# Patient Record
Sex: FEMALE | Race: WHITE | Employment: OTHER | URBAN - METROPOLITAN AREA
[De-identification: names, ages, dates, MRNs, and addresses within clinical notes are randomized per-mention and may not be internally consistent; named-entity substitution may affect disease eponyms.]

---

## 2017-05-16 ENCOUNTER — LAB ENCOUNTER (OUTPATIENT)
Dept: LAB | Age: 72
End: 2017-05-16
Attending: OBSTETRICS & GYNECOLOGY
Payer: MEDICARE

## 2017-05-16 DIAGNOSIS — C56.9 MALIGNANT NEOPLASM OF OVARY (HCC): Primary | ICD-10-CM

## 2017-05-16 PROCEDURE — 86304 IMMUNOASSAY TUMOR CA 125: CPT

## 2017-05-16 PROCEDURE — 36415 COLL VENOUS BLD VENIPUNCTURE: CPT

## 2017-10-30 ENCOUNTER — LAB ENCOUNTER (OUTPATIENT)
Dept: LAB | Age: 72
End: 2017-10-30
Attending: OBSTETRICS & GYNECOLOGY
Payer: MEDICARE

## 2017-10-30 DIAGNOSIS — C56.9 MALIGNANT NEOPLASM OF OVARY (HCC): Primary | ICD-10-CM

## 2017-10-30 PROCEDURE — 86304 IMMUNOASSAY TUMOR CA 125: CPT

## 2017-10-30 PROCEDURE — 36415 COLL VENOUS BLD VENIPUNCTURE: CPT

## 2018-05-14 ENCOUNTER — LAB ENCOUNTER (OUTPATIENT)
Dept: LAB | Age: 73
End: 2018-05-14
Attending: OBSTETRICS & GYNECOLOGY
Payer: MEDICARE

## 2018-05-14 DIAGNOSIS — C56.9 OVARIAN CANCER (HCC): Primary | ICD-10-CM

## 2018-05-14 PROCEDURE — 86304 IMMUNOASSAY TUMOR CA 125: CPT

## 2018-05-14 PROCEDURE — 36415 COLL VENOUS BLD VENIPUNCTURE: CPT

## 2018-11-05 ENCOUNTER — LAB ENCOUNTER (OUTPATIENT)
Dept: LAB | Age: 73
End: 2018-11-05
Attending: OBSTETRICS & GYNECOLOGY
Payer: MEDICARE

## 2018-11-05 DIAGNOSIS — C56.9 MALIGNANT NEOPLASM OF OVARY (HCC): Primary | ICD-10-CM

## 2018-11-05 PROCEDURE — 36415 COLL VENOUS BLD VENIPUNCTURE: CPT

## 2018-11-05 PROCEDURE — 86304 IMMUNOASSAY TUMOR CA 125: CPT

## 2019-11-06 ENCOUNTER — LAB ENCOUNTER (OUTPATIENT)
Dept: LAB | Age: 74
End: 2019-11-06
Attending: OBSTETRICS & GYNECOLOGY
Payer: MEDICARE

## 2019-11-06 DIAGNOSIS — C56.9 MALIGNANT NEOPLASM OF OVARY (HCC): Primary | ICD-10-CM

## 2019-11-06 PROCEDURE — 36415 COLL VENOUS BLD VENIPUNCTURE: CPT

## 2019-11-06 PROCEDURE — 86304 IMMUNOASSAY TUMOR CA 125: CPT

## 2020-01-01 ENCOUNTER — OFFICE VISIT (OUTPATIENT)
Dept: HEMATOLOGY/ONCOLOGY | Facility: HOSPITAL | Age: 75
End: 2020-01-01
Attending: INTERNAL MEDICINE
Payer: MEDICARE

## 2020-01-01 ENCOUNTER — HOSPITAL ENCOUNTER (OUTPATIENT)
Dept: CT IMAGING | Facility: HOSPITAL | Age: 75
Discharge: HOME | End: 2020-01-01
Attending: INTERNAL MEDICINE
Payer: MEDICARE

## 2020-01-01 VITALS
SYSTOLIC BLOOD PRESSURE: 157 MMHG | HEIGHT: 59.45 IN | TEMPERATURE: 99 F | RESPIRATION RATE: 18 BRPM | HEART RATE: 75 BPM | DIASTOLIC BLOOD PRESSURE: 81 MMHG | OXYGEN SATURATION: 98 % | WEIGHT: 166 LBS | BODY MASS INDEX: 33.02 KG/M2

## 2020-01-01 VITALS
WEIGHT: 167.5 LBS | BODY MASS INDEX: 33.32 KG/M2 | SYSTOLIC BLOOD PRESSURE: 140 MMHG | HEIGHT: 59.45 IN | DIASTOLIC BLOOD PRESSURE: 81 MMHG | RESPIRATION RATE: 16 BRPM | HEART RATE: 72 BPM | TEMPERATURE: 99 F | OXYGEN SATURATION: 98 %

## 2020-01-01 VITALS
OXYGEN SATURATION: 96 % | HEART RATE: 76 BPM | WEIGHT: 166.38 LBS | TEMPERATURE: 97 F | RESPIRATION RATE: 16 BRPM | BODY MASS INDEX: 33.1 KG/M2 | HEIGHT: 59.45 IN | SYSTOLIC BLOOD PRESSURE: 151 MMHG | DIASTOLIC BLOOD PRESSURE: 81 MMHG

## 2020-01-01 VITALS
BODY MASS INDEX: 33.02 KG/M2 | WEIGHT: 166 LBS | OXYGEN SATURATION: 97 % | TEMPERATURE: 99 F | SYSTOLIC BLOOD PRESSURE: 133 MMHG | DIASTOLIC BLOOD PRESSURE: 72 MMHG | RESPIRATION RATE: 16 BRPM | HEIGHT: 59.45 IN | HEART RATE: 76 BPM

## 2020-01-01 VITALS
HEIGHT: 59.45 IN | BODY MASS INDEX: 32.7 KG/M2 | OXYGEN SATURATION: 97 % | WEIGHT: 164.38 LBS | TEMPERATURE: 99 F | DIASTOLIC BLOOD PRESSURE: 81 MMHG | SYSTOLIC BLOOD PRESSURE: 147 MMHG | RESPIRATION RATE: 16 BRPM | HEART RATE: 73 BPM

## 2020-01-01 DIAGNOSIS — C80.1 PANCREATOBILIARY-TYPE CARCINOMA (HCC): ICD-10-CM

## 2020-01-01 DIAGNOSIS — C78.7 LIVER METASTASES (HCC): ICD-10-CM

## 2020-01-01 DIAGNOSIS — I89.0 LYMPHEDEMA OF BOTH LOWER EXTREMITIES: ICD-10-CM

## 2020-01-01 DIAGNOSIS — C54.1 ENDOMETRIAL CANCER (HCC): ICD-10-CM

## 2020-01-01 DIAGNOSIS — R10.2 PELVIC PAIN: ICD-10-CM

## 2020-01-01 DIAGNOSIS — C24.9 BILIARY TRACT CANCER (HCC): Primary | ICD-10-CM

## 2020-01-01 DIAGNOSIS — D64.81 ANEMIA ASSOCIATED WITH CHEMOTHERAPY: ICD-10-CM

## 2020-01-01 DIAGNOSIS — I82.431 ACUTE DEEP VEIN THROMBOSIS (DVT) OF RIGHT POPLITEAL VEIN (HCC): ICD-10-CM

## 2020-01-01 DIAGNOSIS — I26.99 PE (PULMONARY THROMBOEMBOLISM) (HCC): ICD-10-CM

## 2020-01-01 DIAGNOSIS — D75.89 MACROCYTOSIS: ICD-10-CM

## 2020-01-01 DIAGNOSIS — C25.8 MALIGNANT NEOPLASM OF OVERLAPPING SITES OF PANCREAS (HCC): ICD-10-CM

## 2020-01-01 DIAGNOSIS — C80.1 PANCREATOBILIARY-TYPE CARCINOMA (HCC): Primary | ICD-10-CM

## 2020-01-01 DIAGNOSIS — D64.89 ANEMIA DUE TO OTHER CAUSE, NOT CLASSIFIED: ICD-10-CM

## 2020-01-01 DIAGNOSIS — T45.1X5A ANEMIA ASSOCIATED WITH CHEMOTHERAPY: ICD-10-CM

## 2020-01-01 DIAGNOSIS — D75.89 MACROCYTOSIS: Primary | ICD-10-CM

## 2020-01-01 PROCEDURE — 86301 IMMUNOASSAY TUMOR CA 19-9: CPT

## 2020-01-01 PROCEDURE — 99215 OFFICE O/P EST HI 40 MIN: CPT | Performed by: INTERNAL MEDICINE

## 2020-01-01 PROCEDURE — 36591 DRAW BLOOD OFF VENOUS DEVICE: CPT

## 2020-01-01 PROCEDURE — 96413 CHEMO IV INFUSION 1 HR: CPT

## 2020-01-01 PROCEDURE — 82746 ASSAY OF FOLIC ACID SERUM: CPT

## 2020-01-01 PROCEDURE — 96375 TX/PRO/DX INJ NEW DRUG ADDON: CPT

## 2020-01-01 PROCEDURE — 74177 CT ABD & PELVIS W/CONTRAST: CPT | Performed by: INTERNAL MEDICINE

## 2020-01-01 PROCEDURE — 84443 ASSAY THYROID STIM HORMONE: CPT

## 2020-01-01 PROCEDURE — 85025 COMPLETE CBC W/AUTO DIFF WBC: CPT

## 2020-01-01 PROCEDURE — 85045 AUTOMATED RETICULOCYTE COUNT: CPT

## 2020-01-01 PROCEDURE — 84439 ASSAY OF FREE THYROXINE: CPT

## 2020-01-01 PROCEDURE — 71260 CT THORAX DX C+: CPT | Performed by: INTERNAL MEDICINE

## 2020-01-01 PROCEDURE — 80053 COMPREHEN METABOLIC PANEL: CPT

## 2020-01-01 PROCEDURE — 82607 VITAMIN B-12: CPT

## 2020-01-01 RX ORDER — SODIUM CHLORIDE 0.9 % (FLUSH) 0.9 %
10 SYRINGE (ML) INJECTION ONCE
Status: CANCELLED | OUTPATIENT
Start: 2020-01-01

## 2020-01-01 RX ORDER — TRAMADOL HYDROCHLORIDE 50 MG/1
50 TABLET ORAL EVERY 6 HOURS PRN
Status: ON HOLD | COMMUNITY
End: 2021-01-01

## 2020-01-01 RX ORDER — SODIUM CHLORIDE 0.9 % (FLUSH) 0.9 %
10 SYRINGE (ML) INJECTION ONCE
Status: COMPLETED | OUTPATIENT
Start: 2020-01-01 | End: 2020-01-01

## 2020-01-01 RX ADMIN — SODIUM CHLORIDE 0.9 % (FLUSH) 10 ML: 0.9 % SYRINGE (ML) INJECTION at 11:00:00

## 2020-01-01 RX ADMIN — SODIUM CHLORIDE 0.9 % (FLUSH) 10 ML: 0.9 % SYRINGE (ML) INJECTION at 11:45:00

## 2020-01-01 RX ADMIN — SODIUM CHLORIDE 0.9 % (FLUSH) 10 ML: 0.9 % SYRINGE (ML) INJECTION at 11:20:00

## 2020-06-24 ENCOUNTER — APPOINTMENT (OUTPATIENT)
Dept: CT IMAGING | Facility: HOSPITAL | Age: 75
DRG: 176 | End: 2020-06-24
Attending: EMERGENCY MEDICINE
Payer: MEDICARE

## 2020-06-24 ENCOUNTER — HOSPITAL ENCOUNTER (INPATIENT)
Facility: HOSPITAL | Age: 75
LOS: 2 days | Discharge: HOME | DRG: 176 | End: 2020-06-27
Attending: EMERGENCY MEDICINE | Admitting: HOSPITALIST
Payer: MEDICARE

## 2020-06-24 ENCOUNTER — HOSPITAL ENCOUNTER (OUTPATIENT)
Age: 75
Discharge: ANOTHER HOSPITAL | End: 2020-06-24
Payer: MEDICARE

## 2020-06-24 ENCOUNTER — APPOINTMENT (OUTPATIENT)
Dept: GENERAL RADIOLOGY | Age: 75
End: 2020-06-24
Attending: NURSE PRACTITIONER
Payer: MEDICARE

## 2020-06-24 VITALS
RESPIRATION RATE: 18 BRPM | OXYGEN SATURATION: 95 % | HEART RATE: 81 BPM | TEMPERATURE: 100 F | SYSTOLIC BLOOD PRESSURE: 142 MMHG | DIASTOLIC BLOOD PRESSURE: 55 MMHG

## 2020-06-24 DIAGNOSIS — R06.00 DOE (DYSPNEA ON EXERTION): ICD-10-CM

## 2020-06-24 DIAGNOSIS — C79.9 METASTATIC ADENOCARCINOMA (HCC): ICD-10-CM

## 2020-06-24 DIAGNOSIS — I26.99 PE (PULMONARY THROMBOEMBOLISM) (HCC): Primary | ICD-10-CM

## 2020-06-24 DIAGNOSIS — R53.83 FATIGUE, UNSPECIFIED TYPE: ICD-10-CM

## 2020-06-24 DIAGNOSIS — R79.89 POSITIVE D DIMER: Primary | ICD-10-CM

## 2020-06-24 DIAGNOSIS — R05.3 CHRONIC COUGHING: ICD-10-CM

## 2020-06-24 LAB
APTT PPP: 28.7 SECONDS (ref 25.4–36.1)
BASOPHILS # BLD AUTO: 0.06 X10(3) UL (ref 0–0.2)
BASOPHILS NFR BLD AUTO: 0.6 %
DEPRECATED RDW RBC AUTO: 48.2 FL (ref 35.1–46.3)
EOSINOPHIL # BLD AUTO: 0.06 X10(3) UL (ref 0–0.7)
EOSINOPHIL NFR BLD AUTO: 0.6 %
ERYTHROCYTE [DISTWIDTH] IN BLOOD BY AUTOMATED COUNT: 13.7 % (ref 11–15)
HCT VFR BLD AUTO: 38.3 % (ref 35–48)
HGB BLD-MCNC: 12.4 G/DL (ref 12–16)
IMM GRANULOCYTES # BLD AUTO: 0.03 X10(3) UL (ref 0–1)
IMM GRANULOCYTES NFR BLD: 0.3 %
LYMPHOCYTES # BLD AUTO: 1.7 X10(3) UL (ref 1–4)
LYMPHOCYTES NFR BLD AUTO: 16.2 %
MCH RBC QN AUTO: 31 PG (ref 26–34)
MCHC RBC AUTO-ENTMCNC: 32.4 G/DL (ref 31–37)
MCV RBC AUTO: 95.8 FL (ref 80–100)
MONOCYTES # BLD AUTO: 1.11 X10(3) UL (ref 0.1–1)
MONOCYTES NFR BLD AUTO: 10.6 %
NEUTROPHILS # BLD AUTO: 7.53 X10 (3) UL (ref 1.5–7.7)
NEUTROPHILS # BLD AUTO: 7.53 X10(3) UL (ref 1.5–7.7)
NEUTROPHILS NFR BLD AUTO: 71.7 %
NT-PROBNP SERPL-MCNC: 476 PG/ML (ref ?–125)
PLATELET # BLD AUTO: 144 10(3)UL (ref 150–450)
RBC # BLD AUTO: 4 X10(6)UL (ref 3.8–5.3)
SARS-COV-2 RNA RESP QL NAA+PROBE: NOT DETECTED
WBC # BLD AUTO: 10.5 X10(3) UL (ref 4–11)

## 2020-06-24 PROCEDURE — 85378 FIBRIN DEGRADE SEMIQUANT: CPT | Performed by: NURSE PRACTITIONER

## 2020-06-24 PROCEDURE — 84484 ASSAY OF TROPONIN QUANT: CPT

## 2020-06-24 PROCEDURE — 93010 ELECTROCARDIOGRAM REPORT: CPT | Performed by: NURSE PRACTITIONER

## 2020-06-24 PROCEDURE — 99215 OFFICE O/P EST HI 40 MIN: CPT | Performed by: NURSE PRACTITIONER

## 2020-06-24 PROCEDURE — 81002 URINALYSIS NONAUTO W/O SCOPE: CPT | Performed by: NURSE PRACTITIONER

## 2020-06-24 PROCEDURE — 93005 ELECTROCARDIOGRAM TRACING: CPT

## 2020-06-24 PROCEDURE — 99220 INITIAL OBSERVATION CARE,LEVL III: CPT | Performed by: HOSPITALIST

## 2020-06-24 PROCEDURE — 71275 CT ANGIOGRAPHY CHEST: CPT | Performed by: EMERGENCY MEDICINE

## 2020-06-24 PROCEDURE — 36415 COLL VENOUS BLD VENIPUNCTURE: CPT | Performed by: NURSE PRACTITIONER

## 2020-06-24 PROCEDURE — 80047 BASIC METABLC PNL IONIZED CA: CPT

## 2020-06-24 PROCEDURE — 71046 X-RAY EXAM CHEST 2 VIEWS: CPT | Performed by: NURSE PRACTITIONER

## 2020-06-24 RX ORDER — HEPARIN SODIUM AND DEXTROSE 10000; 5 [USP'U]/100ML; G/100ML
18 INJECTION INTRAVENOUS ONCE
Status: COMPLETED | OUTPATIENT
Start: 2020-06-24 | End: 2020-06-25

## 2020-06-24 RX ORDER — HEPARIN SODIUM 5000 [USP'U]/ML
80 INJECTION INTRAVENOUS; SUBCUTANEOUS ONCE
Status: COMPLETED | OUTPATIENT
Start: 2020-06-24 | End: 2020-06-24

## 2020-06-24 NOTE — ED PROVIDER NOTES
Patient Seen in: THE [facility] END      History   Patient presents with:  Cough/URI    Stated Complaint: cough all month,aches,fatigue    HPI  Patient is a 25-year-old female past medical history of impaired fasting glucose, ovarian ca 5/09 CT shows 3x2.7cm stone (asymptomatic)   • Dyslipidemia      diet therapy   • Dysplasia of cervix (uteri)    • Endometrial cancer ([facility] [address] 1/09     Grade 1 Stage 1B   • Impaired fasting glucose     highest 116 (7/04)   • Lymphedema of both lower extremit Current:/55   Pulse 81   Temp 99.6 °F (37.6 °C) (Temporal)   Resp 18   SpO2 95%         Physical Exam  Vitals signs and nursing note reviewed. Constitutional:       General: She is not in acute distress.      Appearance: She is not ill-appearing, to EKG; normal sinus rhythm with ventricular rate 74 bpm.  Possible left atrial enlargement. Inverted T waves in leads III, V1. No signs of ischemia. When compared to EKG from July 9, 2013 patient is no longer bradycardic.   Otherwise no significant change Follow-up:  No follow-up provider specified.         Medications Prescribed:  Discharge Medication List as of 6/24/2020  6:32 PM

## 2020-06-24 NOTE — ED INITIAL ASSESSMENT (HPI)
Patient sent from [facility] for elevated D-Dimer, per patient she has GONZALEZ, fatigue, chronic coughing since the beginning of June. Patient current has no complaints and is A&Ox4.

## 2020-06-25 ENCOUNTER — APPOINTMENT (OUTPATIENT)
Dept: CV DIAGNOSTICS | Facility: HOSPITAL | Age: 75
DRG: 176 | End: 2020-06-25
Attending: HOSPITALIST
Payer: MEDICARE

## 2020-06-25 ENCOUNTER — APPOINTMENT (OUTPATIENT)
Dept: CT IMAGING | Facility: HOSPITAL | Age: 75
DRG: 176 | End: 2020-06-25
Attending: INTERNAL MEDICINE
Payer: MEDICARE

## 2020-06-25 ENCOUNTER — APPOINTMENT (OUTPATIENT)
Dept: ULTRASOUND IMAGING | Facility: HOSPITAL | Age: 75
DRG: 176 | End: 2020-06-25
Attending: HOSPITALIST
Payer: MEDICARE

## 2020-06-25 PROBLEM — I26.99 PULMONARY EMBOLISM AND INFARCTION (HCC): Status: ACTIVE | Noted: 2020-06-25

## 2020-06-25 LAB
AFP-TM SERPL-MCNC: 2.9 NG/ML (ref ?–8)
ANION GAP SERPL CALC-SCNC: 4 MMOL/L (ref 0–18)
APTT PPP: 144.7 SECONDS (ref 25.4–36.1)
APTT PPP: 47.8 SECONDS (ref 25.4–36.1)
APTT PPP: 78.9 SECONDS (ref 25.4–36.1)
ATRIAL RATE: 74 BPM
BASOPHILS # BLD AUTO: 0.08 X10(3) UL (ref 0–0.2)
BASOPHILS NFR BLD AUTO: 0.8 %
BUN BLD-MCNC: 13 MG/DL (ref 7–18)
BUN/CREAT SERPL: 14.9 (ref 10–20)
CALCIUM BLD-MCNC: 8.4 MG/DL (ref 8.5–10.1)
CANCER AG125 SERPL-ACNC: 172 U/ML (ref ?–35)
CANCER AG19-9 SERPL-ACNC: ABNORMAL U/ML (ref ?–37)
CEA SERPL-MCNC: 8.2 NG/ML (ref ?–5)
CHLORIDE SERPL-SCNC: 106 MMOL/L (ref 98–112)
CO2 SERPL-SCNC: 28 MMOL/L (ref 21–32)
CREAT BLD-MCNC: 0.87 MG/DL (ref 0.55–1.02)
DEPRECATED RDW RBC AUTO: 47.7 FL (ref 35.1–46.3)
EOSINOPHIL # BLD AUTO: 0.08 X10(3) UL (ref 0–0.7)
EOSINOPHIL NFR BLD AUTO: 0.8 %
ERYTHROCYTE [DISTWIDTH] IN BLOOD BY AUTOMATED COUNT: 13.7 % (ref 11–15)
GLUCOSE BLD-MCNC: 113 MG/DL (ref 70–99)
HCT VFR BLD AUTO: 36 % (ref 35–48)
HGB BLD-MCNC: 11.7 G/DL (ref 12–16)
IMM GRANULOCYTES # BLD AUTO: 0.02 X10(3) UL (ref 0–1)
IMM GRANULOCYTES NFR BLD: 0.2 %
LYMPHOCYTES # BLD AUTO: 1.4 X10(3) UL (ref 1–4)
LYMPHOCYTES NFR BLD AUTO: 14.6 %
MCH RBC QN AUTO: 30.9 PG (ref 26–34)
MCHC RBC AUTO-ENTMCNC: 32.5 G/DL (ref 31–37)
MCV RBC AUTO: 95 FL (ref 80–100)
MONOCYTES # BLD AUTO: 0.98 X10(3) UL (ref 0.1–1)
MONOCYTES NFR BLD AUTO: 10.2 %
NEUTROPHILS # BLD AUTO: 7.06 X10 (3) UL (ref 1.5–7.7)
NEUTROPHILS # BLD AUTO: 7.06 X10(3) UL (ref 1.5–7.7)
NEUTROPHILS NFR BLD AUTO: 73.4 %
OSMOLALITY SERPL CALC.SUM OF ELEC: 287 MOSM/KG (ref 275–295)
P AXIS: 61 DEGREES
P-R INTERVAL: 158 MS
PLATELET # BLD AUTO: 146 10(3)UL (ref 150–450)
POTASSIUM SERPL-SCNC: 4.5 MMOL/L (ref 3.5–5.1)
Q-T INTERVAL: 366 MS
QRS DURATION: 88 MS
QTC CALCULATION (BEZET): 406 MS
R AXIS: 39 DEGREES
RBC # BLD AUTO: 3.79 X10(6)UL (ref 3.8–5.3)
SODIUM SERPL-SCNC: 138 MMOL/L (ref 136–145)
T AXIS: 30 DEGREES
TSI SER-ACNC: 3.34 MIU/ML (ref 0.36–3.74)
VENTRICULAR RATE: 74 BPM
WBC # BLD AUTO: 9.6 X10(3) UL (ref 4–11)

## 2020-06-25 PROCEDURE — 99223 1ST HOSP IP/OBS HIGH 75: CPT | Performed by: INTERNAL MEDICINE

## 2020-06-25 PROCEDURE — 74177 CT ABD & PELVIS W/CONTRAST: CPT | Performed by: INTERNAL MEDICINE

## 2020-06-25 PROCEDURE — 93306 TTE W/DOPPLER COMPLETE: CPT | Performed by: HOSPITALIST

## 2020-06-25 PROCEDURE — 99226 SUBSEQUENT OBSERVATION CARE: CPT | Performed by: HOSPITALIST

## 2020-06-25 PROCEDURE — 93970 EXTREMITY STUDY: CPT | Performed by: HOSPITALIST

## 2020-06-25 RX ORDER — HEPARIN SODIUM 5000 [USP'U]/ML
30 INJECTION, SOLUTION INTRAVENOUS; SUBCUTANEOUS ONCE
Status: COMPLETED | OUTPATIENT
Start: 2020-06-25 | End: 2020-06-25

## 2020-06-25 RX ORDER — ONDANSETRON 2 MG/ML
4 INJECTION INTRAMUSCULAR; INTRAVENOUS EVERY 6 HOURS PRN
Status: DISCONTINUED | OUTPATIENT
Start: 2020-06-25 | End: 2020-06-27

## 2020-06-25 RX ORDER — CETIRIZINE HYDROCHLORIDE 10 MG/1
10 TABLET ORAL DAILY
Status: DISCONTINUED | OUTPATIENT
Start: 2020-06-25 | End: 2020-06-27

## 2020-06-25 RX ORDER — METOCLOPRAMIDE HYDROCHLORIDE 5 MG/ML
5 INJECTION INTRAMUSCULAR; INTRAVENOUS EVERY 8 HOURS PRN
Status: DISCONTINUED | OUTPATIENT
Start: 2020-06-25 | End: 2020-06-27

## 2020-06-25 RX ORDER — ACETAMINOPHEN 325 MG/1
650 TABLET ORAL EVERY 6 HOURS PRN
Status: DISCONTINUED | OUTPATIENT
Start: 2020-06-25 | End: 2020-06-27

## 2020-06-25 RX ORDER — HEPARIN SODIUM AND DEXTROSE 10000; 5 [USP'U]/100ML; G/100ML
INJECTION INTRAVENOUS CONTINUOUS
Status: DISCONTINUED | OUTPATIENT
Start: 2020-06-25 | End: 2020-06-27

## 2020-06-25 NOTE — PLAN OF CARE
NURSING ADMISSION NOTE      Patient admitted via Cart  Oriented to room. Safety precautions initiated. Bed in low position. Call light in reach. Pt AOx4. On RA, denies SOB. NSR on tele. Pt denies chest pain.  Heparin gtt infusing per PE/DVT protoc

## 2020-06-25 NOTE — CONSULTS
[facility]  Report of Consultation    Rhiannaeta Comp Patient Status:  Observation    1945 MRN [id number]   [facility] 8NE-A Attending Hua Swenson MD   Hosp Day # 0 PCP None Pcp     Reason for Consultation:  Pulmonary embolis chemotherapy    • Pneumonia due to organism    • Saphenofemoral venous reflux 2016     Past Surgical History:   Procedure Laterality Date   •       x 2   • CATARACT Bilateral     IOL's   • CATHETER REMOVAL N/A 2013    Performed by Feliz Whyte °C), Min:98.8 °F (37.1 °C), Max:100 °F (37.8 °C)    Wt Readings from Last 3 Encounters:  06/25/20 : 171 lb 8.3 oz (77.8 kg)  06/09/20 : 172 lb (78 kg)  11/29/19 : 170 lb (77.1 kg)      Telemetry: Normal sinus.   General: Alert and oriented in no apparent di surgery and adjuvant chemotherapy. Incidental finding of liver suspicious for multiple moderate to static lesions. 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     The patient was admitted to telemetry last nigh

## 2020-06-25 NOTE — PROGRESS NOTES
06/25/20 0134 06/25/20 0137 06/25/20 0139   Vital Signs   Pulse 81 81 97   Resp 22 25 (!) 36   /77 123/66 106/64   Patient Position Lying Sitting Standing     Pt denies dizziness/lightheadedness.

## 2020-06-25 NOTE — H&P
[facility] HOSPITALIST  History and Physical     Michael Godoy Patient Status:  Emergency    1945 MRN [id number]   Location [address] Attending Anitha Valencia DO   Hosp Day # 0 PCP None Pcp     Chief Complaint: sob    Hi never smoked. She has never used smokeless tobacco. She reports current alcohol use of about 7.0 standard drinks of alcohol per week. She reports that she does not use drugs.     Family History:   Family History   Problem Relation Age of Onset   • Other (le rashes or lesions. Psychiatric: Appropriate mood and affect.       Diagnostic Data:      Labs:  Recent Labs   Lab 06/24/20  1924   WBC 10.5   HGB 12.4   MCV 95.8   .0*       Recent Labs   Lab 06/24/20  1754   GFRAA 73   GFRNAA 63       CrCl cannot

## 2020-06-25 NOTE — CONSULTS
Hematology-Oncology Consultation Note    Patient Name: Bev Rouse   YOB: 1945   Medical Record Number: [id number]   CSN: [id number]   Consulting Physician: Mica Chirinos MD  Date of Consultation: 6/25/2020     Reason for Consultation:  Elvia Siegel Elias Woodall   • Personal history of antineoplastic chemotherapy    • Pneumonia due to organism    • Saphenofemoral venous reflux 2016       Past Surgical History:  Past Surgical History:   Procedure Laterality Date   •       x 2   • CATARACT Bi Stress: Not on file    Relationships      Social connections:        Talks on phone: Not on file        Gets together: Not on file        Attends Spiritism service: Not on file        Active member of club or organization: Not on file        Attends meetin 100 mL, Intravenous, ONCE PRN  [COMPLETED] Heparin Sodium (Porcine) 5000 UNIT/ML injection 6,200 Units, 80 Units/kg, Intravenous, Once  [COMPLETED] heparin (PORCINE) [id number]/250mL infusion ED INITIAL DOSE, 18 Units/kg/hr, Intravenous, Once        Review RDW 13.7 06/25/2020 0339    RDW 13.7 06/24/2020 1924    RDW 13.3 11/29/2019 1133    RDW 13.9 09/21/2011 0000    RED CELL DISTRIBUTION WIDTH 13.9 07/09/2013 0945    Neutrophil Absolute Prelim 7.06 06/25/2020 0339    Neutrophil Absolute Prelim 7.53 06/24/ 141 09/25/2018 1004    Sodium 141 01/15/2016 1010    Sodium 137 09/21/2011 0000    SODIUM 139 07/09/2013 0945    Potassium 4.5 06/25/2020 0339    Potassium 4.10 11/29/2019 1133    Potassium 4.70 09/27/2019 0856    Potassium 4.4 09/25/2018 1004    Potassium ACR (American College of   Radiology) NRDR ([address]) which includes the Dose Index Registry. PATIENT STATED HISTORY:(As transcribed by Technologist)  Patient has high D-dimer with chronic cough and fatigue this month.       CONT stomach in the anterior aspect of the spleen. The lesion has water   density although there appear to be some soft tissue densities along the periphery. Considerations would include cystic pancreatic neoplasm.   Other etiologies such as GIST are also cons 2. Abnormal CT findings  - with worrisome liver and LUQ findings suspicious for cancer.   - Has been SEPIDEH for h/o ovarian and endometrial cancer diagnosed 11 years ago. - Will order dedicated CT of the abdomen and pelvis. Tumor markers ordered.      3.

## 2020-06-25 NOTE — ED PROVIDER NOTES
Patient Seen in: [facility] Emergency Department      History   Patient presents with:  Abnormal Result  Cough/URI    Stated Complaint: positive D dimer, GONZALEZ, Fatigue, Chronic coughing    HPI    81-year-old female presents to ED with complaint of sh Alcohol/week: 7.0 standard drinks      Types: 7 Glasses of wine per week      Comment: occ    Drug use: No             Review of Systems    Positive for stated complaint: positive D dimer, GONZALEZ, Fatigue, Chronic coughing  Other systems are as noted in HPI. is alert and oriented to person, place, and time. Mental status is at baseline.       Comments: Lifts all extremities against gravity, face symmetric, speech clear    Psychiatric:         Mood and Affect: Mood normal.             ED Course     Labs Reviewed Normal size calcified right hilar lymph nodes. BONES:  Stable moderate degenerative disc disease. CONCLUSION:  No acute disease.     Dictated by: Jone Vasquez MD on 6/24/2020 at 6:03 PM     Finalized by: Jone Vasquez MD on 6/24/2020 at 6 pleural effusion measuring 7 mm in thickness. CHEST WALL:  There is no axillary adenopathy. There is a 4 mm hypoattenuating right lobe thyroid nodule. There is also a 11 x 8 mm hypoattenuating lower pole right lobe thyroid nodule.  LIMITED ABDOMEN:  There described above. Dedicated thyroid sonogram recommended when patient is clinically able. The ordering clinician, Dr. Letty Rose was called and notified of the critical findings by phone at 2124 hours on 6/24/2020. There is appropriate read back.     Dictated throm[facility]embolism) [facility]  (primary encounter diagnosis)    Disposition:  Admit  6/24/2020 10:17 pm    Follow-up:  No follow-up provider specified.         Medications Prescribed:  Current Discharge Medication List                       Present on Admission  D

## 2020-06-25 NOTE — PROGRESS NOTES
[facility] HOSPITALIST  Progress Note     Lea Ball Patient Status:  Observation    1945 MRN [id number]   [facility] 8NE-A Attending Arjun Mejia MD   Hosp Day # 0 PCP None Pcp     Chief Complaint: PE  SOB   S:  Denies SOB, pain w hypercoagulable state  2. CT abd / pelvis    3. oncology  Following  4. Tumor markers p   5. Thyroid nodules  1. Dedicated thyroid sonogram eventually  6. HLD  7.  Thrombocytopenia - consumptve     PLAN   Echo  P   Heme consult  Cards rec conservative treat

## 2020-06-26 ENCOUNTER — APPOINTMENT (OUTPATIENT)
Dept: CT IMAGING | Facility: HOSPITAL | Age: 75
DRG: 176 | End: 2020-06-26
Attending: INTERNAL MEDICINE
Payer: MEDICARE

## 2020-06-26 LAB
AMYLASE PRT-CCNC: 237 U/L
AMYLASE SERPL-CCNC: 27 U/L (ref 25–115)
APTT PPP: 80.1 SECONDS (ref 25.4–36.1)
BASOPHIL PERITONEAL FLUID: 0 %
EOSINOPHILS PERITONEAL FLUID: 1 %
GLUCOSE PRT-MCNC: 6 MG/DL
INR BLD: 1.06 (ref 0.89–1.11)
LDH FLD L TO P-CCNC: 3460 U/L
LIPASE SERPL-CCNC: 172 U/L (ref 73–393)
LYMPHOCYTE PERITONEAL FLUID: 1 %
MONO/MAC/HISTIO PERITONEAL: 15 %
NEUTROPHILS PERITONEAL FLUID: 83 %
PROT PRT-MCNC: 1.9 G/DL
PSA SERPL DL<=0.01 NG/ML-MCNC: 14.1 SECONDS (ref 12.4–14.6)
RBC PERITONEAL FLUID: 5000 /MM3
TOTAL CELLS COUNTED: 100
WBC PERITONEAL FLUID: 2430 /MM3

## 2020-06-26 PROCEDURE — 0W9J3ZX DRAINAGE OF PELVIC CAVITY, PERCUTANEOUS APPROACH, DIAGNOSTIC: ICD-10-PCS | Performed by: ANESTHESIOLOGY

## 2020-06-26 PROCEDURE — 77012 CT SCAN FOR NEEDLE BIOPSY: CPT | Performed by: INTERNAL MEDICINE

## 2020-06-26 PROCEDURE — 10160 PNXR ASPIR ABSC HMTMA BULLA: CPT | Performed by: INTERNAL MEDICINE

## 2020-06-26 PROCEDURE — 99153 MOD SED SAME PHYS/QHP EA: CPT | Performed by: INTERNAL MEDICINE

## 2020-06-26 PROCEDURE — 0FB13ZX EXCISION OF RIGHT LOBE LIVER, PERCUTANEOUS APPROACH, DIAGNOSTIC: ICD-10-PCS | Performed by: ANESTHESIOLOGY

## 2020-06-26 PROCEDURE — 99232 SBSQ HOSP IP/OBS MODERATE 35: CPT | Performed by: INTERNAL MEDICINE

## 2020-06-26 PROCEDURE — 47000 NEEDLE BIOPSY OF LIVER PERQ: CPT | Performed by: INTERNAL MEDICINE

## 2020-06-26 PROCEDURE — 99232 SBSQ HOSP IP/OBS MODERATE 35: CPT | Performed by: HOSPITALIST

## 2020-06-26 PROCEDURE — 99152 MOD SED SAME PHYS/QHP 5/>YRS: CPT | Performed by: INTERNAL MEDICINE

## 2020-06-26 RX ORDER — SODIUM CHLORIDE 9 MG/ML
INJECTION, SOLUTION INTRAVENOUS CONTINUOUS
Status: DISCONTINUED | OUTPATIENT
Start: 2020-06-26 | End: 2020-06-26 | Stop reason: HOSPADM

## 2020-06-26 RX ORDER — DIPHENHYDRAMINE HYDROCHLORIDE 50 MG/ML
50 INJECTION INTRAMUSCULAR; INTRAVENOUS ONCE AS NEEDED
Status: ACTIVE | OUTPATIENT
Start: 2020-06-26 | End: 2020-06-26

## 2020-06-26 RX ORDER — MIDAZOLAM HYDROCHLORIDE 1 MG/ML
INJECTION INTRAMUSCULAR; INTRAVENOUS
Status: COMPLETED
Start: 2020-06-26 | End: 2020-06-26

## 2020-06-26 RX ORDER — FLUMAZENIL 0.1 MG/ML
0.2 INJECTION, SOLUTION INTRAVENOUS AS NEEDED
Status: DISCONTINUED | OUTPATIENT
Start: 2020-06-26 | End: 2020-06-26 | Stop reason: HOSPADM

## 2020-06-26 RX ORDER — MIDAZOLAM HYDROCHLORIDE 1 MG/ML
1 INJECTION INTRAMUSCULAR; INTRAVENOUS EVERY 5 MIN PRN
Status: DISCONTINUED | OUTPATIENT
Start: 2020-06-26 | End: 2020-06-26 | Stop reason: HOSPADM

## 2020-06-26 RX ORDER — NALOXONE HYDROCHLORIDE 0.4 MG/ML
80 INJECTION, SOLUTION INTRAMUSCULAR; INTRAVENOUS; SUBCUTANEOUS AS NEEDED
Status: DISCONTINUED | OUTPATIENT
Start: 2020-06-26 | End: 2020-06-26 | Stop reason: HOSPADM

## 2020-06-26 NOTE — PROCEDURES
[facility]  Pre-Procedure Note    Name: Sadaf Myers  MRN#: [id number]  : 1945    Procedure: CT Guided Liver Mass Needle Core Biopsy    Indication: Hepatic metastatic disease    Allergies:      Penicillins             RASH  Flonase [Fluticason

## 2020-06-26 NOTE — PROGRESS NOTES
Heme/Onc Progress Note    Patient Name: Mukesh Harris   YOB: 1945   Medical Record Number: [id number]   CSN: [id number]   Attending Physician: Joanne Mcguire M.D.      Subjective:  Has LUQ pain    Objective:    Vitals:   06/25/20  1227 06/25/ mL, Intravenous, ONCE PRN  [COMPLETED] iohexol (OMNIPAQUE) 350 MG/ML injection 100 mL, 100 mL, Intravenous, ONCE PRN  [COMPLETED] Heparin Sodium (Porcine) 5000 UNIT/ML injection 6,200 Units, 80 Units/kg, Intravenous, Once  [COMPLETED] heparin (PORCINE) 250 lobes of the liver. The largest identified of the left lobe measures 3.8 x 3.6 cm. The largest lesion in   the right lobe is seen along the dome measuring 3.0 x 4.7 cm.   There is evidence of dilated biliary ducts within the lateral segment of the left lo mild passive atelectasis of the left lung base. There is a small nonspecific nodule within the left lower lobe measuring 4 mm.   There are small pulmonary emboli to the segmental branches of the right lower   lobe, as seen on recent CT angiogram of the lidia The   following veins were imaged bilaterally:  Common, deep, and superficial femoral, popliteal, sapheno-femoral junction, and posterior tibial veins.      PATIENT STATED HISTORY: (As transcribed by Technologist)           FINDINGS:  Compression is demonst atheromatous plaque. No evidence for dissection or aneurysm. LUNGS:  There is airspace disease identified in the left lower lobe which could reflect developing pulmonary infarct. Pneumonia not excluded. Calcified granuloma are seen in both lower lobes. raises suspicion for right heart strain. 2. Numerous hepatic masses are highly concerning for metastatic disease.   3. Left upper quadrant mixed attenuation mass contiguous with the tail the pancreas, splenic hilum and posterior aspect of the gastric fundu Normal sinus     ----------------------------------------------------------------------------     Conclusions:     1. Left ventricle:  The cavity size was normal. Wall thickness was normal.     Systolic function was normal. The estimated ejection fraction separation was  normal.  Doppler:  Transvalvular velocity was within the normal range. There  was no evidence for stenosis. There was no significant regurgitation. Pulmonic valve:   Poorly visualized. Doppler: There was no evidence for  stenosis.    The 22 - 52   LA volume/bsa, ES, 1-p A4C              24    ml/m^2 ---------   LA ID, A-P, ES, MM                      3.0   cm     2.7 - 3.8   LA ID/bsa, A-P, ES, MM                  1.7   cm/m^2 1. 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  Also will try to aspirate LUQ cystic lesion at same time to see if this helps with her pain         3. H/o ovarian and Yes

## 2020-06-26 NOTE — PLAN OF CARE
Patient alert and oriented x3 on room air , denies any pain/ distress , assisted to brp , tolerating good, plan of care updated, heparin gtt 13ml/fr infusing , next ptt in am no acute distress noted , will monitor  Monitor closely

## 2020-06-26 NOTE — IMAGING NOTE
Received pt to CT1. Pt verified name and  as well as allergies. Pt states NPO since 0800 this am.  Pt states she does not take blood thinners, RN sts heparin gtt stopped at 1230 today. Dr. Lizzie Mustafa notified. Lab results of PT/INR and PLT reviewed.

## 2020-06-26 NOTE — PROGRESS NOTES
[facility]  Progress Note    Rand Lauren Patient Status:  Inpatient    1945 MRN [id number]   [facility] 8NE-A Attending Martinez Hinton MD   Hosp Day # 1 PCP None Pcp     Subjective:  Cough and breathing better.   LUQ abdomina Eulogio  6/26/2020  2:02 PM

## 2020-06-26 NOTE — PROCEDURES
[address] Patient Status:  Inpatient    1945 MRN [id number]   [facility] 8NE-A Attending Geneva Hernandez MD   Hosp Day # 1 PCP None Pcp         Brief Procedure Report    Pre-Operative Diagnosis: Liver mass and SRINATH

## 2020-06-26 NOTE — PROGRESS NOTES
[facility] HOSPITALIST  Progress Note     Fleeta Comp Patient Status:  Observation    1945 MRN [id number]   [facility] 8NE-A Attending Hua Swenson MD   Hosp Day # 1 PCP None Pcp     Chief Complaint: PE  SOB   S:  C/o minor upper le of cyst   tomur markers are elevated , heme following    3. Hx endometrial and ovarian cancer  1. oncology  Following  4. Thyroid nodules  1. Dedicated thyroid sonogram eventually  5. HLD  6.  Thrombocytopenia - consumptve     PLAN   Echo  No RV strain

## 2020-06-27 VITALS
RESPIRATION RATE: 18 BRPM | OXYGEN SATURATION: 97 % | WEIGHT: 171.5 LBS | TEMPERATURE: 100 F | DIASTOLIC BLOOD PRESSURE: 70 MMHG | HEIGHT: 60 IN | SYSTOLIC BLOOD PRESSURE: 123 MMHG | HEART RATE: 72 BPM | BODY MASS INDEX: 33.67 KG/M2

## 2020-06-27 LAB
APTT PPP: 58.9 SECONDS (ref 25.4–36.1)
APTT PPP: 67.1 SECONDS (ref 25.4–36.1)

## 2020-06-27 PROCEDURE — 99239 HOSP IP/OBS DSCHRG MGMT >30: CPT | Performed by: HOSPITALIST

## 2020-06-27 PROCEDURE — 99232 SBSQ HOSP IP/OBS MODERATE 35: CPT | Performed by: INTERNAL MEDICINE

## 2020-06-27 NOTE — PLAN OF CARE
patient A&OX4 , pleasant and cooperating well , family at bedside early shift , s/p liver biopsy , site intact, denies any pain/chest discomfort , frequent v/s obtained, afebrile, ambulated to brp , no acute distress noted, will continue to monitor clos Diabetes/Glucose Control  Goal: Glucose maintained within prescribed range  Description  INTERVENTIONS:  - Monitor Blood Glucose as ordered  - Assess for signs and symptoms of hyperglycemia and hypoglycemia  - Administer ordered medications to maintain glu

## 2020-06-27 NOTE — PROGRESS NOTES
Heme/Onc Progress Note    Patient Name: Bev Rouse   YOB: 1945   Medical Record Number: [id number]   CSN: [id number]       Subjective:    No new complaints.     Objective:    Vitals:   06/27/20  0400 06/27/20  0430 06/27/20  0734 06/27/20  1 Intravenous, Once  [COMPLETED] iohexol (OMNIPAQUE) 350 MG/ML injection 91 mL, 91 mL, Intravenous, ONCE PRN  [COMPLETED] iohexol (OMNIPAQUE) 350 MG/ML injection 100 mL, 100 mL, Intravenous, ONCE PRN  [COMPLETED] Heparin Sodium (Porcine) 5000 UNIT/ML injecti

## 2020-06-27 NOTE — PLAN OF CARE
Pt received at 0730 resting in bed. A&Ox4. No c/o pain. Sinus on the monitor. 96% on room air. Dressings to LUQ and RUQ c/d/I. Plan is for xarelto to be ordered pending insurance coverage, possible d/c later today. Oriented to room and call light.  Will con maintain glucose within target range  - Assess barriers to adequate nutritional intake and initiate nutrition consult as needed  - Instruct patient on self management of diabetes  Outcome: Progressing

## 2020-06-27 NOTE — PROGRESS NOTES
[facility] HOSPITALIST  Progress Note     Boatengtru Duncant Patient Status:  Observation    1945 MRN [id number]   [facility] 8NE-A Attending Geneva Hernandez MD   Hosp Day # 2 PCP None Pcp     Chief Complaint: PE  SOB   S:  No issues.      Rev endometrial and ovarian cancer- oncology  Following  4. Thyroid nodules- Dedicated thyroid sonogram eventually  5. HLD  6.  Thrombocytopenia - consumptve    DC planning on DOAC        Den Lange MD

## 2020-06-27 NOTE — CM/SW NOTE
Order received to check xarelto coverage. Call to patient's osco, patient does not have a part d plan on file, currently would be cash price >$400.  Call to patient room, states she does have part d coverage, will try and have spouse bring insurance informa

## 2020-06-28 NOTE — DISCHARGE SUMMARY
[facility] HOSPITALIST  DISCHARGE SUMMARY     Mukesh Harris Patient Status:  Inpatient    1945 MRN [id number]   [facility] 8NE-A Attending No att. providers found   Hosp Day # 2 PCP None Pcp     Date of Admission: 2020  Date of [address] suspected metastases. Patient underwent liver biopsy and left upper quadrant drainage. Biopsies and fluid results are pending upon discharge. Patient was converted to [facility] for discharge.   She will have close follow-up as outpatient with oncology [facility] [address] [address]  [phone]    Schedule an appointment as soon as possible for a visit    ----------------------------------------------------------------------------------------  PATIENT DISCHARGE INSTRUCTIONS: See electronic

## 2020-06-30 ENCOUNTER — OFFICE VISIT (OUTPATIENT)
Dept: HEMATOLOGY/ONCOLOGY | Facility: HOSPITAL | Age: 75
End: 2020-06-30
Attending: INTERNAL MEDICINE
Payer: MEDICARE

## 2020-06-30 DIAGNOSIS — C78.7 LIVER METASTASES (HCC): Primary | ICD-10-CM

## 2020-07-01 ENCOUNTER — NURSE NAVIGATOR ENCOUNTER (OUTPATIENT)
Dept: HEMATOLOGY/ONCOLOGY | Facility: HOSPITAL | Age: 75
End: 2020-07-01

## 2020-07-01 ENCOUNTER — OFFICE VISIT (OUTPATIENT)
Dept: HEMATOLOGY/ONCOLOGY | Facility: HOSPITAL | Age: 75
End: 2020-07-01
Attending: INTERNAL MEDICINE
Payer: MEDICARE

## 2020-07-01 VITALS
SYSTOLIC BLOOD PRESSURE: 124 MMHG | HEART RATE: 80 BPM | HEIGHT: 60 IN | DIASTOLIC BLOOD PRESSURE: 66 MMHG | WEIGHT: 172.19 LBS | TEMPERATURE: 99 F | RESPIRATION RATE: 16 BRPM | OXYGEN SATURATION: 97 % | BODY MASS INDEX: 33.8 KG/M2

## 2020-07-01 DIAGNOSIS — C78.7 LIVER METASTASES (HCC): ICD-10-CM

## 2020-07-01 DIAGNOSIS — I26.99 PULMONARY EMBOLISM AND INFARCTION (HCC): ICD-10-CM

## 2020-07-01 DIAGNOSIS — C24.9 BILIARY TRACT CANCER (HCC): ICD-10-CM

## 2020-07-01 DIAGNOSIS — I82.431 ACUTE DEEP VEIN THROMBOSIS (DVT) OF RIGHT POPLITEAL VEIN (HCC): ICD-10-CM

## 2020-07-01 DIAGNOSIS — I26.99 PE (PULMONARY THROMBOEMBOLISM) (HCC): ICD-10-CM

## 2020-07-01 DIAGNOSIS — I89.0 LYMPHEDEMA OF BOTH LOWER EXTREMITIES: Primary | ICD-10-CM

## 2020-07-01 DIAGNOSIS — C54.1 ENDOMETRIAL CANCER (HCC): ICD-10-CM

## 2020-07-01 DIAGNOSIS — R16.0 LIVER MASSES: ICD-10-CM

## 2020-07-01 PROCEDURE — 99215 OFFICE O/P EST HI 40 MIN: CPT | Performed by: INTERNAL MEDICINE

## 2020-07-01 NOTE — PROGRESS NOTES
Next generation genomic sequencing order sent in online to [facility] for tumor only testing. Specimen [id number] collected 06/26/2020 to be tested.

## 2020-07-01 NOTE — PATIENT INSTRUCTIONS
For triage nurse: [phone] Monday through Friday 7:30-5:00.  *Please note this is a new phone number*    After hours or weekends for emergent needs:  [phone].      To schedule diagnostic testing: Central Scheduling: [place] [id number]

## 2020-07-01 NOTE — PROGRESS NOTES
[facility] Progress Note    Patient Name: Savanna Lutz   YOB: 1945   Medical Record Number: [id number]   CSN: [id number]   Attending Physician: Fifi Macias M.D.    Referring Physician: MD Dr. Lawanda Wilson    Date of pancreatic mass lesion was seen but there was a large complex cystic appearing fluid collection effacing the pancreatic tail. Dopplers done of BLE showed a short segmental thrombus in the right popliteal vein.        Before she was switched to a DOAC biopsy Radha Plummer MD on 6/30/2020 at 1329      Final Diagnosis Comment    Immunostains performed show the tumor cells to be positive for Cytokeratin 7, Cytokerain 19, CDX2, and focally for Cytokeratin 20; supporting the above diagnosis.   TTF-1, [place]-8, WT1, [place] hepatic masses on imaging. Left upper quadrant attenuation mass contiguous with the tail of the pancreas, splenic hilum and posterior aspect of the gastric fundus.   The patient has a history of stage 2 ovarian cancer and stage 1 endometrial cancer in 2009 =1.0 ()Dr. Jose Bazan   • Personal history of antineoplastic chemotherapy    • Pneumonia due to organism    • Saphenofemoral venous reflux 2016       Past Surgical History:  Past Surgical History:   Procedure Laterality Date   •  Comment: occ      Drug use: No      Sexual activity: Yes        Partners: Male    Lifestyle      Physical activity:        Days per week: Not on file        Minutes per session: Not on file      Stress: Not on file    Relationships      Social connections: Oropharynx is clear. Neck: No JVD. No palpable lymphadenopathy. Neck is supple. Chest: Clear to auscultation. Heart: Regular rate and rhythm. Abdomen: Soft, non tender with good bowel sounds. Extremities: Pedal pulses are present. RLE edema.   Neurol non-ionic intravenous contrast material. Post contrast coronal MPR imaging was performed. Dose reduction techniques were used.  Dose information is   transmitted to the ACR ([facility]) Meryl Ricardo [address] ([address]) which inclu lesion as described above under the   pancreas heading. KIDNEYS:  No mass, obstruction, or calcification. ADRENALS:  No mass or enlargement. AORTA/VASCULAR:  No aneurysm or dissection. RETROPERITONEUM:  No mass or adenopathy.     BOWEL/MESENTERY: within the left lower lobe. Short-term follow-up imaging is recommended in 3 months for further assessment. Dictated by: Jodi Macias DO on 6/25/2020 at 2:25 PM       Finalized by:  Jodi Macias DO on 6/25/2020 at 2:42 PM     PROCEDURE:  Frederick Kamara axillary adenopathy. There is a 4 mm hypoattenuating right lobe thyroid nodule. There is also a 11 x 8 mm hypoattenuating lower pole right lobe thyroid nodule.   LIMITED ABDOMEN:  There are numerous hypo attenuating hepatic masses identified within the vi recommended when patient is clinically able. The ordering clinician, Dr. Teresa Conroy was called and notified of the critical findings by phone at 2124 hours on 6/24/2020. There is appropriate read back.           Dictated by: Cameron Villegas MD on 6/24/2 has a biliary tract or cholangiocarcinoma. Overview of treatment options were discussed which included chemotherapy, targeted therapy and IO. I recommended she undergo Guardant testing today. Will also send her specimen for NGS and MSI testing.  I explained currently there are no active problems.     Jennifer Fitzgerald MD  [facility]

## 2020-07-09 ENCOUNTER — OFFICE VISIT (OUTPATIENT)
Dept: HEMATOLOGY/ONCOLOGY | Facility: HOSPITAL | Age: 75
End: 2020-07-09
Attending: NURSE PRACTITIONER
Payer: MEDICARE

## 2020-07-09 ENCOUNTER — TELEPHONE (OUTPATIENT)
Dept: HEMATOLOGY/ONCOLOGY | Facility: HOSPITAL | Age: 75
End: 2020-07-09

## 2020-07-09 VITALS
RESPIRATION RATE: 17 BRPM | TEMPERATURE: 100 F | DIASTOLIC BLOOD PRESSURE: 82 MMHG | BODY MASS INDEX: 33.8 KG/M2 | HEIGHT: 60 IN | SYSTOLIC BLOOD PRESSURE: 124 MMHG | HEART RATE: 80 BPM | WEIGHT: 172.19 LBS | OXYGEN SATURATION: 96 %

## 2020-07-09 DIAGNOSIS — K59.03 DRUG-INDUCED CONSTIPATION: ICD-10-CM

## 2020-07-09 DIAGNOSIS — G89.3 NEOPLASM RELATED PAIN: Primary | ICD-10-CM

## 2020-07-09 PROBLEM — Z51.5 PALLIATIVE CARE BY SPECIALIST: Status: ACTIVE | Noted: 2020-07-09

## 2020-07-09 PROCEDURE — 99203 OFFICE O/P NEW LOW 30 MIN: CPT | Performed by: NURSE PRACTITIONER

## 2020-07-09 RX ORDER — TRAMADOL HYDROCHLORIDE 50 MG/1
50 TABLET ORAL EVERY 6 HOURS PRN
Qty: 30 TABLET | Refills: 0 | Status: SHIPPED | OUTPATIENT
Start: 2020-07-09 | End: 2020-09-28

## 2020-07-09 RX ORDER — ACETAMINOPHEN 500 MG
1000 TABLET ORAL EVERY 8 HOURS PRN
Qty: 60 TABLET | Refills: 0 | Status: ON HOLD | COMMUNITY
Start: 2020-07-09 | End: 2021-01-01

## 2020-07-09 NOTE — PROGRESS NOTES
Palliative Care Consult Note     Patient Name: Mack Romero   YOB: 1945   Medical Record Number: [id number]   [facility]: [id number]   Date of visit: 7/9/2020     Reason for Consult:  Acute pain control referred by Dr. Israel Garcia    Chief Complaint/Caren stone (asymptomatic)   • Dyslipidemia      diet therapy   • Dysplasia of cervix (uteri)    • [facility]     Grade 1 Stage 1B   • Impaired fasting glucose     highest 116 (7/04)   • Lymphedema of both lower extremities 2/1/2016   • Ovaria and Sexual Activity      Alcohol use:  Yes        Alcohol/week: 7.0 standard drinks        Types: 7 Glasses of wine per week        Frequency: 2-3 times a week        Drinks per session: 1 or 2        Binge frequency: Never        Comment: occ      Drug use Discussed optimizing pain control since she wants to be more active. Discussed using tylenol 1G up to 3 times daily if needed for pain. Can also try tramadol for those severe pain spikes. Can trial at night first to eval effectiveness and SE.   She will

## 2020-07-09 NOTE — TELEPHONE ENCOUNTER
Sissy Cha calling having constipation,body pain on left side of body. Denies other symptoms.  Thank you Al Segovia

## 2020-07-09 NOTE — PROGRESS NOTES
Patient presents with:  Pain: APN assessment  Consult    Pt is here for a sick call and pain consult. She reports she was awakened last night with 8/10 pain in her left abdomen and flank. One episode of vomiting; intermittent constipation.  Pain is currentl

## 2020-07-09 NOTE — TELEPHONE ENCOUNTER
Liver mass, liver mets - pending chemo vs oral therapy    Pain to left center abdomen to side usually comes and goes, more constant since last night. Rate pain 8/10. Has not taken anything for it yet.   Actually feels better when standing but can't stand al

## 2020-07-10 ENCOUNTER — TELEPHONE (OUTPATIENT)
Dept: HEMATOLOGY/ONCOLOGY | Facility: HOSPITAL | Age: 75
End: 2020-07-10

## 2020-07-10 ENCOUNTER — DIETICIAN VISIT (OUTPATIENT)
Dept: HEMATOLOGY/ONCOLOGY | Facility: HOSPITAL | Age: 75
End: 2020-07-10

## 2020-07-10 NOTE — PROGRESS NOTES
Nutrition screen complete as triggered by [employer] dx ofdiagnosed metastatic biliary tract cancer. Chart reviewed. Pt appears nutritionally stable at present. Will rescreen once tx commences. RD available on consult.

## 2020-07-13 ENCOUNTER — TELEPHONE (OUTPATIENT)
Dept: HEMATOLOGY/ONCOLOGY | Facility: HOSPITAL | Age: 75
End: 2020-07-13

## 2020-07-14 NOTE — TELEPHONE ENCOUNTER
Called her with Guardant and MSI results. No  mutation to target and MSI stable and therefore plan for chemotherapy with cis/gem. She would like to start this on Wednesday. We will arrange.

## 2020-07-15 ENCOUNTER — SOCIAL WORK SERVICES (OUTPATIENT)
Dept: HEMATOLOGY/ONCOLOGY | Facility: HOSPITAL | Age: 75
End: 2020-07-15

## 2020-07-15 ENCOUNTER — OFFICE VISIT (OUTPATIENT)
Dept: HEMATOLOGY/ONCOLOGY | Facility: HOSPITAL | Age: 75
End: 2020-07-15
Attending: INTERNAL MEDICINE
Payer: MEDICARE

## 2020-07-15 VITALS
SYSTOLIC BLOOD PRESSURE: 105 MMHG | RESPIRATION RATE: 16 BRPM | HEART RATE: 85 BPM | HEIGHT: 59.45 IN | WEIGHT: 171 LBS | OXYGEN SATURATION: 97 % | DIASTOLIC BLOOD PRESSURE: 69 MMHG | BODY MASS INDEX: 34.02 KG/M2 | TEMPERATURE: 98 F

## 2020-07-15 DIAGNOSIS — C78.7 LIVER METASTASES (HCC): ICD-10-CM

## 2020-07-15 DIAGNOSIS — Z71.9 ENCOUNTER FOR EDUCATION: ICD-10-CM

## 2020-07-15 DIAGNOSIS — C24.9 BILIARY TRACT CANCER (HCC): ICD-10-CM

## 2020-07-15 DIAGNOSIS — C24.9 BILIARY TRACT CANCER (HCC): Primary | ICD-10-CM

## 2020-07-15 DIAGNOSIS — I26.99 PE (PULMONARY THROMBOEMBOLISM) (HCC): Primary | ICD-10-CM

## 2020-07-15 PROCEDURE — 96376 TX/PRO/DX INJ SAME DRUG ADON: CPT

## 2020-07-15 PROCEDURE — 96413 CHEMO IV INFUSION 1 HR: CPT

## 2020-07-15 PROCEDURE — 99215 OFFICE O/P EST HI 40 MIN: CPT | Performed by: CLINICAL NURSE SPECIALIST

## 2020-07-15 PROCEDURE — 96367 TX/PROPH/DG ADDL SEQ IV INF: CPT

## 2020-07-15 PROCEDURE — 96366 THER/PROPH/DIAG IV INF ADDON: CPT

## 2020-07-15 PROCEDURE — 96361 HYDRATE IV INFUSION ADD-ON: CPT

## 2020-07-15 PROCEDURE — 96417 CHEMO IV INFUS EACH ADDL SEQ: CPT

## 2020-07-15 PROCEDURE — 96375 TX/PRO/DX INJ NEW DRUG ADDON: CPT

## 2020-07-15 RX ORDER — ONDANSETRON HYDROCHLORIDE 8 MG/1
8 TABLET, FILM COATED ORAL EVERY 8 HOURS PRN
Qty: 30 TABLET | Refills: 3 | Status: ON HOLD | OUTPATIENT
Start: 2020-07-15 | End: 2021-01-01

## 2020-07-15 RX ORDER — ONDANSETRON HYDROCHLORIDE 8 MG/1
8 TABLET, FILM COATED ORAL EVERY 8 HOURS PRN
Qty: 30 TABLET | Refills: 3 | Status: SHIPPED | OUTPATIENT
Start: 2020-07-15 | End: 2020-07-15

## 2020-07-15 RX ORDER — PROCHLORPERAZINE MALEATE 10 MG
10 TABLET ORAL EVERY 6 HOURS PRN
Qty: 30 TABLET | Refills: 3 | Status: ON HOLD | OUTPATIENT
Start: 2020-07-15 | End: 2021-01-01

## 2020-07-15 RX ORDER — SODIUM CHLORIDE 9 MG/ML
1000 INJECTION, SOLUTION INTRAVENOUS ONCE
Status: COMPLETED | OUTPATIENT
Start: 2020-07-15 | End: 2020-07-15

## 2020-07-15 RX ORDER — PROCHLORPERAZINE MALEATE 10 MG
10 TABLET ORAL EVERY 6 HOURS PRN
Qty: 30 TABLET | Refills: 3 | Status: SHIPPED | OUTPATIENT
Start: 2020-07-15 | End: 2020-07-15

## 2020-07-15 RX ADMIN — SODIUM CHLORIDE 1000 ML: 9 INJECTION, SOLUTION INTRAVENOUS at 14:44:00

## 2020-07-15 NOTE — PATIENT INSTRUCTIONS
ANTI-NAUSEA SCHEDULE:    1. Take ondansetron (zofran) tonight at 8pm.  2. You may take prochlorperazine (compazine) around 4pm this evening. 3. Alternate, every 4 hours, between zofran and compazine over the next 2 days.     For example: zofran at [employer]

## 2020-07-15 NOTE — PROGRESS NOTES
Chemotherapy Education    Learner:  Patient and Spouse    Barriers / Limitations:  None    Chemotherapy education goals:  · Learn the drug names  · Administration schedule  · Routes of administration  · Treatment setting    Drug names:  Cisplatin, Gemcitab Achieved    Notify MD/RN of any changes or problems:  Achieved    Comments:    Treatment Effects on Emotional Status:    Potential mood changes, depression, nervousness, difficulty sleeping:  Achieved    Importance of support system:  Achieved    Notify MD

## 2020-07-15 NOTE — PROGRESS NOTES
ADOLFO Mercedes, aware of urine output of 640mL and no further orders received. Pt here for C1D1.   Arrives Ambulating independently, accompanied by Spouse           Patient reports possible pregnancy since last therapy cycle: Not Applicable    Modificat

## 2020-07-15 NOTE — PROGRESS NOTES
met with Patient and  Brittneydarren Olga in Treatment room for introduction and role explanation. Patient and  live in town with their 2 grown Sons nearby as well, who are able to assist when needed.  Patient and  are retired, but [place]

## 2020-07-16 ENCOUNTER — TELEPHONE (OUTPATIENT)
Dept: HEMATOLOGY/ONCOLOGY | Facility: HOSPITAL | Age: 75
End: 2020-07-16

## 2020-07-16 NOTE — TELEPHONE ENCOUNTER
Toxicities: C1 D1 Gemcitabine/Cisplatin on 7/15/2020    Patient said she is feeling good. No side effects. I encouraged her to please call the office if she is not feeling well or has any questions or concerns.  She verbalized understanding and thanked me f

## 2020-07-22 ENCOUNTER — OFFICE VISIT (OUTPATIENT)
Dept: HEMATOLOGY/ONCOLOGY | Facility: HOSPITAL | Age: 75
End: 2020-07-22
Attending: INTERNAL MEDICINE
Payer: MEDICARE

## 2020-07-22 VITALS
RESPIRATION RATE: 16 BRPM | SYSTOLIC BLOOD PRESSURE: 130 MMHG | HEART RATE: 90 BPM | DIASTOLIC BLOOD PRESSURE: 75 MMHG | OXYGEN SATURATION: 97 % | BODY MASS INDEX: 34 KG/M2 | WEIGHT: 170.63 LBS

## 2020-07-22 DIAGNOSIS — Z51.11 ENCOUNTER FOR CHEMOTHERAPY MANAGEMENT: ICD-10-CM

## 2020-07-22 DIAGNOSIS — C24.9 BILIARY TRACT CANCER (HCC): Primary | ICD-10-CM

## 2020-07-22 DIAGNOSIS — G89.3 NEOPLASM RELATED PAIN: Primary | ICD-10-CM

## 2020-07-22 DIAGNOSIS — K59.03 DRUG-INDUCED CONSTIPATION: ICD-10-CM

## 2020-07-22 LAB
BASOPHILS # BLD AUTO: 0.08 X10(3) UL (ref 0–0.2)
BASOPHILS NFR BLD AUTO: 1.4 %
DEPRECATED RDW RBC AUTO: 49.5 FL (ref 35.1–46.3)
EOSINOPHIL # BLD AUTO: 0.09 X10(3) UL (ref 0–0.7)
EOSINOPHIL NFR BLD AUTO: 1.5 %
ERYTHROCYTE [DISTWIDTH] IN BLOOD BY AUTOMATED COUNT: 14.2 % (ref 11–15)
HCT VFR BLD AUTO: 37.2 % (ref 35–48)
HGB BLD-MCNC: 11.8 G/DL (ref 12–16)
IMM GRANULOCYTES # BLD AUTO: 0.03 X10(3) UL (ref 0–1)
IMM GRANULOCYTES NFR BLD: 0.5 %
LYMPHOCYTES # BLD AUTO: 1.02 X10(3) UL (ref 1–4)
LYMPHOCYTES NFR BLD AUTO: 17.3 %
MCH RBC QN AUTO: 30 PG (ref 26–34)
MCHC RBC AUTO-ENTMCNC: 31.7 G/DL (ref 31–37)
MCV RBC AUTO: 94.7 FL (ref 80–100)
MONOCYTES # BLD AUTO: 0.67 X10(3) UL (ref 0.1–1)
MONOCYTES NFR BLD AUTO: 11.4 %
NEUTROPHILS # BLD AUTO: 3.99 X10 (3) UL (ref 1.5–7.7)
NEUTROPHILS # BLD AUTO: 3.99 X10(3) UL (ref 1.5–7.7)
NEUTROPHILS NFR BLD AUTO: 67.9 %
PLATELET # BLD AUTO: 179 10(3)UL (ref 150–450)
RBC # BLD AUTO: 3.93 X10(6)UL (ref 3.8–5.3)
WBC # BLD AUTO: 5.9 X10(3) UL (ref 4–11)

## 2020-07-22 PROCEDURE — 96413 CHEMO IV INFUSION 1 HR: CPT

## 2020-07-22 PROCEDURE — 99213 OFFICE O/P EST LOW 20 MIN: CPT | Performed by: NURSE PRACTITIONER

## 2020-07-22 PROCEDURE — 99214 OFFICE O/P EST MOD 30 MIN: CPT | Performed by: CLINICAL NURSE SPECIALIST

## 2020-07-22 PROCEDURE — 96375 TX/PRO/DX INJ NEW DRUG ADDON: CPT

## 2020-07-22 NOTE — PROGRESS NOTES
[facility] Progress Note    Patient Name: Abbey Stein   YOB: 1945   Medical Record Number: [id number]   CSN: [id number]   Date of visit: 7/22/2020  Provider: ENA Curtis  Referring Physician: No ref.  provider found    Problem recommended especially in the setting of possible malignancy. Dedicated CT of the abdomen and pelvis confirmed presence of numerous intrahepatic lesions.  No pancreatic mass lesion was seen but there was a large complex cystic appearing fluid collection eff core biopsy of hepatic mass:   -POSITIVE for metastatic adenocarcinoma, consistent with a pancreato-biliary primary. -(See comment).    Electronically signed by Naye Vides MD on 6/30/2020 at 1329       Final Diagnosis Comment     Immunostains perfo 90 tablet, Rfl: 3  •  Meclizine HCl 25 MG Oral Tab, Take 1 tablet (25 mg total) by mouth 3 (three) times daily as needed. , Disp: 20 tablet, Rfl: 0  •  Fexofenadine HCl (ALLEGRA) 180 MG Oral Tab, Take 180 mg by mouth daily.   , Disp: , Rfl:   •  CALCIUM + D antiemetics. Emotional Well Being:  I have assessed the patient's emotional well-being and any concerns about anxiety or depression. No acute psychosocial intervention required at this time.         Riana Mercado, [address]

## 2020-07-22 NOTE — PROGRESS NOTES
Nutrition Consultation    Patient Name: Danielle Martínez  YOB: 1945  Medical Record Number: [id number]   Account Number: [de-identified]  Dietitian: Melva Jaramillo RD, GUILLE    Date of visit: 7/22/2020    Diet Rx: high protein/calorie as tolerated needed. , Disp: 20 tablet, Rfl: 0  •  Fexofenadine HCl (ALLEGRA) 180 MG Oral Tab, Take 180 mg by mouth daily.   , Disp: , Rfl:   •  CALCIUM + D OR, 1 tablet daily, Disp: , Rfl:   •  MULTIVITAMINS OR TABS, 1 TABLET DAILY, Disp: , Rfl:     Pertinent Labs: note

## 2020-07-22 NOTE — PROGRESS NOTES
Patient is here for APN assessment for C1D8 of Gemzar. Patient reports she tolerated first chemo well. No nausea or vomiting. Took antiemetics for 2 days then stopped. Patient reports a weird sensation in her head but no pain. Denies visual changes.  She is

## 2020-07-22 NOTE — PROGRESS NOTES
Pt here for C1D8.   Arrives Ambulating independently, accompanied by Self           Patient reports possible pregnancy since last therapy cycle: Not Applicable    Modifications in dose or schedule: No     Frequency of blood return and site check throughout

## 2020-07-23 PROBLEM — Z51.5 PALLIATIVE CARE BY SPECIALIST: Status: ACTIVE | Noted: 2020-07-23

## 2020-07-23 PROBLEM — Z51.5 PALLIATIVE CARE BY SPECIALIST: Status: RESOLVED | Noted: 2020-07-23 | Resolved: 2020-07-23

## 2020-07-23 NOTE — PROGRESS NOTES
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      Medications:  Current Outpatient Medications   Medication Sig Dispense Refill   • Ondansetron HCl (ZOFRAN) 8 MG tablet Take 1 tabl for pain. She isn't using tramadol but can add for severe pain. She will continue miralax. Impression/Plan:   1. Neoplasm related pain  Acetaminophen 1G Q 8 prn  Tramadol 50mg Q 8 prn    2.  Drug-induced constipation  miralax daily      Planned [facility]

## 2020-08-05 ENCOUNTER — OFFICE VISIT (OUTPATIENT)
Dept: HEMATOLOGY/ONCOLOGY | Facility: HOSPITAL | Age: 75
End: 2020-08-05
Attending: INTERNAL MEDICINE
Payer: MEDICARE

## 2020-08-05 VITALS
DIASTOLIC BLOOD PRESSURE: 82 MMHG | SYSTOLIC BLOOD PRESSURE: 135 MMHG | BODY MASS INDEX: 34.34 KG/M2 | RESPIRATION RATE: 18 BRPM | TEMPERATURE: 98 F | HEIGHT: 59.45 IN | WEIGHT: 172.63 LBS | OXYGEN SATURATION: 99 % | HEART RATE: 71 BPM

## 2020-08-05 DIAGNOSIS — I26.99 PE (PULMONARY THROMBOEMBOLISM) (HCC): ICD-10-CM

## 2020-08-05 DIAGNOSIS — C78.7 LIVER METASTASES (HCC): ICD-10-CM

## 2020-08-05 DIAGNOSIS — C24.9 BILIARY TRACT CANCER (HCC): Primary | ICD-10-CM

## 2020-08-05 DIAGNOSIS — T45.1X5A ANEMIA DUE TO ANTINEOPLASTIC CHEMOTHERAPY: ICD-10-CM

## 2020-08-05 DIAGNOSIS — I82.431 ACUTE DEEP VEIN THROMBOSIS (DVT) OF RIGHT POPLITEAL VEIN (HCC): ICD-10-CM

## 2020-08-05 DIAGNOSIS — D75.839 THROMBOCYTOSIS: ICD-10-CM

## 2020-08-05 DIAGNOSIS — C54.1 ENDOMETRIAL CANCER (HCC): ICD-10-CM

## 2020-08-05 DIAGNOSIS — G89.3 NEOPLASM RELATED PAIN: ICD-10-CM

## 2020-08-05 DIAGNOSIS — D64.81 ANEMIA DUE TO ANTINEOPLASTIC CHEMOTHERAPY: ICD-10-CM

## 2020-08-05 DIAGNOSIS — I89.0 LYMPHEDEMA OF BOTH LOWER EXTREMITIES: ICD-10-CM

## 2020-08-05 LAB
ALBUMIN SERPL-MCNC: 3.2 G/DL (ref 3.4–5)
ALBUMIN/GLOB SERPL: 0.8 {RATIO} (ref 1–2)
ALP LIVER SERPL-CCNC: 199 U/L (ref 55–142)
ALT SERPL-CCNC: 29 U/L (ref 13–56)
ANION GAP SERPL CALC-SCNC: 4 MMOL/L (ref 0–18)
AST SERPL-CCNC: 20 U/L (ref 15–37)
BASOPHILS # BLD AUTO: 0.08 X10(3) UL (ref 0–0.2)
BASOPHILS NFR BLD AUTO: 1.9 %
BILIRUB SERPL-MCNC: 0.3 MG/DL (ref 0.1–2)
BUN BLD-MCNC: 14 MG/DL (ref 7–18)
BUN/CREAT SERPL: 13.9 (ref 10–20)
CALCIUM BLD-MCNC: 8.8 MG/DL (ref 8.5–10.1)
CANCER AG125 SERPL-ACNC: 184.2 U/ML (ref ?–35)
CANCER AG19-9 SERPL-ACNC: ABNORMAL U/ML (ref ?–37)
CHLORIDE SERPL-SCNC: 107 MMOL/L (ref 98–112)
CO2 SERPL-SCNC: 29 MMOL/L (ref 21–32)
CREAT BLD-MCNC: 1.01 MG/DL (ref 0.55–1.02)
DEPRECATED RDW RBC AUTO: 57.4 FL (ref 35.1–46.3)
EOSINOPHIL # BLD AUTO: 0.24 X10(3) UL (ref 0–0.7)
EOSINOPHIL NFR BLD AUTO: 5.8 %
ERYTHROCYTE [DISTWIDTH] IN BLOOD BY AUTOMATED COUNT: 16.7 % (ref 11–15)
GLOBULIN PLAS-MCNC: 3.9 G/DL (ref 2.8–4.4)
GLUCOSE BLD-MCNC: 78 MG/DL (ref 70–99)
HCT VFR BLD AUTO: 35.3 % (ref 35–48)
HGB BLD-MCNC: 11 G/DL (ref 12–16)
IMM GRANULOCYTES # BLD AUTO: 0.01 X10(3) UL (ref 0–1)
IMM GRANULOCYTES NFR BLD: 0.2 %
LYMPHOCYTES # BLD AUTO: 0.92 X10(3) UL (ref 1–4)
LYMPHOCYTES NFR BLD AUTO: 22.1 %
M PROTEIN MFR SERPL ELPH: 7.1 G/DL (ref 6.4–8.2)
MCH RBC QN AUTO: 30.7 PG (ref 26–34)
MCHC RBC AUTO-ENTMCNC: 31.2 G/DL (ref 31–37)
MCV RBC AUTO: 98.6 FL (ref 80–100)
MONOCYTES # BLD AUTO: 0.77 X10(3) UL (ref 0.1–1)
MONOCYTES NFR BLD AUTO: 18.5 %
NEUTROPHILS # BLD AUTO: 2.15 X10 (3) UL (ref 1.5–7.7)
NEUTROPHILS # BLD AUTO: 2.15 X10(3) UL (ref 1.5–7.7)
NEUTROPHILS NFR BLD AUTO: 51.5 %
OSMOLALITY SERPL CALC.SUM OF ELEC: 289 MOSM/KG (ref 275–295)
PATIENT FASTING Y/N/NP: NO
PLATELET # BLD AUTO: 730 10(3)UL (ref 150–450)
POTASSIUM SERPL-SCNC: 4.4 MMOL/L (ref 3.5–5.1)
RBC # BLD AUTO: 3.58 X10(6)UL (ref 3.8–5.3)
SODIUM SERPL-SCNC: 140 MMOL/L (ref 136–145)
WBC # BLD AUTO: 4.2 X10(3) UL (ref 4–11)

## 2020-08-05 PROCEDURE — 99215 OFFICE O/P EST HI 40 MIN: CPT | Performed by: INTERNAL MEDICINE

## 2020-08-05 PROCEDURE — 96375 TX/PRO/DX INJ NEW DRUG ADDON: CPT

## 2020-08-05 PROCEDURE — 85025 COMPLETE CBC W/AUTO DIFF WBC: CPT

## 2020-08-05 PROCEDURE — 80053 COMPREHEN METABOLIC PANEL: CPT

## 2020-08-05 PROCEDURE — 36415 COLL VENOUS BLD VENIPUNCTURE: CPT

## 2020-08-05 PROCEDURE — 96366 THER/PROPH/DIAG IV INF ADDON: CPT

## 2020-08-05 PROCEDURE — 96376 TX/PRO/DX INJ SAME DRUG ADON: CPT

## 2020-08-05 PROCEDURE — 96413 CHEMO IV INFUSION 1 HR: CPT

## 2020-08-05 PROCEDURE — 86304 IMMUNOASSAY TUMOR CA 125: CPT

## 2020-08-05 PROCEDURE — 86301 IMMUNOASSAY TUMOR CA 19-9: CPT

## 2020-08-05 PROCEDURE — 96367 TX/PROPH/DG ADDL SEQ IV INF: CPT

## 2020-08-05 PROCEDURE — 96417 CHEMO IV INFUS EACH ADDL SEQ: CPT

## 2020-08-05 RX ORDER — SODIUM CHLORIDE 9 MG/ML
1000 INJECTION, SOLUTION INTRAVENOUS ONCE
Status: COMPLETED | OUTPATIENT
Start: 2020-08-05 | End: 2020-08-05

## 2020-08-05 RX ORDER — SODIUM CHLORIDE 9 MG/ML
1000 INJECTION, SOLUTION INTRAVENOUS ONCE
Status: CANCELLED | OUTPATIENT
Start: 2020-08-05

## 2020-08-05 RX ADMIN — SODIUM CHLORIDE 1000 ML: 9 INJECTION, SOLUTION INTRAVENOUS at 13:57:00

## 2020-08-05 NOTE — PROGRESS NOTES
Pt here for C2D2.   Arrives Ambulating independently, accompanied by Self           Patient reports possible pregnancy since last therapy cycle: Not Applicable    Modifications in dose or schedule: No     Frequency of blood return and site check throughout

## 2020-08-05 NOTE — PROGRESS NOTES
Your Child's Health  [age]-Year-Old Visit      Mitul Zee  October 29, 2018    There were no vitals taken for this visit.  Weight:       NUTRITION:  Encourage Mitul to eat fruits and vegetables.  These can be used as snacks as well as a part of meals.  They provide minerals, vitamins, and fiber.  Eating fruits and vegetables is associated with lower rates of heart disease and cancer as your child grows older.  Be a good role model and eat healthy foods yourself.  A multivitamin with 600 IU vitamin D should be given to all children who drink less than 32 oz of milk per day.    Obesity and being overweight are serious problems in the [place].  You can help your child avoid these problems by following these guidelines:  1.  Limit TV and video total time to 2 hours per day or less.  2.  Don't encourage your children to eat if they tell you they are full.  Healthy children will not starve themselves.  3.  Don't reward your children for cleaning their plates.  If they always leave food, reduce the size of the portions and tell them to ask for more if they are still hungry.  4.  Don't allow children to dish out their own portions.  They will imitate adults or imitate what they have seen on TV; in both cases, the amount will be too large.  This results in increased calories and waste.  At least for children above 5 years of age and for adults, people eat more when given larger portions.    DEVELOPMENT/BEHAVIOR:  Four-year-old children can generally jump and hop, and they have much better balance than three-year-olds.  Most can dress and undress themselves and brush their own teeth.  They begin to imitate adults and are expert at embarrassing their parents.  They are much more likely to misbehave at home than in public, and they are likely to prefer their four-year-old friends to family at times.  They have a huge fraction of their adult vocabulary and are experimenting with words that carry emotions.  Mitul  [facility] Progress Note    Patient Name: Sharonda Pickett   YOB: 1945   Medical Record Number: [id number]   CSN: [id number]   Attending Physician: Joao Amaya M.D.    Referring Physician: MD Dr. Chip Veras    Date of Actionable Variant Allele Fraction  p.G12V Missense variant (exon 2) - GOF 33.3%  p.C242fs Frameshift - LOF 28.9%  p. E545D Missense variant (exon 9) - GOF 20.9%  Copy number loss  Somatic - Biologically Relevant  Copy number loss  Copy number loss  She pre will like to see your reaction when he talks about body parts, body functions, and death.  He will ask a lot of questions but seem bored by long answers.    ACCIDENT PREVENTION (accidents account for 40 percent of deaths at this age):  1.  Car Safety:  When Mitul outgrows the car seat (and weighs at least 40 pounds), you should use an approved booster seat.  You may use your car's lap/shoulder belt, but don't let the shoulder belt run across the neck.  The seat belt should be across the hips and not across the stomach.  Mitul should ride in the back seat.  The center seat is the safest if it has a shoulder harness.  Many local police departments, hospitals, and fire departments have a program to check the installation of your car seat or booster seat.  Occasionally, car dealers will do the same.  These inspections are by appointment.  WHEN YOU CALL THE POLICE OR FIRE DEPARTMENT FOR AN APPOINTMENT, DO NOT USE THE EMERGENCY NUMBER.  2.  Water Safety:  Consider swimming lessons and emphasize water safety.  Remind Mitul never to swim without adult supervision and not to dive head-first into a pool.  3.  Strangers:  Begin to teach Mitul about strangers.  This is hard for children to understand and will require much repetition and many examples.  4.  Sharp Objects:  Keep sharp knives, scissors, and tools away from children at this age.  They cannot be used even with supervision because the children are too fast.  5.  Burns/Fire:  Teach Mitul about hot and cold.  Remind him about matches, hot grills, and hot mufflers on yard equipment.  Have a family fire safety plan, including regular smoke detector battery checks, working fire extinguishers, and two preplanned escape routes.  6.  Lawn Mowing:  Keep Mitul far away when you are mowing the lawn.  There have been many serious injuries from thrown rocks.  7.  Bike Helmets:  Mitul should wear a helmet when riding on bicycles or tricycles or when roller skating.  8.  ID Bracelets:  If  Jack attends school or , consider purchasing an ID bracelet with his initials (not name), address, and phone number listed.    SUN EXPOSURE:  If you plan to have Jack outside for more than 30 minutes, please follow the guidelines in our Sun Exposure handout.    SMOKING:  Children exposed to tobacco smoke have more ear infections and pneumonia.  Cold symptoms last longer.  If you smoke, please quit.  If you cannot quit, smoke outside.  Do not smoke near Jack, and do not let others smoke near him.    TUBERCULOSIS:  There is such a low rate of tuberculosis in our area that frequent skin tests are no longer recommended.  However, certain situations do increase Jack's risk, including contact with AIDS patients, nursing home residents, prisoners, immigrants, or homeless persons.  Please let us know if Jack has contacts with such persons, or if he has contact with anyone known to have or suspected of having tuberculosis.    TELEVISION/VIDEO:  1.  Limit viewing to a maximum of two hours per day.  2.  Excessive TV is associated with obesity, aggressive behavior, and negative moods.  3.  Do not allow TV shows or videos that promote bad attitudes.  Many videos consistently portray women or minorities as victims.  4.  Children cannot distinguish commercials from programs until 6-8 years of age.  When the  says, \"Kids, tell your mom to buy...,\" children often think this is an order from an adult.  5.  Teach Jack not to eat while watching TV by not doing it yourself.  6.  Encourage other forms of learning and entertainment, such as books, story-telling, and trips to museums, farms, zoos, etc.    MEDICATION FOR FEVER OR PAIN:   Acetaminophen liquid (e.g., Tylenol or Tempra) may be given every four hours as needed for pain or fever.  Acetaminophen liquid is less concentrated than the infant dropper bottle type.  Be sure to check which product CONCENTRATION you are using.    OLD Concentration INFANT  total) by mouth 3 (three) times daily as needed. , Disp: 20 tablet, Rfl: 0  •  Fexofenadine HCl (ALLEGRA) 180 MG Oral Tab, Take 180 mg by mouth daily.   , Disp: , Rfl:   •  CALCIUM + D OR, 1 tablet daily, Disp: , Rfl:   •  MULTIVITAMINS OR TABS, 1 TABLET RONNI History:  Social History    Socioeconomic History      Marital status:       Spouse name: Not on file      Number of children: 2      Years of education: Not on file      Highest education level: Not on file    Occupational History      Occupation: Tylenol/Acetaminophen  Drops (80 MG/0.8 mL)    Child’s Weight: Dose:  24 - 35 pounds:   160 mg (2 droppers)  36 - 47 pounds:   240 mg (3 droppers)    NEW Concentration INFANT Tylenol/Acetaminophen  Drops (160 MG/5 mL)    Child’s Weight: Dose:  24 - 35 pounds:   160 mg (5.0 mL (1 Teaspoon))  36 - 47 pounds:   240 mg (7.5 mL (1 1/2 Teaspoons))    CHILDREN’S Tylenol/Acetaminophen  (160 MG/5 mL)    Child’s Weight: Dose:  24 - 35 pounds:   160 mg (5.0 mL (1 Teaspoon))  36 - 47 pounds:   240 mg (7.5 mL (1 1/2 Teaspoons))    CHILDREN’S Tylenol/Acetaminophen MELTAWAYS ( 80 MG tablets)    Child’s Weight:  Dose:  24 - 35 pounds:    160 mg (2 meltaway tablets)  36 - 47 pounds:    240 mg (3 meltaway tablets)    CHILDREN'S Ibuprofen liquid (e.g., Advil or Motrin) may be given every six hours as needed for pain or fever.    Child’s Weight:  Dose:  24 - 35 pounds:    100 mg (1 Teaspoon)  36 - 47 pounds:    150 mg (1 1/2 Teaspoons)    If Jack is outside these weight ranges, call your pediatrician's office for advice.    Most Recent Immunizations   Administered Date(s) Administered   • DTaP 09/22/2016   • DTaP/Hep B/IPV 01/04/2016   • HIB, Unspecified Formulation 09/22/2016   • Hep A, ped/adol, 2 dose 10/16/2018   • Hep B, adolescent or pediatric 2015   • Influenza, injectable, quadrivalent, preservative-free 10/16/2018   • MMR 06/20/2016   • Pneumococcal Conjugate 13 valent 06/20/2016   • Rotavirus - pentavalent 01/04/2016   • Varicella 06/20/2016       If Jack develops any of the following reactions within 72 hours after an immunization, notify your pediatrician by calling the pediatric phone nurse:  1.  A temperature of 105 degrees or above.  2.  More than 3 hours of continuous crying.  3.  A shrill, high-pitched cry.  4.  A pale, limp spell.  5.  A seizure or fainting spell.  In this case, you should call 911 or go immediately to the emergency room.      NEXT VISIT:  6 YEARS OF AGE    Thank you for entrusting your care to  (138)      Colon: declined 11/06, 11/07, 12/08, 10/09, 10/10      H/O: 10/06 (not returned)            calcium: takes MVI      exercise: walks/ golfs                          Allergies:    Penicillins             RASH  Flonase [Fluticason*        Comment:S [facility].   Non- 55 (L) >=60    GFR, -American 63 >=60    AST 20 15 - 37 U/L    ALT 29 13 - 56 U/L    Alkaline Phosphatase 199 (H) 55 - 142 U/L    Bilirubin, Total 0.3 0.1 - 2.0 mg/dL    Total Protein 7.1 6.4 - 8.2 g/dL    Albumin 3.2 (L) 3.4 - hepatic lesions noted on recent CT angiogram of the chest.     TECHNIQUE:  CT scanning was performed from the dome of the diaphragm to the pubic symphysis with non-ionic intravenous contrast material. Post contrast coronal MPR imaging was performed.   Dose lesion with peripheral enhancement that extends from the region of the pancreatic tail to the lateral aspect of the spleen, concerning for a large metastatic lesion as described above under the   pancreas heading.   KIDNEYS:  No mass, obstruction, or calcif emboli to the right lower lobe again noted. 5. Mild left pleural effusion and passive atelectasis of the left lung base. Small, 4 mm pulmonary nodule suspected within the left lower lobe.   Short-term follow-up imaging is recommended in 3 months for homero suspicion for possible heart strain. Correlate clinically. PLEURA:  There is a small left pleural effusion measuring 7 mm in thickness. CHEST WALL:  There is no axillary adenopathy. There is a 4 mm hypoattenuating right lobe thyroid nodule.   There is a attenuation within the lateral aspect of the spleen could reflect a splenic infarct. 5. Thyroid nodules as described above. Dedicated thyroid sonogram recommended when patient is clinically able.      The ordering clinician, Dr. Rebecca Jeong was called and noti presented with have resolved. She will continue    3. Right leg swelling- from DVT but also has a h/o lymphedema of this leg previously. Back to wearing compression stockings. Swelling has come down. 4. Abdominal pain- improved since chemo    5.  Throm

## 2020-08-07 ENCOUNTER — LAB ENCOUNTER (OUTPATIENT)
Dept: LAB | Facility: HOSPITAL | Age: 75
End: 2020-08-07
Attending: INTERNAL MEDICINE
Payer: MEDICARE

## 2020-08-07 DIAGNOSIS — C24.9 BILIARY TRACT CANCER (HCC): ICD-10-CM

## 2020-08-08 LAB — SARS-COV-2 RNA RESP QL NAA+PROBE: NOT DETECTED

## 2020-08-10 ENCOUNTER — HOSPITAL ENCOUNTER (OUTPATIENT)
Dept: INTERVENTIONAL RADIOLOGY/VASCULAR | Facility: HOSPITAL | Age: 75
Discharge: HOME | End: 2020-08-10
Attending: INTERNAL MEDICINE | Admitting: INTERNAL MEDICINE
Payer: MEDICARE

## 2020-08-10 VITALS
OXYGEN SATURATION: 94 % | DIASTOLIC BLOOD PRESSURE: 72 MMHG | RESPIRATION RATE: 11 BRPM | HEART RATE: 66 BPM | SYSTOLIC BLOOD PRESSURE: 137 MMHG

## 2020-08-10 DIAGNOSIS — C24.9 BILIARY TRACT CANCER (HCC): Primary | ICD-10-CM

## 2020-08-10 PROCEDURE — 77001 FLUOROGUIDE FOR VEIN DEVICE: CPT

## 2020-08-10 PROCEDURE — 02HV33Z INSERTION OF INFUSION DEVICE INTO SUPERIOR VENA CAVA, PERCUTANEOUS APPROACH: ICD-10-PCS | Performed by: RADIOLOGY

## 2020-08-10 PROCEDURE — 36561 INSERT TUNNELED CV CATH: CPT

## 2020-08-10 PROCEDURE — 99152 MOD SED SAME PHYS/QHP 5/>YRS: CPT

## 2020-08-10 PROCEDURE — 99153 MOD SED SAME PHYS/QHP EA: CPT

## 2020-08-10 PROCEDURE — 76937 US GUIDE VASCULAR ACCESS: CPT

## 2020-08-10 PROCEDURE — B5181ZA FLUOROSCOPY OF SUPERIOR VENA CAVA USING LOW OSMOLAR CONTRAST, GUIDANCE: ICD-10-PCS | Performed by: RADIOLOGY

## 2020-08-10 RX ORDER — LIDOCAINE HYDROCHLORIDE 10 MG/ML
INJECTION, SOLUTION INFILTRATION; PERINEURAL
Status: COMPLETED
Start: 2020-08-10 | End: 2020-08-10

## 2020-08-10 RX ORDER — HEPARIN SODIUM 5000 [USP'U]/ML
INJECTION, SOLUTION INTRAVENOUS; SUBCUTANEOUS
Status: COMPLETED
Start: 2020-08-10 | End: 2020-08-10

## 2020-08-10 RX ORDER — MIDAZOLAM HYDROCHLORIDE 1 MG/ML
INJECTION INTRAMUSCULAR; INTRAVENOUS
Status: COMPLETED
Start: 2020-08-10 | End: 2020-08-10

## 2020-08-10 NOTE — PROGRESS NOTES
S/P port implant, casandra well, site is covered with Mepilex and CDI, all discharge instructions given to pt and . Plan is for Chemo on Wed and will get site assessed by the cancer center a that time.   To [place] by [place],  is driving

## 2020-08-10 NOTE — H&P
[address] Patient Status:  Outpatient in a Bed    1945 MRN [id number]   Location [facility] [address] [facility] Attending García Cortez MD   Hosp Day # 0 PCP Myles Kendrick MD     Admitting Diag History:  Family History   Problem Relation Age of Onset   • Other (leukemia) Father          age [de-identified]   • Breast Cancer Cousin 54        dx age 54   • Cancer Paternal Grandmother         cancer   • Cancer Paternal Uncle         cancer prostate   • Margarito

## 2020-08-10 NOTE — PROCEDURES
[facility]  Procedure Note    Bryson Osorio Patient Status:  Outpatient in a Bed    1945 MRN [id number]   [facility] Att[facility] Chucho Lombardo MD   Hosp Day # 0 PCP Fanta Gunter MD     Procedure: Chest po

## 2020-08-12 ENCOUNTER — OFFICE VISIT (OUTPATIENT)
Dept: HEMATOLOGY/ONCOLOGY | Facility: HOSPITAL | Age: 75
End: 2020-08-12
Attending: INTERNAL MEDICINE
Payer: MEDICARE

## 2020-08-12 VITALS
BODY MASS INDEX: 33.58 KG/M2 | HEART RATE: 74 BPM | OXYGEN SATURATION: 97 % | TEMPERATURE: 97 F | DIASTOLIC BLOOD PRESSURE: 79 MMHG | WEIGHT: 168.81 LBS | SYSTOLIC BLOOD PRESSURE: 149 MMHG | RESPIRATION RATE: 16 BRPM | HEIGHT: 59.45 IN

## 2020-08-12 DIAGNOSIS — C24.9 BILIARY TRACT CANCER (HCC): Primary | ICD-10-CM

## 2020-08-12 PROCEDURE — 96413 CHEMO IV INFUSION 1 HR: CPT

## 2020-08-12 PROCEDURE — 96375 TX/PRO/DX INJ NEW DRUG ADDON: CPT

## 2020-08-12 RX ORDER — SODIUM CHLORIDE 0.9 % (FLUSH) 0.9 %
10 SYRINGE (ML) INJECTION ONCE
Status: CANCELLED | OUTPATIENT
Start: 2020-08-12

## 2020-08-12 RX ORDER — SODIUM CHLORIDE 0.9 % (FLUSH) 0.9 %
10 SYRINGE (ML) INJECTION ONCE
Status: COMPLETED | OUTPATIENT
Start: 2020-08-12 | End: 2020-08-12

## 2020-08-12 RX ADMIN — SODIUM CHLORIDE 0.9 % (FLUSH) 10 ML: 0.9 % SYRINGE (ML) INJECTION at 10:40:00

## 2020-08-12 NOTE — PROGRESS NOTES
Pt here for C2D8.   Arrives Ambulating independently, accompanied by Self           Patient reports possible pregnancy since last therapy cycle: Not Applicable    Modifications in dose or schedule: No     Frequency of blood return and site check throughout

## 2020-08-17 ENCOUNTER — HOSPITAL ENCOUNTER (OUTPATIENT)
Dept: CT IMAGING | Facility: HOSPITAL | Age: 75
Discharge: HOME | End: 2020-08-17
Attending: INTERNAL MEDICINE
Payer: MEDICARE

## 2020-08-17 DIAGNOSIS — C24.9 BILIARY TRACT CANCER (HCC): ICD-10-CM

## 2020-08-17 PROCEDURE — 74177 CT ABD & PELVIS W/CONTRAST: CPT | Performed by: INTERNAL MEDICINE

## 2020-08-17 PROCEDURE — 71260 CT THORAX DX C+: CPT | Performed by: INTERNAL MEDICINE

## 2020-08-26 ENCOUNTER — OFFICE VISIT (OUTPATIENT)
Dept: HEMATOLOGY/ONCOLOGY | Facility: HOSPITAL | Age: 75
End: 2020-08-26
Attending: INTERNAL MEDICINE
Payer: MEDICARE

## 2020-08-26 VITALS
SYSTOLIC BLOOD PRESSURE: 146 MMHG | TEMPERATURE: 98 F | HEIGHT: 59.45 IN | DIASTOLIC BLOOD PRESSURE: 77 MMHG | RESPIRATION RATE: 16 BRPM | WEIGHT: 172 LBS | HEART RATE: 76 BPM | BODY MASS INDEX: 34.22 KG/M2 | OXYGEN SATURATION: 96 %

## 2020-08-26 DIAGNOSIS — G47.9 SLEEP DISTURBANCE: ICD-10-CM

## 2020-08-26 DIAGNOSIS — Z51.11 ENCOUNTER FOR CHEMOTHERAPY MANAGEMENT: Primary | ICD-10-CM

## 2020-08-26 DIAGNOSIS — C78.7 LIVER METASTASES (HCC): ICD-10-CM

## 2020-08-26 DIAGNOSIS — D64.81 ANEMIA ASSOCIATED WITH CHEMOTHERAPY: ICD-10-CM

## 2020-08-26 DIAGNOSIS — K59.03 DRUG-INDUCED CONSTIPATION: ICD-10-CM

## 2020-08-26 DIAGNOSIS — I26.99 PE (PULMONARY THROMBOEMBOLISM) (HCC): ICD-10-CM

## 2020-08-26 DIAGNOSIS — T45.1X5A ANEMIA ASSOCIATED WITH CHEMOTHERAPY: ICD-10-CM

## 2020-08-26 DIAGNOSIS — C24.9 BILIARY TRACT CANCER (HCC): Primary | ICD-10-CM

## 2020-08-26 DIAGNOSIS — C24.9 BILIARY TRACT CANCER (HCC): ICD-10-CM

## 2020-08-26 DIAGNOSIS — I82.431 ACUTE DEEP VEIN THROMBOSIS (DVT) OF RIGHT POPLITEAL VEIN (HCC): ICD-10-CM

## 2020-08-26 LAB
ALBUMIN SERPL-MCNC: 3.2 G/DL (ref 3.4–5)
ALBUMIN/GLOB SERPL: 1 {RATIO} (ref 1–2)
ALP LIVER SERPL-CCNC: 123 U/L (ref 55–142)
ALT SERPL-CCNC: 23 U/L (ref 13–56)
ANION GAP SERPL CALC-SCNC: 5 MMOL/L (ref 0–18)
AST SERPL-CCNC: 18 U/L (ref 15–37)
BASOPHILS # BLD AUTO: 0.05 X10(3) UL (ref 0–0.2)
BASOPHILS NFR BLD AUTO: 0.9 %
BILIRUB SERPL-MCNC: 0.4 MG/DL (ref 0.1–2)
BUN BLD-MCNC: 20 MG/DL (ref 7–18)
BUN/CREAT SERPL: 19.8 (ref 10–20)
CALCIUM BLD-MCNC: 8.4 MG/DL (ref 8.5–10.1)
CANCER AG19-9 SERPL-ACNC: ABNORMAL U/ML (ref ?–37)
CHLORIDE SERPL-SCNC: 106 MMOL/L (ref 98–112)
CO2 SERPL-SCNC: 27 MMOL/L (ref 21–32)
CREAT BLD-MCNC: 1.01 MG/DL (ref 0.55–1.02)
DEPRECATED RDW RBC AUTO: 71.4 FL (ref 35.1–46.3)
EOSINOPHIL # BLD AUTO: 0.19 X10(3) UL (ref 0–0.7)
EOSINOPHIL NFR BLD AUTO: 3.3 %
ERYTHROCYTE [DISTWIDTH] IN BLOOD BY AUTOMATED COUNT: 20.4 % (ref 11–15)
GLOBULIN PLAS-MCNC: 3.2 G/DL (ref 2.8–4.4)
GLUCOSE BLD-MCNC: 95 MG/DL (ref 70–99)
HCT VFR BLD AUTO: 33.2 % (ref 35–48)
HGB BLD-MCNC: 10.4 G/DL (ref 12–16)
IMM GRANULOCYTES # BLD AUTO: 0.02 X10(3) UL (ref 0–1)
IMM GRANULOCYTES NFR BLD: 0.3 %
LYMPHOCYTES # BLD AUTO: 0.81 X10(3) UL (ref 1–4)
LYMPHOCYTES NFR BLD AUTO: 13.9 %
M PROTEIN MFR SERPL ELPH: 6.4 G/DL (ref 6.4–8.2)
MCH RBC QN AUTO: 30.9 PG (ref 26–34)
MCHC RBC AUTO-ENTMCNC: 31.3 G/DL (ref 31–37)
MCV RBC AUTO: 98.5 FL (ref 80–100)
MONOCYTES # BLD AUTO: 0.78 X10(3) UL (ref 0.1–1)
MONOCYTES NFR BLD AUTO: 13.4 %
NEUTROPHILS # BLD AUTO: 3.99 X10 (3) UL (ref 1.5–7.7)
NEUTROPHILS # BLD AUTO: 3.99 X10(3) UL (ref 1.5–7.7)
NEUTROPHILS NFR BLD AUTO: 68.2 %
OSMOLALITY SERPL CALC.SUM OF ELEC: 288 MOSM/KG (ref 275–295)
PATIENT FASTING Y/N/NP: NO
PLATELET # BLD AUTO: 252 10(3)UL (ref 150–450)
PLATELET MORPHOLOGY: NORMAL
POTASSIUM SERPL-SCNC: 3.8 MMOL/L (ref 3.5–5.1)
RBC # BLD AUTO: 3.37 X10(6)UL (ref 3.8–5.3)
SODIUM SERPL-SCNC: 138 MMOL/L (ref 136–145)
WBC # BLD AUTO: 5.8 X10(3) UL (ref 4–11)

## 2020-08-26 PROCEDURE — 96376 TX/PRO/DX INJ SAME DRUG ADON: CPT

## 2020-08-26 PROCEDURE — 80053 COMPREHEN METABOLIC PANEL: CPT

## 2020-08-26 PROCEDURE — 86301 IMMUNOASSAY TUMOR CA 19-9: CPT

## 2020-08-26 PROCEDURE — 99215 OFFICE O/P EST HI 40 MIN: CPT | Performed by: CLINICAL NURSE SPECIALIST

## 2020-08-26 PROCEDURE — 96361 HYDRATE IV INFUSION ADD-ON: CPT

## 2020-08-26 PROCEDURE — 96366 THER/PROPH/DIAG IV INF ADDON: CPT

## 2020-08-26 PROCEDURE — 85025 COMPLETE CBC W/AUTO DIFF WBC: CPT

## 2020-08-26 PROCEDURE — 96417 CHEMO IV INFUS EACH ADDL SEQ: CPT

## 2020-08-26 PROCEDURE — 96375 TX/PRO/DX INJ NEW DRUG ADDON: CPT

## 2020-08-26 PROCEDURE — 96367 TX/PROPH/DG ADDL SEQ IV INF: CPT

## 2020-08-26 PROCEDURE — 96413 CHEMO IV INFUSION 1 HR: CPT

## 2020-08-26 RX ORDER — SODIUM CHLORIDE 9 MG/ML
1000 INJECTION, SOLUTION INTRAVENOUS ONCE
Status: CANCELLED | OUTPATIENT
Start: 2020-08-26

## 2020-08-26 RX ORDER — SODIUM CHLORIDE 9 MG/ML
1000 INJECTION, SOLUTION INTRAVENOUS ONCE
Status: COMPLETED | OUTPATIENT
Start: 2020-08-26 | End: 2020-08-26

## 2020-08-26 RX ADMIN — SODIUM CHLORIDE 1000 ML: 9 INJECTION, SOLUTION INTRAVENOUS at 13:56:00

## 2020-08-26 NOTE — PROGRESS NOTES
Patient presents with: Follow - Up: APN assessment prior to treatment    Pt is here for treatment; C3 D1 gemzar, cisplatin is expected. Pt states she is tolerating her treatment well so far; only complaint is with taking her anti-emetics.  She gets whoozy

## 2020-08-26 NOTE — PROGRESS NOTES
[facility] Progress Note    Patient Name: Abbey Stein   YOB: 1945   Medical Record Number: [id number]   CSN: [id number]   Date of visit: 8/26/2020  Provider: ENA Curtis  Referring Physician: No ref.  provider found    Problem Lytic therapy was not recommended especially in the setting of possible malignancy. Dedicated CT of the abdomen and pelvis confirmed presence of numerous intrahepatic lesions.  No pancreatic mass lesion was seen but there was a large complex cystic appearin Final Diagnosis:   Needle core biopsy of hepatic mass:   -POSITIVE for metastatic adenocarcinoma, consistent with a pancreato-biliary primary. -(See comment).    Electronically signed by Han Tanner MD on 6/30/2020 at 1329       Final Diagnosis Co (XARELTO) 20 MG Oral Tab, Take 20 mg by mouth daily with food. , Disp: , Rfl:   •  Ondansetron HCl (ZOFRAN) 8 MG tablet, Take 1 tablet (8 mg total) by mouth every 8 (eight) hours as needed for Nausea., Disp: 30 tablet, Rfl: 3  •  Prochlorperazine Maleate (C Calcium, Total 8.4 (L) 8.5 - 10.1 mg/dL    Calculated Osmolality 288 275 - 295 mOsm/kg    GFR, Non- 55 (L) >=60    GFR, -American 63 >=60    AST 18 15 - 37 U/L    ALT 23 13 - 56 U/L    Alkaline Phosphatase 123 55 - 142 U/L    Bili NRDR ([address] [place] [address] Rd) which   includes the Dose Index Registry. PATIENT STATED HISTORY:(As transcribed by Technologist)  History of biliary tract cancer.       CONTRAST USED:  89cc of Omnipaque 350     FINDINGS:       CHEST:    LUNGS: measurements. SPLEEN:  Evolved infarction of anterior spleen. Stable small splenic cyst or hemangioma. KIDNEYS:  No mass, obstruction, or calcification. ADRENALS:  No mass or enlargement. AORTA:  No aneurysm or dissection.     RETROPERITONEUM:  No moving forward. As far as her antiemetics are concerned, she seems to have side effects associated with both Zofran and Compazine. She has not had any nausea issues.   She has been aggressive with nausea prophylaxis therefore advised her to back off and

## 2020-08-26 NOTE — PROGRESS NOTES
Pt here for C 3 D 1 Gemzar/cisplatin.   Arrives Ambulating independently, accompanied by Self           Patient reports possible pregnancy since last therapy cycle: Not Applicable    Modifications in dose or schedule: No     Frequency of blood return and si

## 2020-08-31 ENCOUNTER — TELEPHONE (OUTPATIENT)
Dept: HEMATOLOGY/ONCOLOGY | Facility: HOSPITAL | Age: 75
End: 2020-08-31

## 2020-08-31 NOTE — TELEPHONE ENCOUNTER
Jazmine Baron called saying she has some questins from her appointment with Jasper winters, and would like ot speak with Denver Gab. She was told she may see Denver Gab Wednesday, but there is no availability or f/u scheduled.

## 2020-09-01 ENCOUNTER — TELEPHONE (OUTPATIENT)
Dept: HEMATOLOGY/ONCOLOGY | Facility: HOSPITAL | Age: 75
End: 2020-09-01

## 2020-09-02 ENCOUNTER — OFFICE VISIT (OUTPATIENT)
Dept: HEMATOLOGY/ONCOLOGY | Facility: HOSPITAL | Age: 75
End: 2020-09-02
Attending: INTERNAL MEDICINE
Payer: MEDICARE

## 2020-09-02 VITALS
RESPIRATION RATE: 18 BRPM | TEMPERATURE: 98 F | OXYGEN SATURATION: 98 % | SYSTOLIC BLOOD PRESSURE: 148 MMHG | WEIGHT: 168.63 LBS | DIASTOLIC BLOOD PRESSURE: 83 MMHG | HEIGHT: 59.45 IN | HEART RATE: 77 BPM | BODY MASS INDEX: 33.55 KG/M2

## 2020-09-02 DIAGNOSIS — C24.9 BILIARY TRACT CANCER (HCC): Primary | ICD-10-CM

## 2020-09-02 LAB
BASOPHILS # BLD AUTO: 0.08 X10(3) UL (ref 0–0.2)
BASOPHILS NFR BLD AUTO: 2.7 %
DEPRECATED RDW RBC AUTO: 73.6 FL (ref 35.1–46.3)
EOSINOPHIL # BLD AUTO: 0.08 X10(3) UL (ref 0–0.7)
EOSINOPHIL NFR BLD AUTO: 2.7 %
ERYTHROCYTE [DISTWIDTH] IN BLOOD BY AUTOMATED COUNT: 20 % (ref 11–15)
HCT VFR BLD AUTO: 33.8 % (ref 35–48)
HGB BLD-MCNC: 10.4 G/DL (ref 12–16)
IMM GRANULOCYTES # BLD AUTO: 0.02 X10(3) UL (ref 0–1)
IMM GRANULOCYTES NFR BLD: 0.7 %
LYMPHOCYTES # BLD AUTO: 0.98 X10(3) UL (ref 1–4)
LYMPHOCYTES NFR BLD AUTO: 33.7 %
MCH RBC QN AUTO: 31.2 PG (ref 26–34)
MCHC RBC AUTO-ENTMCNC: 30.8 G/DL (ref 31–37)
MCV RBC AUTO: 101.5 FL (ref 80–100)
MONOCYTES # BLD AUTO: 0.35 X10(3) UL (ref 0.1–1)
MONOCYTES NFR BLD AUTO: 12 %
NEUTROPHILS # BLD AUTO: 1.4 X10 (3) UL (ref 1.5–7.7)
NEUTROPHILS # BLD AUTO: 1.4 X10(3) UL (ref 1.5–7.7)
NEUTROPHILS NFR BLD AUTO: 48.2 %
PLATELET # BLD AUTO: 220 10(3)UL (ref 150–450)
PLATELET MORPHOLOGY: NORMAL
RBC # BLD AUTO: 3.33 X10(6)UL (ref 3.8–5.3)
WBC # BLD AUTO: 2.9 X10(3) UL (ref 4–11)

## 2020-09-02 PROCEDURE — 96375 TX/PRO/DX INJ NEW DRUG ADDON: CPT

## 2020-09-02 PROCEDURE — 85025 COMPLETE CBC W/AUTO DIFF WBC: CPT

## 2020-09-02 PROCEDURE — 96413 CHEMO IV INFUSION 1 HR: CPT

## 2020-09-02 NOTE — PROGRESS NOTES
Pt here for C 3 D 8 gemzar.   Arrives Ambulating independently, accompanied by Self           Patient reports possible pregnancy since last therapy cycle: Not Applicable    Modifications in dose or schedule: No     Frequency of blood return and site check t

## 2020-09-16 ENCOUNTER — OFFICE VISIT (OUTPATIENT)
Dept: HEMATOLOGY/ONCOLOGY | Facility: HOSPITAL | Age: 75
End: 2020-09-16
Attending: INTERNAL MEDICINE
Payer: MEDICARE

## 2020-09-16 VITALS
TEMPERATURE: 98 F | SYSTOLIC BLOOD PRESSURE: 130 MMHG | OXYGEN SATURATION: 98 % | DIASTOLIC BLOOD PRESSURE: 76 MMHG | HEART RATE: 73 BPM | BODY MASS INDEX: 34.69 KG/M2 | HEIGHT: 59.45 IN | WEIGHT: 174.38 LBS | RESPIRATION RATE: 16 BRPM

## 2020-09-16 DIAGNOSIS — T45.1X5A ANEMIA DUE TO ANTINEOPLASTIC CHEMOTHERAPY: ICD-10-CM

## 2020-09-16 DIAGNOSIS — I26.99 PE (PULMONARY THROMBOEMBOLISM) (HCC): ICD-10-CM

## 2020-09-16 DIAGNOSIS — I89.0 LYMPHEDEMA OF BOTH LOWER EXTREMITIES: ICD-10-CM

## 2020-09-16 DIAGNOSIS — D64.81 ANEMIA ASSOCIATED WITH CHEMOTHERAPY: ICD-10-CM

## 2020-09-16 DIAGNOSIS — G89.3 NEOPLASM RELATED PAIN: ICD-10-CM

## 2020-09-16 DIAGNOSIS — C54.1 ENDOMETRIAL CANCER (HCC): ICD-10-CM

## 2020-09-16 DIAGNOSIS — D75.839 THROMBOCYTOSIS: ICD-10-CM

## 2020-09-16 DIAGNOSIS — C24.9 BILIARY TRACT CANCER (HCC): Primary | ICD-10-CM

## 2020-09-16 DIAGNOSIS — C78.7 LIVER METASTASES (HCC): ICD-10-CM

## 2020-09-16 DIAGNOSIS — I82.431 ACUTE DEEP VEIN THROMBOSIS (DVT) OF RIGHT POPLITEAL VEIN (HCC): ICD-10-CM

## 2020-09-16 DIAGNOSIS — T45.1X5A ANEMIA ASSOCIATED WITH CHEMOTHERAPY: ICD-10-CM

## 2020-09-16 DIAGNOSIS — D64.81 ANEMIA DUE TO ANTINEOPLASTIC CHEMOTHERAPY: ICD-10-CM

## 2020-09-16 LAB
ALBUMIN SERPL-MCNC: 3.2 G/DL (ref 3.4–5)
ALBUMIN/GLOB SERPL: 1 {RATIO} (ref 1–2)
ALP LIVER SERPL-CCNC: 113 U/L (ref 55–142)
ALT SERPL-CCNC: 22 U/L (ref 13–56)
ANION GAP SERPL CALC-SCNC: 6 MMOL/L (ref 0–18)
AST SERPL-CCNC: 18 U/L (ref 15–37)
BASOPHILS # BLD AUTO: 0.03 X10(3) UL (ref 0–0.2)
BASOPHILS NFR BLD AUTO: 1 %
BILIRUB SERPL-MCNC: 0.3 MG/DL (ref 0.1–2)
BUN BLD-MCNC: 18 MG/DL (ref 7–18)
BUN/CREAT SERPL: 16.8 (ref 10–20)
CALCIUM BLD-MCNC: 8.8 MG/DL (ref 8.5–10.1)
CANCER AG19-9 SERPL-ACNC: ABNORMAL U/ML (ref ?–37)
CHLORIDE SERPL-SCNC: 108 MMOL/L (ref 98–112)
CO2 SERPL-SCNC: 25 MMOL/L (ref 21–32)
CREAT BLD-MCNC: 1.07 MG/DL (ref 0.55–1.02)
DEPRECATED RDW RBC AUTO: 82.2 FL (ref 35.1–46.3)
EOSINOPHIL # BLD AUTO: 0.09 X10(3) UL (ref 0–0.7)
EOSINOPHIL NFR BLD AUTO: 2.9 %
ERYTHROCYTE [DISTWIDTH] IN BLOOD BY AUTOMATED COUNT: 22.5 % (ref 11–15)
GLOBULIN PLAS-MCNC: 3.2 G/DL (ref 2.8–4.4)
GLUCOSE BLD-MCNC: 111 MG/DL (ref 70–99)
HCT VFR BLD AUTO: 32.3 % (ref 35–48)
HGB BLD-MCNC: 10.4 G/DL (ref 12–16)
IMM GRANULOCYTES # BLD AUTO: 0.01 X10(3) UL (ref 0–1)
IMM GRANULOCYTES NFR BLD: 0.3 %
LYMPHOCYTES # BLD AUTO: 0.66 X10(3) UL (ref 1–4)
LYMPHOCYTES NFR BLD AUTO: 21.2 %
M PROTEIN MFR SERPL ELPH: 6.4 G/DL (ref 6.4–8.2)
MCH RBC QN AUTO: 32.5 PG (ref 26–34)
MCHC RBC AUTO-ENTMCNC: 32.2 G/DL (ref 31–37)
MCV RBC AUTO: 100.9 FL (ref 80–100)
MONOCYTES # BLD AUTO: 0.57 X10(3) UL (ref 0.1–1)
MONOCYTES NFR BLD AUTO: 18.3 %
NEUTROPHILS # BLD AUTO: 1.75 X10 (3) UL (ref 1.5–7.7)
NEUTROPHILS # BLD AUTO: 1.75 X10(3) UL (ref 1.5–7.7)
NEUTROPHILS NFR BLD AUTO: 56.3 %
OSMOLALITY SERPL CALC.SUM OF ELEC: 291 MOSM/KG (ref 275–295)
PLATELET # BLD AUTO: 256 10(3)UL (ref 150–450)
PLATELET MORPHOLOGY: NORMAL
POTASSIUM SERPL-SCNC: 3.8 MMOL/L (ref 3.5–5.1)
RBC # BLD AUTO: 3.2 X10(6)UL (ref 3.8–5.3)
SODIUM SERPL-SCNC: 139 MMOL/L (ref 136–145)
WBC # BLD AUTO: 3.1 X10(3) UL (ref 4–11)

## 2020-09-16 PROCEDURE — 96361 HYDRATE IV INFUSION ADD-ON: CPT

## 2020-09-16 PROCEDURE — 96376 TX/PRO/DX INJ SAME DRUG ADON: CPT

## 2020-09-16 PROCEDURE — 99215 OFFICE O/P EST HI 40 MIN: CPT | Performed by: INTERNAL MEDICINE

## 2020-09-16 PROCEDURE — 85025 COMPLETE CBC W/AUTO DIFF WBC: CPT

## 2020-09-16 PROCEDURE — 96417 CHEMO IV INFUS EACH ADDL SEQ: CPT

## 2020-09-16 PROCEDURE — 96367 TX/PROPH/DG ADDL SEQ IV INF: CPT

## 2020-09-16 PROCEDURE — 86301 IMMUNOASSAY TUMOR CA 19-9: CPT

## 2020-09-16 PROCEDURE — 80053 COMPREHEN METABOLIC PANEL: CPT

## 2020-09-16 PROCEDURE — 96366 THER/PROPH/DIAG IV INF ADDON: CPT

## 2020-09-16 PROCEDURE — 96375 TX/PRO/DX INJ NEW DRUG ADDON: CPT

## 2020-09-16 PROCEDURE — 96413 CHEMO IV INFUSION 1 HR: CPT

## 2020-09-16 RX ORDER — SODIUM CHLORIDE 9 MG/ML
1000 INJECTION, SOLUTION INTRAVENOUS ONCE
Status: CANCELLED | OUTPATIENT
Start: 2020-09-16

## 2020-09-16 RX ORDER — SODIUM CHLORIDE 0.9 % (FLUSH) 0.9 %
10 SYRINGE (ML) INJECTION ONCE
Status: COMPLETED | OUTPATIENT
Start: 2020-09-16 | End: 2020-09-16

## 2020-09-16 RX ORDER — SODIUM CHLORIDE 0.9 % (FLUSH) 0.9 %
10 SYRINGE (ML) INJECTION ONCE
Status: CANCELLED | OUTPATIENT
Start: 2020-09-16

## 2020-09-16 RX ORDER — SODIUM CHLORIDE 9 MG/ML
1000 INJECTION, SOLUTION INTRAVENOUS ONCE
Status: COMPLETED | OUTPATIENT
Start: 2020-09-16 | End: 2020-09-16

## 2020-09-16 RX ADMIN — SODIUM CHLORIDE 1000 ML: 9 INJECTION, SOLUTION INTRAVENOUS at 14:01:00

## 2020-09-16 RX ADMIN — SODIUM CHLORIDE 0.9 % (FLUSH) 10 ML: 0.9 % SYRINGE (ML) INJECTION at 15:24:00

## 2020-09-16 NOTE — PROGRESS NOTES
[facility] Progress Note    Patient Name: Kilo Grace   YOB: 1945   Medical Record Number: [id number]   CSN: [id number]   Attending Physician: Simona Dill M.D.    Referring Physician: ENA Gutierrez Dr.    Date o Guardant/  Tempus:  Somatic - Potentially Actionable Variant Allele Fraction  p.G12V Missense variant (exon 2) - GOF 33.3%  p.C242fs Frameshift - LOF 28.9%  p. E545D Missense variant (exon 9) - GOF 20.9%  Copy number loss  Somatic - Biologically Relevant  C MG Oral Tab, Take 2 tablets (1,000 mg total) by mouth every 8 (eight) hours as needed for Pain., Disp: 60 tablet, Rfl: 0  •  Meclizine HCl 25 MG Oral Tab, Take 1 tablet (25 mg total) by mouth 3 (three) times daily as needed. , Disp: 20 tablet, Rfl: 0  •  Fe cancer prostate   • Cancer Paternal Uncle         lung   • Cancer Paternal Uncle         camcer       Gyne History:  OB History    No obstetric history on file.     Psychosocial History:  Social History    Socioeconomic History      Marital status:  declined 11/06, 11/07, 11/08, 10/09, 10/10      Breast exam: 12/08, 10/09, declined 10/10      DEXA: declined 11/06, 11/07, 11/08, 10/09, 10/10      Cholesterol: 8/08 (168), 7/09 (138)      Colon: declined 11/06, 11/07, 12/08, 10/09, 10/10      H/O: 10/06 09/16/2020    MOABSO 0.57 09/16/2020    EOABSO 0.09 09/16/2020    BAABSO 0.03 09/16/2020     Lab Results   Component Value Date     (H) 09/16/2020    BUN 18 09/16/2020    BUNCREA 16.8 09/16/2020    CREATSERUM 1.07 (H) 09/16/2020    ANIONGAP 6 09/16/ CHEST:    LUNGS:  Low-attenuation pleural-based mass in posterior left costophrenic sulcus measures 2.2 x 1.4 cm on image 125 of series 3, previously there was alveolar opacity without discrete mass in this area.   Stable linear opacity in left lung a RETROPERITONEUM:  No mass or adenopathy. BOWEL/MESENTERY:  Focal soft tissue thickening/mass along the lateral wall of the cecum measures 2.2 x 2.1 cm on image 63 of series 6. No bowel distention. ABDOMINAL WALL:  Mild subcutaneous edema.   No mass coronal MPR imaging was performed. Dose reduction techniques were used. Dose information is   transmitted to the [facility]) NRDR ([address]) which includes the Dose Index Registry.      PATIENT STATED HISTORY heading. KIDNEYS:  No mass, obstruction, or calcification. ADRENALS:  No mass or enlargement. AORTA/VASCULAR:  No aneurysm or dissection. RETROPERITONEUM:  No mass or adenopathy.     BOWEL/MESENTERY:  No visible mass, obstruction, or bowel wall th follow-up imaging is recommended in 3 months for further assessment. Dictated by: Deep King DO on 6/25/2020 at 2:25 PM       Finalized by:  Deep King DO on 6/25/2020 at 2:42 PM     PROCEDURE:  CT ANGIOGRAPHY, CHEST (CPT=71275)     COMPARISON: hypoattenuating right lobe thyroid nodule. There is also a 11 x 8 mm hypoattenuating lower pole right lobe thyroid nodule.   LIMITED ABDOMEN:  There are numerous hypo attenuating hepatic masses identified within the visualized liver caudally concerning for The ordering clinician, Dr. Sasha Centeno was called and notified of the critical findings by phone at 2124 hours on 6/24/2020. There is appropriate read back.           Dictated by: Cedric Mora MD on 6/24/2020 at 9:03 PM       Finalized by: Gamal Bales with have resolved. She will continue    3. Right leg swelling- from DVT but also has a h/o lymphedema of this leg previously. Back to wearing compression stockings. Swelling has come down and she feels she is close to basleine     4.  Abdominal pain- impro

## 2020-09-16 NOTE — PROGRESS NOTES
Pt here for C4D1.   Arrives Ambulating independently, accompanied by Spouse           Patient reports possible pregnancy since last therapy cycle: No    Modifications in dose or schedule: No     Frequency of blood return and site check [facility]

## 2020-09-23 ENCOUNTER — OFFICE VISIT (OUTPATIENT)
Dept: HEMATOLOGY/ONCOLOGY | Facility: HOSPITAL | Age: 75
End: 2020-09-23
Attending: INTERNAL MEDICINE
Payer: MEDICARE

## 2020-09-23 VITALS
HEIGHT: 59.45 IN | TEMPERATURE: 98 F | HEART RATE: 74 BPM | SYSTOLIC BLOOD PRESSURE: 141 MMHG | WEIGHT: 169 LBS | BODY MASS INDEX: 33.62 KG/M2 | OXYGEN SATURATION: 99 % | RESPIRATION RATE: 16 BRPM | DIASTOLIC BLOOD PRESSURE: 62 MMHG

## 2020-09-23 DIAGNOSIS — C24.9 BILIARY TRACT CANCER (HCC): Primary | ICD-10-CM

## 2020-09-23 LAB
BASOPHILS # BLD AUTO: 0.06 X10(3) UL (ref 0–0.2)
BASOPHILS NFR BLD AUTO: 2.2 %
DEPRECATED RDW RBC AUTO: 77.5 FL (ref 35.1–46.3)
EOSINOPHIL # BLD AUTO: 0.04 X10(3) UL (ref 0–0.7)
EOSINOPHIL NFR BLD AUTO: 1.5 %
ERYTHROCYTE [DISTWIDTH] IN BLOOD BY AUTOMATED COUNT: 21.2 % (ref 11–15)
HCT VFR BLD AUTO: 29.7 %
HGB BLD-MCNC: 9.8 G/DL
IMM GRANULOCYTES # BLD AUTO: 0.03 X10(3) UL (ref 0–1)
IMM GRANULOCYTES NFR BLD: 1.1 %
LYMPHOCYTES # BLD AUTO: 1.02 X10(3) UL (ref 1–4)
LYMPHOCYTES NFR BLD AUTO: 37.5 %
MCH RBC QN AUTO: 33.1 PG (ref 26–34)
MCHC RBC AUTO-ENTMCNC: 33 G/DL (ref 31–37)
MCV RBC AUTO: 100.3 FL
MONOCYTES # BLD AUTO: 0.33 X10(3) UL (ref 0.1–1)
MONOCYTES NFR BLD AUTO: 12.1 %
NEUTROPHILS # BLD AUTO: 1.24 X10 (3) UL (ref 1.5–7.7)
NEUTROPHILS # BLD AUTO: 1.24 X10(3) UL (ref 1.5–7.7)
NEUTROPHILS NFR BLD AUTO: 45.6 %
PLATELET # BLD AUTO: 217 10(3)UL (ref 150–450)
PLATELET MORPHOLOGY: NORMAL
RBC # BLD AUTO: 2.96 X10(6)UL
WBC # BLD AUTO: 2.7 X10(3) UL (ref 4–11)

## 2020-09-23 PROCEDURE — 85025 COMPLETE CBC W/AUTO DIFF WBC: CPT

## 2020-09-23 PROCEDURE — 96375 TX/PRO/DX INJ NEW DRUG ADDON: CPT

## 2020-09-23 PROCEDURE — 96413 CHEMO IV INFUSION 1 HR: CPT

## 2020-09-23 RX ORDER — SODIUM CHLORIDE 0.9 % (FLUSH) 0.9 %
10 SYRINGE (ML) INJECTION ONCE
Status: CANCELLED | OUTPATIENT
Start: 2020-09-23

## 2020-09-23 RX ORDER — SODIUM CHLORIDE 0.9 % (FLUSH) 0.9 %
10 SYRINGE (ML) INJECTION ONCE
Status: COMPLETED | OUTPATIENT
Start: 2020-09-23 | End: 2020-09-23

## 2020-09-23 RX ADMIN — SODIUM CHLORIDE 0.9 % (FLUSH) 10 ML: 0.9 % SYRINGE (ML) INJECTION at 12:30:00

## 2020-09-23 NOTE — PROGRESS NOTES
Pt here for C4D8 Gemzar.   Arrives Ambulating independently, accompanied by Self           Patient reports possible pregnancy since last therapy cycle: No    Modifications in dose or schedule: No     Frequency of blood return and site check throughout admin

## 2020-09-28 ENCOUNTER — OFFICE VISIT (OUTPATIENT)
Dept: INTERNAL MEDICINE CLINIC | Facility: CLINIC | Age: 75
End: 2020-09-28
Payer: MEDICARE

## 2020-09-28 ENCOUNTER — LAB ENCOUNTER (OUTPATIENT)
Dept: LAB | Age: 75
End: 2020-09-28
Attending: NURSE PRACTITIONER
Payer: MEDICARE

## 2020-09-28 ENCOUNTER — HOSPITAL ENCOUNTER (OUTPATIENT)
Dept: CT IMAGING | Facility: HOSPITAL | Age: 75
Discharge: HOME | End: 2020-09-28
Attending: INTERNAL MEDICINE
Payer: MEDICARE

## 2020-09-28 VITALS
BODY MASS INDEX: 34.02 KG/M2 | SYSTOLIC BLOOD PRESSURE: 116 MMHG | HEIGHT: 59.45 IN | TEMPERATURE: 97 F | DIASTOLIC BLOOD PRESSURE: 66 MMHG | WEIGHT: 171 LBS | HEART RATE: 72 BPM

## 2020-09-28 DIAGNOSIS — Z85.42 HISTORY OF ENDOMETRIAL CANCER: ICD-10-CM

## 2020-09-28 DIAGNOSIS — I87.2 SAPHENOFEMORAL VENOUS REFLUX: ICD-10-CM

## 2020-09-28 DIAGNOSIS — D64.9 ANEMIA, UNSPECIFIED TYPE: ICD-10-CM

## 2020-09-28 DIAGNOSIS — D72.819 LEUKOPENIA, UNSPECIFIED TYPE: ICD-10-CM

## 2020-09-28 DIAGNOSIS — I82.431 ACUTE DEEP VEIN THROMBOSIS (DVT) OF RIGHT POPLITEAL VEIN (HCC): ICD-10-CM

## 2020-09-28 DIAGNOSIS — C24.9 BILIARY TRACT CANCER (HCC): ICD-10-CM

## 2020-09-28 DIAGNOSIS — Z12.31 ENCOUNTER FOR SCREENING MAMMOGRAM FOR MALIGNANT NEOPLASM OF BREAST: ICD-10-CM

## 2020-09-28 DIAGNOSIS — C78.7 LIVER METASTASES (HCC): ICD-10-CM

## 2020-09-28 DIAGNOSIS — I26.99 PULMONARY EMBOLISM AND INFARCTION (HCC): ICD-10-CM

## 2020-09-28 DIAGNOSIS — E78.00 PURE HYPERCHOLESTEROLEMIA: ICD-10-CM

## 2020-09-28 DIAGNOSIS — R73.9 HYPERGLYCEMIA: ICD-10-CM

## 2020-09-28 DIAGNOSIS — Z00.00 ENCOUNTER FOR ANNUAL HEALTH EXAMINATION: Primary | ICD-10-CM

## 2020-09-28 DIAGNOSIS — K86.89 PANCREATIC MASS: ICD-10-CM

## 2020-09-28 DIAGNOSIS — I89.0 LYMPHEDEMA OF BOTH LOWER EXTREMITIES: ICD-10-CM

## 2020-09-28 LAB
CHOLEST SMN-MCNC: 226 MG/DL (ref ?–200)
EST. AVERAGE GLUCOSE BLD GHB EST-MCNC: 117 MG/DL (ref 68–126)
HBA1C MFR BLD HPLC: 5.7 % (ref ?–5.7)
HDLC SERPL-MCNC: 124 MG/DL (ref 40–59)
LDLC SERPL CALC-MCNC: 78 MG/DL (ref ?–100)
NONHDLC SERPL-MCNC: 102 MG/DL (ref ?–130)
PATIENT FASTING Y/N/NP: YES
TRIGL SERPL-MCNC: 118 MG/DL (ref 30–149)
VLDLC SERPL CALC-MCNC: 24 MG/DL (ref 0–30)

## 2020-09-28 PROCEDURE — 74177 CT ABD & PELVIS W/CONTRAST: CPT | Performed by: INTERNAL MEDICINE

## 2020-09-28 PROCEDURE — G0439 PPPS, SUBSEQ VISIT: HCPCS | Performed by: NURSE PRACTITIONER

## 2020-09-28 PROCEDURE — 83036 HEMOGLOBIN GLYCOSYLATED A1C: CPT

## 2020-09-28 PROCEDURE — 80061 LIPID PANEL: CPT

## 2020-09-28 PROCEDURE — 71260 CT THORAX DX C+: CPT | Performed by: INTERNAL MEDICINE

## 2020-09-28 NOTE — PATIENT INSTRUCTIONS
Hardeep Bush's SCREENING SCHEDULE   Tests on this list are recommended by your physician but may not be covered, or covered at this frequency, by your insurer. Please check with your insurance carrier before scheduling to verify coverage.    PREVENTATIV following criteria:   • Men who are 73-68 years old and have smoked more than 100 cigarettes in their lifetime   • Anyone with a family history    Colorectal Cancer Screening  Covered up to Age 76     Colonoscopy Screen   Covered every 10 years- more often Influenza  Covered Annually Orders placed or performed in visit on 10/14/19   • FLU VACC HIGH DOSE PRSV FREE    Please get every year    Pneumococcal 13 (Prevnar)  Covered Once after 65 No orders found for this or any previous visit.  Please get once after

## 2020-09-28 NOTE — PROGRESS NOTES
HPI:   Monse Hickman is a 77year old female who presents for a Medicare Subsequent Annual Wellness visit (Pt already had Initial Annual Wellness). New patient to establish.     Saphenofemoral venous reflux  Stable  compression    Pulmonary embolism and things (over the last two weeks)?: Not at all  Feeling down, depressed, or hopeless (over the last two weeks)?: Not at all  PHQ-2 SCORE: 0      Advanced Directive:  She does NOT have a Living Will on file in [address] [place] [address] Rd.    The patient has this document but we do Results   Component Value Date    WBC 2.7 (L) 09/23/2020    HGB 9.8 (L) 09/23/2020    .0 09/23/2020        ALLERGIES:   She is allergic to penicillins and flonase [fluticasone propionate].     CURRENT MEDICATIONS:     •  Rivaroxaban (XARELTO) 20 MG O of alcohol per week. She reports that she does not use drugs. REVIEW OF SYSTEMS:   Review of Systems   Constitutional: Negative for fever, chills and fatigue. No distress.   HENT: Negative for hearing loss, congestion, sore throat, neck pain and dental Pelvic: Deferred   Extremities: Extremities normal, atraumatic, right>left lymphedema.        Skin: Skin color, texture, turgor normal,   Lymph nodes: Cervical, supraclavicular, and axillary nodes normal   Neurologic: Normal    and Breasts:  normal appear pneumonia vaccines. She will go to pharmacy next week after her chemo recovery week. Diet assessment: good     PLAN:  The patient indicates understanding of these issues and agrees to the plan. Reinforced healthy diet, lifestyle, and exercise. Diabetics, FHx Glaucoma, AA>50, > 65 No flowsheet data found. Bone Density Screening      Dexascan Every two years Last Dexa Scan:   DEXA BONE DENSITY, AXIAL (CPT=77080) 10/04/2011    No flowsheet data found.     Pap and Pelvic      Pap: Every 3

## 2020-10-07 ENCOUNTER — OFFICE VISIT (OUTPATIENT)
Dept: HEMATOLOGY/ONCOLOGY | Facility: HOSPITAL | Age: 75
End: 2020-10-07
Attending: INTERNAL MEDICINE
Payer: MEDICARE

## 2020-10-07 VITALS
SYSTOLIC BLOOD PRESSURE: 157 MMHG | HEART RATE: 73 BPM | DIASTOLIC BLOOD PRESSURE: 78 MMHG | HEIGHT: 59.45 IN | TEMPERATURE: 98 F | BODY MASS INDEX: 34.22 KG/M2 | WEIGHT: 172 LBS | OXYGEN SATURATION: 99 % | RESPIRATION RATE: 16 BRPM

## 2020-10-07 DIAGNOSIS — D64.81 ANEMIA ASSOCIATED WITH CHEMOTHERAPY: ICD-10-CM

## 2020-10-07 DIAGNOSIS — C78.7 LIVER METASTASES (HCC): ICD-10-CM

## 2020-10-07 DIAGNOSIS — I82.431 ACUTE DEEP VEIN THROMBOSIS (DVT) OF RIGHT POPLITEAL VEIN (HCC): ICD-10-CM

## 2020-10-07 DIAGNOSIS — I89.0 LYMPHEDEMA OF BOTH LOWER EXTREMITIES: ICD-10-CM

## 2020-10-07 DIAGNOSIS — T45.1X5A ANEMIA DUE TO ANTINEOPLASTIC CHEMOTHERAPY: ICD-10-CM

## 2020-10-07 DIAGNOSIS — C80.1 PANCREATOBILIARY-TYPE CARCINOMA (HCC): Primary | ICD-10-CM

## 2020-10-07 DIAGNOSIS — G89.3 NEOPLASM RELATED PAIN: ICD-10-CM

## 2020-10-07 DIAGNOSIS — D64.81 ANEMIA DUE TO ANTINEOPLASTIC CHEMOTHERAPY: ICD-10-CM

## 2020-10-07 DIAGNOSIS — C24.9 BILIARY TRACT CANCER (HCC): Primary | ICD-10-CM

## 2020-10-07 DIAGNOSIS — D75.839 THROMBOCYTOSIS: ICD-10-CM

## 2020-10-07 DIAGNOSIS — T45.1X5A ANEMIA ASSOCIATED WITH CHEMOTHERAPY: ICD-10-CM

## 2020-10-07 DIAGNOSIS — I26.99 PE (PULMONARY THROMBOEMBOLISM) (HCC): ICD-10-CM

## 2020-10-07 DIAGNOSIS — C54.1 ENDOMETRIAL CANCER (HCC): ICD-10-CM

## 2020-10-07 PROCEDURE — 96375 TX/PRO/DX INJ NEW DRUG ADDON: CPT

## 2020-10-07 PROCEDURE — 96361 HYDRATE IV INFUSION ADD-ON: CPT

## 2020-10-07 PROCEDURE — 96417 CHEMO IV INFUS EACH ADDL SEQ: CPT

## 2020-10-07 PROCEDURE — 96367 TX/PROPH/DG ADDL SEQ IV INF: CPT

## 2020-10-07 PROCEDURE — 86301 IMMUNOASSAY TUMOR CA 19-9: CPT

## 2020-10-07 PROCEDURE — 96413 CHEMO IV INFUSION 1 HR: CPT

## 2020-10-07 PROCEDURE — 99215 OFFICE O/P EST HI 40 MIN: CPT | Performed by: INTERNAL MEDICINE

## 2020-10-07 PROCEDURE — 96366 THER/PROPH/DIAG IV INF ADDON: CPT

## 2020-10-07 PROCEDURE — 96376 TX/PRO/DX INJ SAME DRUG ADON: CPT

## 2020-10-07 PROCEDURE — 85025 COMPLETE CBC W/AUTO DIFF WBC: CPT

## 2020-10-07 PROCEDURE — 80053 COMPREHEN METABOLIC PANEL: CPT

## 2020-10-07 RX ORDER — SODIUM CHLORIDE 9 MG/ML
1000 INJECTION, SOLUTION INTRAVENOUS ONCE
Status: COMPLETED | OUTPATIENT
Start: 2020-10-07 | End: 2020-10-07

## 2020-10-07 RX ORDER — SODIUM CHLORIDE 9 MG/ML
1000 INJECTION, SOLUTION INTRAVENOUS ONCE
Status: CANCELLED | OUTPATIENT
Start: 2020-10-07

## 2020-10-07 RX ADMIN — SODIUM CHLORIDE 1000 ML: 9 INJECTION, SOLUTION INTRAVENOUS at 13:15:00

## 2020-10-07 NOTE — PROGRESS NOTES
[facility] Progress Note    Patient Name: Irineo Stein   YOB: 1945   Medical Record Number: [id number]   CSN: [id number]   Attending Physician: Josue Brewster M.D.    Referring Physician: ENA [address] Dr.    Date o Guardant/  Tempus:  Somatic - Potentially Actionable Variant Allele Fraction  p.G12V Missense variant (exon 2) - GOF 33.3%  p.C242fs Frameshift - LOF 28.9%  p. E545D Missense variant (exon 9) - GOF 20.9%  Copy number loss  Somatic - Biologically Relevant  C times daily as needed. , Disp: 20 tablet, Rfl: 0  •  Fexofenadine HCl (ALLEGRA) 180 MG Oral Tab, Take 180 mg by mouth daily.   , Disp: , Rfl:   •  CALCIUM + D OR, 1 tablet daily, Disp: , Rfl:   •  MULTIVITAMINS OR TABS, 1 TABLET DAILY, Disp: , Rfl:     Past Socioeconomic History      Marital status:       Spouse name: Not on file      Number of children: 2      Years of education: Not on file      Highest education level: Not on file    Occupational History      Occupation: retired    Social Needs declined 11/06, 11/07, 12/08, 10/09, 10/10      H/O: 10/06 (not returned)            calcium: takes MVI      exercise: walks/ golfs                          Allergies:    Penicillins             RASH  Flonase [Fluticason*        Comment:See office visit of 10/07/2020    CREATSERUM 1.16 (H) 10/07/2020    ANIONGAP 3 10/07/2020    GFRNAA 46 (L) 10/07/2020    GFRAA 54 (L) 10/07/2020    CA 8.7 10/07/2020    OSMOCALC 285 10/07/2020    ALKPHO 95 10/07/2020    AST 16 10/07/2020    ALT 19 10/07/2020    BILT 0.3 10/07 postinflammatory/infectious in nature, as a calcified granuloma is seen at the right base. Stable metastases are also considered. These measure up to 0.4 cm. No new pulmonary nodules or infiltrates. MEDIASTINUM:  Stable. No adenopathy.   ADE:  Stabl hernia. URINARY BLADDER:  Stable. Nondistended. PELVIC NODES:  Stable. None enlarged. PELVIC ORGANS:  Stable. Surgically absent uterus. BONES:  Stable. Severe degenerative changes, most pronounced lower thoracic discs and lumbar facets.   A few mm a image   26 of series 3. Stable scattered micronodules. MEDIASTINUM:  No mass or adenopathy. ADE:  No mass or adenopathy. CARDIAC:  No enlargement, pericardial thickening, or significant calcification. PLEURA:  Small right pleural effusion.   CHES BLADDER:  No visible focal wall thickening, lesion, or calculus. PELVIC NODES:  No adenopathy. PELVIC ORGANS:  Status post hysterectomy. BONES:  Stable degenerative changes of spine.   No osteolytic or osteoblastic lesion.          =====  CONCLUSION: since chemo    5. Thrombocytopenia- felt to be consumptive from previous VTE then developed reactive thrombocytosis from chemo. Back to normal     6. H/o ovarian and endometrial cancer- SEPIDEH. 7. Decreased white count- from chemo.  Not neutropenic and cou

## 2020-10-12 ENCOUNTER — TELEPHONE (OUTPATIENT)
Dept: INTERNAL MEDICINE CLINIC | Facility: CLINIC | Age: 75
End: 2020-10-12

## 2020-10-12 NOTE — TELEPHONE ENCOUNTER
She can proceed with pneumonia vaccine. Benadryl before she gets the vaccine. Can receive here. I don't see any interaction listed to by knowledge. If she is hesitant, ok to hold on pneumovax.

## 2020-10-12 NOTE — TELEPHONE ENCOUNTER
LOV 9/28/20 with SD. Patient states she used flonase once and woke up in the am with bright red face, no breathing difficulties. Please advise.

## 2020-10-12 NOTE — TELEPHONE ENCOUNTER
Pt stated she went to get her Flu vacc and was going to get her pneumonia as well but she was told by the pharmacist she is allergic to [facility] and so they weren't able to give her the pneumonia vacc.  Pt wants to know if there is anything she can do or jus

## 2020-10-12 NOTE — TELEPHONE ENCOUNTER
Patient notified per SD can proceed with pneumonia vaccine. Benadryl before you get the vaccine. Can receive here. SD cannot see any interaction listed to her knowledge. If  hesitant, ok to hold on pneumovax. Pt verbalizes understanding.

## 2020-10-12 NOTE — TELEPHONE ENCOUNTER
Immunizations/Injections    FLU VAC High Dose 65 YRS & Older PRSV Free (48893)10/14/2019   Fluzone Vaccine Medicare ()10/14/2019

## 2020-10-14 ENCOUNTER — OFFICE VISIT (OUTPATIENT)
Dept: HEMATOLOGY/ONCOLOGY | Facility: HOSPITAL | Age: 75
End: 2020-10-14
Attending: INTERNAL MEDICINE
Payer: MEDICARE

## 2020-10-14 VITALS
BODY MASS INDEX: 33.15 KG/M2 | RESPIRATION RATE: 16 BRPM | OXYGEN SATURATION: 97 % | TEMPERATURE: 97 F | DIASTOLIC BLOOD PRESSURE: 80 MMHG | SYSTOLIC BLOOD PRESSURE: 157 MMHG | WEIGHT: 166.63 LBS | HEIGHT: 59.45 IN | HEART RATE: 78 BPM

## 2020-10-14 DIAGNOSIS — C24.9 BILIARY TRACT CANCER (HCC): Primary | ICD-10-CM

## 2020-10-14 PROCEDURE — 85025 COMPLETE CBC W/AUTO DIFF WBC: CPT

## 2020-10-14 PROCEDURE — 96413 CHEMO IV INFUSION 1 HR: CPT

## 2020-10-14 PROCEDURE — 96375 TX/PRO/DX INJ NEW DRUG ADDON: CPT

## 2020-10-15 ENCOUNTER — HOSPITAL ENCOUNTER (OUTPATIENT)
Dept: MAMMOGRAPHY | Facility: HOSPITAL | Age: 75
Discharge: HOME | End: 2020-10-15
Attending: NURSE PRACTITIONER
Payer: MEDICARE

## 2020-10-15 DIAGNOSIS — Z12.31 ENCOUNTER FOR SCREENING MAMMOGRAM FOR MALIGNANT NEOPLASM OF BREAST: ICD-10-CM

## 2020-10-15 PROCEDURE — 77063 BREAST TOMOSYNTHESIS BI: CPT | Performed by: NURSE PRACTITIONER

## 2020-10-15 PROCEDURE — 77067 SCR MAMMO BI INCL CAD: CPT | Performed by: NURSE PRACTITIONER

## 2020-10-28 ENCOUNTER — OFFICE VISIT (OUTPATIENT)
Dept: HEMATOLOGY/ONCOLOGY | Facility: HOSPITAL | Age: 75
End: 2020-10-28
Attending: INTERNAL MEDICINE
Payer: MEDICARE

## 2020-10-28 VITALS
OXYGEN SATURATION: 98 % | TEMPERATURE: 98 F | HEIGHT: 59.45 IN | BODY MASS INDEX: 34.18 KG/M2 | RESPIRATION RATE: 16 BRPM | WEIGHT: 171.81 LBS | DIASTOLIC BLOOD PRESSURE: 85 MMHG | SYSTOLIC BLOOD PRESSURE: 155 MMHG | HEART RATE: 78 BPM

## 2020-10-28 DIAGNOSIS — T45.1X5A ANEMIA DUE TO ANTINEOPLASTIC CHEMOTHERAPY: ICD-10-CM

## 2020-10-28 DIAGNOSIS — D64.89 ANEMIA DUE TO OTHER CAUSE, NOT CLASSIFIED: ICD-10-CM

## 2020-10-28 DIAGNOSIS — T45.1X5A ANEMIA ASSOCIATED WITH CHEMOTHERAPY: ICD-10-CM

## 2020-10-28 DIAGNOSIS — C24.9 BILIARY TRACT CANCER (HCC): Primary | ICD-10-CM

## 2020-10-28 DIAGNOSIS — D75.89 MACROCYTOSIS: ICD-10-CM

## 2020-10-28 DIAGNOSIS — D64.81 ANEMIA DUE TO ANTINEOPLASTIC CHEMOTHERAPY: ICD-10-CM

## 2020-10-28 DIAGNOSIS — D75.89 MACROCYTOSIS: Primary | ICD-10-CM

## 2020-10-28 DIAGNOSIS — E07.9 DISORDER OF THYROID, UNSPECIFIED: ICD-10-CM

## 2020-10-28 DIAGNOSIS — C54.1 ENDOMETRIAL CANCER (HCC): ICD-10-CM

## 2020-10-28 DIAGNOSIS — I82.431 ACUTE DEEP VEIN THROMBOSIS (DVT) OF RIGHT POPLITEAL VEIN (HCC): ICD-10-CM

## 2020-10-28 DIAGNOSIS — D64.81 ANEMIA ASSOCIATED WITH CHEMOTHERAPY: ICD-10-CM

## 2020-10-28 DIAGNOSIS — C78.7 LIVER METASTASES (HCC): ICD-10-CM

## 2020-10-28 DIAGNOSIS — D70.1 CHEMOTHERAPY-INDUCED NEUTROPENIA (HCC): ICD-10-CM

## 2020-10-28 DIAGNOSIS — G89.3 NEOPLASM RELATED PAIN: ICD-10-CM

## 2020-10-28 DIAGNOSIS — I26.99 PE (PULMONARY THROMBOEMBOLISM) (HCC): ICD-10-CM

## 2020-10-28 DIAGNOSIS — I89.0 LYMPHEDEMA OF BOTH LOWER EXTREMITIES: ICD-10-CM

## 2020-10-28 DIAGNOSIS — T45.1X5A CHEMOTHERAPY-INDUCED NEUTROPENIA (HCC): ICD-10-CM

## 2020-10-28 DIAGNOSIS — R79.89 ELEVATED TSH: ICD-10-CM

## 2020-10-28 DIAGNOSIS — C80.1 PANCREATOBILIARY-TYPE CARCINOMA (HCC): ICD-10-CM

## 2020-10-28 PROCEDURE — 86301 IMMUNOASSAY TUMOR CA 19-9: CPT

## 2020-10-28 PROCEDURE — 85025 COMPLETE CBC W/AUTO DIFF WBC: CPT

## 2020-10-28 PROCEDURE — 84443 ASSAY THYROID STIM HORMONE: CPT

## 2020-10-28 PROCEDURE — 83615 LACTATE (LD) (LDH) ENZYME: CPT

## 2020-10-28 PROCEDURE — 96366 THER/PROPH/DIAG IV INF ADDON: CPT

## 2020-10-28 PROCEDURE — 96361 HYDRATE IV INFUSION ADD-ON: CPT

## 2020-10-28 PROCEDURE — 96375 TX/PRO/DX INJ NEW DRUG ADDON: CPT

## 2020-10-28 PROCEDURE — 96413 CHEMO IV INFUSION 1 HR: CPT

## 2020-10-28 PROCEDURE — 84439 ASSAY OF FREE THYROXINE: CPT

## 2020-10-28 PROCEDURE — 96417 CHEMO IV INFUS EACH ADDL SEQ: CPT

## 2020-10-28 PROCEDURE — 96367 TX/PROPH/DG ADDL SEQ IV INF: CPT

## 2020-10-28 PROCEDURE — 80053 COMPREHEN METABOLIC PANEL: CPT

## 2020-10-28 PROCEDURE — 99215 OFFICE O/P EST HI 40 MIN: CPT | Performed by: INTERNAL MEDICINE

## 2020-10-28 RX ORDER — SODIUM CHLORIDE 9 MG/ML
1000 INJECTION, SOLUTION INTRAVENOUS ONCE
Status: CANCELLED | OUTPATIENT
Start: 2020-10-28

## 2020-10-28 RX ORDER — SODIUM CHLORIDE 9 MG/ML
1000 INJECTION, SOLUTION INTRAVENOUS ONCE
Status: COMPLETED | OUTPATIENT
Start: 2020-10-28 | End: 2020-10-28

## 2020-10-28 RX ADMIN — SODIUM CHLORIDE 1000 ML: 9 INJECTION, SOLUTION INTRAVENOUS at 15:01:00

## 2020-10-28 NOTE — PROGRESS NOTES
Pt here for C6D1 Cisplatin/Gemzar.   Arrives Ambulating independently, accompanied by Self           Patient reports possible pregnancy since last therapy cycle: No    Modifications in dose or schedule: No     Frequency of blood return and site check throug

## 2020-10-28 NOTE — PROGRESS NOTES
[facility] Progress Note    Patient Name: Irineo Stein   YOB: 1945   Medical Record Number: [id number]   CSN: [id number]   Attending Physician: Josue Brewster M.D.    Referring Physician: ENA [address] Dr.    Date o through [employer]:  Somatic - Potentially Actionable Variant Allele Fraction  p.G12V Missense variant (exon 2) - GOF 33.3%  p.C242fs Frameshift - LOF 28.9%  p. E545D Missense variant (exon 9) - GOF 20.9%  Copy number loss  Somatic - Biologically Rel Meclizine HCl 25 MG Oral Tab, Take 1 tablet (25 mg total) by mouth 3 (three) times daily as needed. , Disp: 20 tablet, Rfl: 0  •  Fexofenadine HCl (ALLEGRA) 180 MG Oral Tab, Take 180 mg by mouth daily.   , Disp: , Rfl:   •  CALCIUM + D OR, 1 tablet daily, Jacqueline Graf Gyne History:  OB History    No obstetric history on file.     Psychosocial History:  Social History    Socioeconomic History      Marital status:       Spouse name: Not on file      Number of children: 2      Years of education: Not on file DEXA: declined 11/06, 11/07, 11/08, 10/09, 10/10      Cholesterol: 8/08 (168), 7/09 (138)      Colon: declined 11/06, 11/07, 12/08, 10/09, 10/10      H/O: 10/06 (not returned)            calcium: takes MVI      exercise: walks/ golfs 10/28/2020     Lab Results   Component Value Date     (H) 10/28/2020    BUN 22 (H) 10/28/2020    BUNCREA 19.1 10/28/2020    CREATSERUM 1.15 (H) 10/28/2020    ANIONGAP 6 10/28/2020    GFRNAA 47 (L) 10/28/2020    GFRAA 54 (L) 10/28/2020    CA 8.5 10/2 postinflammatory/infectious in nature, as a calcified granuloma is seen at the right base. Stable metastases are also considered. These measure up to 0.4 cm. No new pulmonary nodules or infiltrates. MEDIASTINUM:  Stable. No adenopathy.   ADE:  Stabl hernia. URINARY BLADDER:  Stable. Nondistended. PELVIC NODES:  Stable. None enlarged. PELVIC ORGANS:  Stable. Surgically absent uterus. BONES:  Stable. Severe degenerative changes, most pronounced lower thoracic discs and lumbar facets.   A few mm a image   26 of series 3. Stable scattered micronodules. MEDIASTINUM:  No mass or adenopathy. ADE:  No mass or adenopathy. CARDIAC:  No enlargement, pericardial thickening, or significant calcification. PLEURA:  Small right pleural effusion.   CHES BLADDER:  No visible focal wall thickening, lesion, or calculus. PELVIC NODES:  No adenopathy. PELVIC ORGANS:  Status post hysterectomy. BONES:  Stable degenerative changes of spine.   No osteolytic or osteoblastic lesion.          =====  CONCLUSION: compression stockings. 4. Abdominal pain- resolved since chemo    5. Thrombocytopenia- felt to be consumptive from previous VTE then developed reactive thrombocytosis from chemo. Back to normal     6. H/o ovarian and endometrial cancer- SEPIDEH.      7. Dec

## 2020-10-30 DIAGNOSIS — R79.89 ELEVATED TSH: Primary | ICD-10-CM

## 2020-10-30 DIAGNOSIS — E07.9 DISORDER OF THYROID, UNSPECIFIED: ICD-10-CM

## 2020-11-03 ENCOUNTER — TELEPHONE (OUTPATIENT)
Dept: HEMATOLOGY/ONCOLOGY | Facility: HOSPITAL | Age: 75
End: 2020-11-03

## 2020-11-03 NOTE — TELEPHONE ENCOUNTER
Biliary tract cancer - C6D1 - 10/28/20- Cisplatin/Gemzar    Nausea - Grade 1  Vomiting - Grade 1 - instructed patient to take antiemetic now, to push fluids (denies lightheadedness or dizziness), small frequent meals, continue with antiemetic today if effe

## 2020-11-04 ENCOUNTER — OFFICE VISIT (OUTPATIENT)
Dept: HEMATOLOGY/ONCOLOGY | Facility: HOSPITAL | Age: 75
End: 2020-11-04
Attending: INTERNAL MEDICINE
Payer: MEDICARE

## 2020-11-04 VITALS
WEIGHT: 164.63 LBS | OXYGEN SATURATION: 97 % | HEIGHT: 59.45 IN | BODY MASS INDEX: 32.75 KG/M2 | TEMPERATURE: 98 F | DIASTOLIC BLOOD PRESSURE: 86 MMHG | HEART RATE: 74 BPM | SYSTOLIC BLOOD PRESSURE: 151 MMHG | RESPIRATION RATE: 18 BRPM

## 2020-11-04 DIAGNOSIS — C24.9 BILIARY TRACT CANCER (HCC): Primary | ICD-10-CM

## 2020-11-04 PROCEDURE — 85025 COMPLETE CBC W/AUTO DIFF WBC: CPT

## 2020-11-04 PROCEDURE — 96375 TX/PRO/DX INJ NEW DRUG ADDON: CPT

## 2020-11-04 PROCEDURE — 96413 CHEMO IV INFUSION 1 HR: CPT

## 2020-11-13 ENCOUNTER — DIETICIAN VISIT (OUTPATIENT)
Dept: HEMATOLOGY/ONCOLOGY | Facility: HOSPITAL | Age: 75
End: 2020-11-13

## 2020-11-13 NOTE — PROGRESS NOTES
Nutrition f/u screen complete as triggered by [facility] dx of diagnosed metastatic biliary tract cancer. Chart reviewed. Pt appears nutritionally stable at present. RD available on consult.

## 2020-11-18 NOTE — PROGRESS NOTES
[facility] Progress Note    Patient Name: Kilo Grace   YOB: 1945   Medical Record Number: [id number]   CSN: [id number]   Attending Physician: Simona Dill M.D.    Referring Physician: MD Dr. Rachelle Kirkland    Date of from aspiration of the cyst showed rare atypical cells.       - started gemcitabine with cisplatin 7/15/20 (as back then was unclear whether had pancreatic primary)      - MSI stable and no  mutation seen through Guardant/  Tempus:  Somatic - Potentia mouth every 6 (six) hours as needed for Nausea., Disp: 30 tablet, Rfl: 3  •  acetaminophen 500 MG Oral Tab, Take 2 tablets (1,000 mg total) by mouth every 8 (eight) hours as needed for Pain., Disp: 60 tablet, Rfl: 0  •  Meclizine HCl 25 MG Oral Tab, Take 1 dx age 55   • Cancer Paternal Grandmother         cancer   • Cancer Paternal Uncle         cancer prostate   • Cancer Paternal Uncle         lung   • Cancer Paternal Uncle         camcer   • Breast Cancer Sister 66       Gyne History:  OB History    No obs Pap: 1/09 KEMI/BSO (GUILLERMO I, Endometriod ca of ovaries)- does see Dr. Nuris Brannon q 3 mos      : 5/10 (5.2),       Mamm: declined 11/06, 11/07, 11/08, 10/09, 10/10      Breast exam: 12/08, 10/09, declined 10/10      DEXA: declined 11/06, 11/07, 11/08, 10/0 Potassium 4.0 3.5 - 5.1 mmol/L    Chloride 106 98 - 112 mmol/L    CO2 26.0 21.0 - 32.0 mmol/L    Anion Gap 6 0 - 18 mmol/L    BUN 22 (H) 7 - 18 mg/dL    Creatinine 1.26 (H) 0.55 - 1.02 mg/dL    BUN/CREA Ratio 17.5 10.0 - 20.0    Calcium, Total 8.8 8.5 - 10 ONLY)(CPT=71260/21610)     COMPARISON:  [facility] , CT, CT CHEST+ABDOMEN+PELVIS(ALL CNTRST ONLY)(CPT=71260/37674), 8/17/2020, 2:12 PM.     INDICATIONS:  C24.9 Malignant neoplasm of biliary tract, unspecified     TECHNIQUE:  IV contrast-enhanced scanning throu lobe.  Large calcified gallstone. PANCREAS:  A circumscribed cystic mass at the pancreatic tail is smaller. 7.2 x 6.0 cm, prior 7.8 x 6.4 cm. The remainder of the pancreas has uniform parenchyma. No ductal dilatation. SPLEEN:  Stable. Not enlarged. [facility] , CT, CT ABDOMEN+PELVIS(CONTRAST ONLY)(CPT=74177), 6/25/2020, 1:55 PM.  [facility] , CT, CT ANGIOGRAPHY, CHEST (CPT=71275), 6/24/2020, 8:39 PM.     INDICATIONS:  C24.9 Malignant neoplasm of biliary tract, unspecified     TECHNIQUE:  IV contrast-enhance other smaller masses which are decreased in size. Stable mild perihepatic ascites anteriorly. BILIARY:  Stable biliary ductal dilatation in segments 2 and 3. Stable large calcified gallstone within gallbladder lumen.     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  Had required further dose reducti We discussed issues of distress, coping difficulties and social support systems and currently there are no active problems.     Taty Aleman MD  [facility]

## 2020-11-25 NOTE — PROGRESS NOTES
Pt here for C7D8 Gemzar.   Arrives Ambulating independently, accompanied by Self           Patient reports possible pregnancy since last therapy cycle: No    Modifications in dose or schedule: Yes dose reduction due to [address] of [address]     Frequency of blood retu

## 2020-12-09 NOTE — PROGRESS NOTES
[facility] Progress Note    Patient Name: Desirae Sky   YOB: 1945   Medical Record Number: [id number]   CSN: [id number]   Attending Physician: Jameel Whiteside M.D.    Referring Physician: MD Dr. Adam Gorman    Date of primary. CA 19.9 was >154,000. Cytology from aspiration of the cyst showed rare atypical cells.       - started gemcitabine with cisplatin 7/15/20 (as back then was unclear whether had pancreatic primary)      - MSI stable and no  mutation seen throug 3  •  Prochlorperazine Maleate (COMPAZINE) 10 mg tablet, Take 1 tablet (10 mg total) by mouth every 6 (six) hours as needed for Nausea., Disp: 30 tablet, Rfl: 3  •  acetaminophen 500 MG Oral Tab, Take 2 tablets (1,000 mg total) by mouth every 8 (eight) geovanna (leukemia) Father          age [de-identified]   • Breast Cancer Cousin 54        dx age 54   • Cancer Paternal Grandmother         cancer   • Cancer Paternal Uncle         cancer prostate   • Cancer Paternal Uncle         lung   • Cancer Paternal Uncle         cam Concerns:        Not on file    Social History Narrative      Health Mnt:      Pap: 1/09 KEMI/BSO (GUILLERMO I, Endometriod ca of ovaries)- does see Dr. Jerry Brizuela q 3 mos      : 5/10 (5.2),       Mamm: declined 11/06, 11/07, 11/08, 10/09, 10/10      Breast exa 24.0 21.0 - 32.0 mmol/L    Anion Gap 6 0 - 18 mmol/L    BUN 28 (H) 7 - 18 mg/dL    Creatinine 1.42 (H) 0.55 - 1.02 mg/dL    BUN/CREA Ratio 19.7 10.0 - 20.0    Calcium, Total 8.7 8.5 - 10.1 mg/dL    Calculated Osmolality 290 275 - 295 mOsm/kg    GFR, Non-Af TECHNIQUE:  IV contrast-enhanced scanning through the chest, abdomen, and pelvis was performed. Dose reduction techniques were used.  Dose information is transmitted to the [facility] ([facility] [employer]) Meryl Ricardo [address] ([address]) which ductal dilatation. SPLEEN:  Stable. Not enlarged. An area of heterogeneity is noted. Calcifications are present consistent with prior granulomatous disease. Small focal low-attenuation nodule measuring 1.2 cm. KIDNEYS:  Stable.   Normal anatomic po tract, unspecified     TECHNIQUE:  IV contrast-enhanced scanning through the chest, abdomen, and pelvis was performed. Dose reduction techniques were used.  Dose information is transmitted to the [facility] ([facility] [employer]) Meryl Ricardo [address] [place] [address] Radiology gallbladder lumen. PANCREAS:  Large low-attenuation mass along the anterior margin of the pancreatic tail measures 7.8 x 6.4 cm, previously 8.7 x 5.4 cm. Craniocaudal measurement is 6.8 cm, previously 7.9 cm by my measurements.   SPLEEN:  Evolved infarc cycles of cis/gem. Had required further dose reduction of doses due to cytopenias. Having more toxicity from cisplatin (creatinine increasing, ototoxicity). Last CT showed response with no new lesions seen.  With more recent CTs, radiology felt that there w Group

## 2020-12-09 NOTE — PROGRESS NOTES
Pt here for C8D1.   Arrives Ambulating independently, accompanied by Self           Patient reports possible pregnancy since last therapy cycle: No    Modifications in dose or schedule: No     Frequency of blood return and site check throughout administrati

## 2020-12-16 NOTE — PROGRESS NOTES
Pt here for C8D8 of Gemzar.   Arrives Ambulating independently, accompanied by Self           Patient reports possible pregnancy since last therapy cycle: No    Modifications in dose or schedule: Yes     Frequency of blood return and site check throughout a

## 2020-12-29 PROBLEM — C80.1 PANCREATOBILIARY-TYPE CARCINOMA (HCC): Status: ACTIVE | Noted: 2020-01-01

## 2020-12-30 NOTE — PROGRESS NOTES
Education Record    Learner:  Patient    Disease / Diagnosis: pancreatobiliary cancer    Barriers / Limitations:  None   Comments:    Method:  Discussion   Comments:    General Topics:  Plan of care reviewed   Comments:    Outcome:  Shows understanding   C

## 2020-12-30 NOTE — PROGRESS NOTES
[facility] Progress Note    Patient Name: Danielle Martínez   YOB: 1945   Medical Record Number: [id number]   CSN: [id number]   Attending Physician: Ryan Enriquez M.D.    Referring Physician: Dania Hale MD      Date of Visit: 12/30/2020 aspiration of the cyst showed rare atypical cells.       - started gemcitabine with cisplatin 7/15/20 (as back then was unclear whether had pancreatic primary)      - MSI stable and no  mutation seen through Guardant/  Tempus:  Somatic - Potentially A every 8 (eight) hours as needed for Nausea., Disp: 30 tablet, Rfl: 3  •  Prochlorperazine Maleate (COMPAZINE) 10 mg tablet, Take 1 tablet (10 mg total) by mouth every 6 (six) hours as needed for Nausea., Disp: 30 tablet, Rfl: 3  •  acetaminophen 500 MG [place] Breast Cancer Cousin 54        dx age 54   • Cancer Paternal Grandmother         cancer   • Cancer Paternal Uncle         cancer prostate   • Cancer Paternal Uncle         lung   • Cancer Paternal Uncle         camcer   • Breast Cancer Sister 66       Gyne History Narrative      Health Mnt:      Pap: 1/09 KEMI/BSO (GUILLERMO I, Endometriod ca of ovaries)- does see Dr. Artie Mckeon q 3 mos      : 5/10 (5.2),       Mamm: declined 11/06, 11/07, 11/08, 10/09, 10/10      Breast exam: 12/08, 10/09, declined 10/10      DE - 18 mmol/L    BUN 26 (H) 7 - 18 mg/dL    Creatinine 1.33 (H) 0.55 - 1.02 mg/dL    BUN/CREA Ratio 19.5 10.0 - 20.0    Calcium, Total 8.7 8.5 - 10.1 mg/dL    Calculated Osmolality 289 275 - 295 mOsm/kg    GFR, Non- 39 (L) >=60    GFR, Story techniques were used. Dose information is transmitted to the ACR [employer] of Radiology) NRDR ([address]) which   includes the Dose Index Registry.      PATIENT STATED HISTORY:(As transcribed by Technologist)  Patient has hist previously   measured (72 x 60 mm). SPLEEN:  New hypo enhancing lesion within the superior spleen measures 22 x 15 mm could represent a metastatic lesion. There is a stable cystic lesion within the spleen measuring 11 mm.    KIDNEYS:  No mass, obstruction ONLY)(CPT=71260/60748)     COMPARISON:  [facility] , CT, CT CHEST+ABDOMEN+PELVIS(ALL CNTRST ONLY)(CPT=71260/68264), 8/17/2020, 2:12 PM.     INDICATIONS:  C24.9 Malignant neoplasm of biliary tract, unspecified     TECHNIQUE:  IV contrast-enhanced scanning throu lobe.  Large calcified gallstone. PANCREAS:  A circumscribed cystic mass at the pancreatic tail is smaller. 7.2 x 6.0 cm, prior 7.8 x 6.4 cm. The remainder of the pancreas has uniform parenchyma. No ductal dilatation. SPLEEN:  Stable. Not enlarged. Having more toxicity from cisplatin (creatinine increasing, ototoxicity). With more recent CTs, radiology felt that there was a lesion in the pancreatic tail (likely primary) vs before was described as possible metastatic focus extending into the tail.  Reg therapy with abnormal CT.      Jeaen Valdivia MD  [facility] [place] [facility]

## 2020-12-30 NOTE — IMAGING NOTE
Call to pt, verified name and . Pt states she has spoken with her doctor managing her xarelto, and will be holding xarelto for 48 hours prior to procedure,  She denies taking any other blood thinners.

## 2021-01-01 ENCOUNTER — TELEPHONE (OUTPATIENT)
Dept: HEMATOLOGY/ONCOLOGY | Facility: HOSPITAL | Age: 76
End: 2021-01-01

## 2021-01-01 ENCOUNTER — SNF VISIT (OUTPATIENT)
Dept: INTERNAL MEDICINE CLINIC | Age: 76
End: 2021-01-01

## 2021-01-01 ENCOUNTER — OFFICE VISIT (OUTPATIENT)
Dept: HEMATOLOGY/ONCOLOGY | Facility: HOSPITAL | Age: 76
End: 2021-01-01
Attending: INTERNAL MEDICINE
Payer: MEDICARE

## 2021-01-01 ENCOUNTER — HOSPITAL ENCOUNTER (OUTPATIENT)
Dept: ULTRASOUND IMAGING | Facility: HOSPITAL | Age: 76
Discharge: HOME | End: 2021-01-01
Attending: INTERNAL MEDICINE
Payer: MEDICARE

## 2021-01-01 ENCOUNTER — HOSPITAL ENCOUNTER (OUTPATIENT)
Dept: ULTRASOUND IMAGING | Age: 76
Discharge: HOME | End: 2021-01-01
Attending: INTERNAL MEDICINE
Payer: MEDICARE

## 2021-01-01 ENCOUNTER — HOSPITAL ENCOUNTER (INPATIENT)
Facility: HOSPITAL | Age: 76
LOS: 3 days | DRG: 435 | End: 2021-01-01
Attending: INTERNAL MEDICINE | Admitting: INTERNAL MEDICINE
Payer: OTHER MISCELLANEOUS

## 2021-01-01 ENCOUNTER — HOSPITAL ENCOUNTER (INPATIENT)
Facility: HOSPITAL | Age: 76
LOS: 5 days | Discharge: NURSING, REHAB OR LONG-TERM CARE | DRG: 372 | End: 2021-01-01
Attending: EMERGENCY MEDICINE | Admitting: HOSPITALIST
Payer: MEDICARE

## 2021-01-01 ENCOUNTER — HOSPITAL ENCOUNTER (OUTPATIENT)
Dept: MRI IMAGING | Age: 76
Discharge: HOME | DRG: 372 | End: 2021-01-01
Attending: INTERNAL MEDICINE
Payer: MEDICARE

## 2021-01-01 ENCOUNTER — IMMUNIZATION (OUTPATIENT)
Dept: LAB | Age: 76
End: 2021-01-01
Attending: HOSPITALIST
Payer: MEDICARE

## 2021-01-01 ENCOUNTER — LAB ENCOUNTER (OUTPATIENT)
Dept: LAB | Facility: HOSPITAL | Age: 76
End: 2021-01-01
Attending: INTERNAL MEDICINE
Payer: MEDICARE

## 2021-01-01 ENCOUNTER — INITIAL APN SNF VISIT (OUTPATIENT)
Dept: INTERNAL MEDICINE CLINIC | Age: 76
End: 2021-01-01

## 2021-01-01 ENCOUNTER — DIETICIAN VISIT (OUTPATIENT)
Dept: HEMATOLOGY/ONCOLOGY | Facility: HOSPITAL | Age: 76
End: 2021-01-01

## 2021-01-01 ENCOUNTER — APPOINTMENT (OUTPATIENT)
Dept: ULTRASOUND IMAGING | Facility: HOSPITAL | Age: 76
DRG: 372 | End: 2021-01-01
Attending: INTERNAL MEDICINE
Payer: MEDICARE

## 2021-01-01 ENCOUNTER — APPOINTMENT (OUTPATIENT)
Dept: ULTRASOUND IMAGING | Facility: HOSPITAL | Age: 76
DRG: 372 | End: 2021-01-01
Attending: STUDENT IN AN ORGANIZED HEALTH CARE EDUCATION/TRAINING PROGRAM
Payer: MEDICARE

## 2021-01-01 ENCOUNTER — HOSPITAL ENCOUNTER (OUTPATIENT)
Dept: CT IMAGING | Facility: HOSPITAL | Age: 76
Discharge: HOME | End: 2021-01-01
Attending: CLINICAL NURSE SPECIALIST
Payer: MEDICARE

## 2021-01-01 ENCOUNTER — APPOINTMENT (OUTPATIENT)
Dept: CT IMAGING | Facility: HOSPITAL | Age: 76
DRG: 435 | End: 2021-01-01
Attending: EMERGENCY MEDICINE
Payer: MEDICARE

## 2021-01-01 ENCOUNTER — HOSPITAL ENCOUNTER (INPATIENT)
Facility: HOSPITAL | Age: 76
LOS: 1 days | Discharge: HOSPICE | DRG: 435 | End: 2021-01-01
Attending: EMERGENCY MEDICINE | Admitting: HOSPITALIST
Payer: MEDICARE

## 2021-01-01 ENCOUNTER — HOSPITAL ENCOUNTER (OUTPATIENT)
Dept: CT IMAGING | Facility: HOSPITAL | Age: 76
Discharge: HOME | End: 2021-01-01
Attending: INTERNAL MEDICINE
Payer: MEDICARE

## 2021-01-01 ENCOUNTER — SOCIAL WORK SERVICES (OUTPATIENT)
Dept: HEMATOLOGY/ONCOLOGY | Facility: HOSPITAL | Age: 76
End: 2021-01-01

## 2021-01-01 ENCOUNTER — APPOINTMENT (OUTPATIENT)
Dept: GENERAL RADIOLOGY | Facility: HOSPITAL | Age: 76
DRG: 435 | End: 2021-01-01
Attending: EMERGENCY MEDICINE
Payer: MEDICARE

## 2021-01-01 ENCOUNTER — APPOINTMENT (OUTPATIENT)
Dept: GENERAL RADIOLOGY | Facility: HOSPITAL | Age: 76
DRG: 372 | End: 2021-01-01
Attending: EMERGENCY MEDICINE
Payer: MEDICARE

## 2021-01-01 ENCOUNTER — APPOINTMENT (OUTPATIENT)
Dept: GENERAL RADIOLOGY | Facility: HOSPITAL | Age: 76
DRG: 372 | End: 2021-01-01
Attending: INTERNAL MEDICINE
Payer: MEDICARE

## 2021-01-01 ENCOUNTER — HOSPITAL ENCOUNTER (OUTPATIENT)
Dept: GENERAL RADIOLOGY | Facility: HOSPITAL | Age: 76
Discharge: HOME | End: 2021-01-01
Attending: INTERNAL MEDICINE
Payer: MEDICARE

## 2021-01-01 ENCOUNTER — DOCUMENTATION ONLY (OUTPATIENT)
Dept: HEMATOLOGY/ONCOLOGY | Facility: HOSPITAL | Age: 76
End: 2021-01-01

## 2021-01-01 VITALS
BODY MASS INDEX: 31.78 KG/M2 | TEMPERATURE: 100 F | HEART RATE: 78 BPM | WEIGHT: 164 LBS | SYSTOLIC BLOOD PRESSURE: 114 MMHG | HEIGHT: 60.39 IN | OXYGEN SATURATION: 96 % | DIASTOLIC BLOOD PRESSURE: 61 MMHG | RESPIRATION RATE: 16 BRPM

## 2021-01-01 VITALS
OXYGEN SATURATION: 99 % | BODY MASS INDEX: 31.1 KG/M2 | DIASTOLIC BLOOD PRESSURE: 72 MMHG | SYSTOLIC BLOOD PRESSURE: 123 MMHG | OXYGEN SATURATION: 96 % | RESPIRATION RATE: 16 BRPM | SYSTOLIC BLOOD PRESSURE: 120 MMHG | HEIGHT: 60.39 IN | HEART RATE: 69 BPM | DIASTOLIC BLOOD PRESSURE: 54 MMHG | WEIGHT: 160.5 LBS | TEMPERATURE: 98 F | TEMPERATURE: 98 F | HEART RATE: 68 BPM | WEIGHT: 162 LBS | HEIGHT: 60.39 IN | RESPIRATION RATE: 16 BRPM | BODY MASS INDEX: 31.39 KG/M2

## 2021-01-01 VITALS
SYSTOLIC BLOOD PRESSURE: 150 MMHG | RESPIRATION RATE: 18 BRPM | OXYGEN SATURATION: 97 % | WEIGHT: 163.19 LBS | BODY MASS INDEX: 31.62 KG/M2 | HEART RATE: 75 BPM | HEIGHT: 60.24 IN | DIASTOLIC BLOOD PRESSURE: 80 MMHG | TEMPERATURE: 97 F

## 2021-01-01 VITALS
HEIGHT: 60.39 IN | DIASTOLIC BLOOD PRESSURE: 69 MMHG | OXYGEN SATURATION: 97 % | WEIGHT: 162.38 LBS | BODY MASS INDEX: 31.46 KG/M2 | TEMPERATURE: 98 F | HEART RATE: 81 BPM | RESPIRATION RATE: 16 BRPM | SYSTOLIC BLOOD PRESSURE: 112 MMHG

## 2021-01-01 VITALS
TEMPERATURE: 98 F | WEIGHT: 160.38 LBS | OXYGEN SATURATION: 96 % | HEIGHT: 60.39 IN | HEART RATE: 85 BPM | RESPIRATION RATE: 16 BRPM | SYSTOLIC BLOOD PRESSURE: 127 MMHG | DIASTOLIC BLOOD PRESSURE: 67 MMHG | BODY MASS INDEX: 31.08 KG/M2

## 2021-01-01 VITALS
BODY MASS INDEX: 32.29 KG/M2 | HEART RATE: 81 BPM | DIASTOLIC BLOOD PRESSURE: 93 MMHG | WEIGHT: 166.63 LBS | RESPIRATION RATE: 18 BRPM | HEIGHT: 60.39 IN | TEMPERATURE: 98 F | SYSTOLIC BLOOD PRESSURE: 151 MMHG | OXYGEN SATURATION: 95 %

## 2021-01-01 VITALS
RESPIRATION RATE: 16 BRPM | OXYGEN SATURATION: 97 % | HEART RATE: 77 BPM | TEMPERATURE: 98 F | SYSTOLIC BLOOD PRESSURE: 96 MMHG | DIASTOLIC BLOOD PRESSURE: 60 MMHG | HEIGHT: 60.39 IN | BODY MASS INDEX: 32.36 KG/M2 | WEIGHT: 167 LBS

## 2021-01-01 VITALS — HEART RATE: 69 BPM | SYSTOLIC BLOOD PRESSURE: 122 MMHG | DIASTOLIC BLOOD PRESSURE: 69 MMHG | OXYGEN SATURATION: 98 %

## 2021-01-01 VITALS
TEMPERATURE: 100 F | HEIGHT: 60.39 IN | HEART RATE: 74 BPM | SYSTOLIC BLOOD PRESSURE: 129 MMHG | DIASTOLIC BLOOD PRESSURE: 49 MMHG | WEIGHT: 165 LBS | OXYGEN SATURATION: 98 % | RESPIRATION RATE: 18 BRPM | BODY MASS INDEX: 31.97 KG/M2

## 2021-01-01 VITALS
OXYGEN SATURATION: 94 % | HEART RATE: 82 BPM | TEMPERATURE: 98 F | SYSTOLIC BLOOD PRESSURE: 114 MMHG | RESPIRATION RATE: 16 BRPM | DIASTOLIC BLOOD PRESSURE: 58 MMHG

## 2021-01-01 VITALS
DIASTOLIC BLOOD PRESSURE: 75 MMHG | HEIGHT: 60.24 IN | WEIGHT: 164.5 LBS | HEART RATE: 83 BPM | OXYGEN SATURATION: 98 % | RESPIRATION RATE: 16 BRPM | BODY MASS INDEX: 31.88 KG/M2 | SYSTOLIC BLOOD PRESSURE: 139 MMHG | TEMPERATURE: 96 F

## 2021-01-01 VITALS
SYSTOLIC BLOOD PRESSURE: 105 MMHG | DIASTOLIC BLOOD PRESSURE: 67 MMHG | WEIGHT: 159 LBS | HEART RATE: 77 BPM | HEIGHT: 60.39 IN | OXYGEN SATURATION: 97 % | HEART RATE: 72 BPM | TEMPERATURE: 98 F | RESPIRATION RATE: 16 BRPM | BODY MASS INDEX: 30.81 KG/M2 | RESPIRATION RATE: 16 BRPM | SYSTOLIC BLOOD PRESSURE: 111 MMHG | WEIGHT: 159 LBS | HEIGHT: 60.39 IN | DIASTOLIC BLOOD PRESSURE: 54 MMHG | BODY MASS INDEX: 30.81 KG/M2 | TEMPERATURE: 97 F | OXYGEN SATURATION: 99 %

## 2021-01-01 VITALS
HEIGHT: 60.39 IN | SYSTOLIC BLOOD PRESSURE: 115 MMHG | BODY MASS INDEX: 32.09 KG/M2 | RESPIRATION RATE: 18 BRPM | HEART RATE: 79 BPM | OXYGEN SATURATION: 97 % | TEMPERATURE: 99 F | WEIGHT: 165.63 LBS | DIASTOLIC BLOOD PRESSURE: 55 MMHG

## 2021-01-01 VITALS
TEMPERATURE: 100 F | OXYGEN SATURATION: 88 % | BODY MASS INDEX: 35 KG/M2 | HEART RATE: 92 BPM | DIASTOLIC BLOOD PRESSURE: 32 MMHG | SYSTOLIC BLOOD PRESSURE: 81 MMHG | RESPIRATION RATE: 18 BRPM | WEIGHT: 178.31 LBS

## 2021-01-01 VITALS
SYSTOLIC BLOOD PRESSURE: 132 MMHG | HEART RATE: 74 BPM | RESPIRATION RATE: 16 BRPM | OXYGEN SATURATION: 95 % | TEMPERATURE: 98 F | WEIGHT: 162 LBS | BODY MASS INDEX: 31.39 KG/M2 | HEIGHT: 60.24 IN | DIASTOLIC BLOOD PRESSURE: 73 MMHG

## 2021-01-01 VITALS
DIASTOLIC BLOOD PRESSURE: 64 MMHG | HEART RATE: 75 BPM | TEMPERATURE: 99 F | OXYGEN SATURATION: 98 % | SYSTOLIC BLOOD PRESSURE: 99 MMHG | RESPIRATION RATE: 18 BRPM

## 2021-01-01 VITALS
OXYGEN SATURATION: 99 % | DIASTOLIC BLOOD PRESSURE: 47 MMHG | BODY MASS INDEX: 30.21 KG/M2 | RESPIRATION RATE: 18 BRPM | HEART RATE: 82 BPM | SYSTOLIC BLOOD PRESSURE: 101 MMHG | TEMPERATURE: 98 F | WEIGHT: 160 LBS | HEIGHT: 61 IN

## 2021-01-01 VITALS
DIASTOLIC BLOOD PRESSURE: 73 MMHG | WEIGHT: 161.63 LBS | HEIGHT: 60.24 IN | RESPIRATION RATE: 18 BRPM | SYSTOLIC BLOOD PRESSURE: 110 MMHG | BODY MASS INDEX: 31.32 KG/M2 | TEMPERATURE: 97 F | HEART RATE: 71 BPM | OXYGEN SATURATION: 100 %

## 2021-01-01 VITALS
TEMPERATURE: 99 F | DIASTOLIC BLOOD PRESSURE: 62 MMHG | HEART RATE: 78 BPM | SYSTOLIC BLOOD PRESSURE: 126 MMHG | BODY MASS INDEX: 30.81 KG/M2 | OXYGEN SATURATION: 99 % | RESPIRATION RATE: 16 BRPM | HEIGHT: 60.39 IN | WEIGHT: 159 LBS

## 2021-01-01 VITALS
DIASTOLIC BLOOD PRESSURE: 62 MMHG | SYSTOLIC BLOOD PRESSURE: 103 MMHG | WEIGHT: 159 LBS | HEART RATE: 75 BPM | TEMPERATURE: 99 F | RESPIRATION RATE: 16 BRPM | BODY MASS INDEX: 30.81 KG/M2 | OXYGEN SATURATION: 96 % | HEIGHT: 60.39 IN

## 2021-01-01 VITALS
HEIGHT: 60 IN | RESPIRATION RATE: 18 BRPM | OXYGEN SATURATION: 97 % | DIASTOLIC BLOOD PRESSURE: 61 MMHG | TEMPERATURE: 100 F | BODY MASS INDEX: 32 KG/M2 | SYSTOLIC BLOOD PRESSURE: 107 MMHG | WEIGHT: 163 LBS | HEART RATE: 89 BPM

## 2021-01-01 VITALS
DIASTOLIC BLOOD PRESSURE: 56 MMHG | OXYGEN SATURATION: 99 % | TEMPERATURE: 99 F | HEIGHT: 59.5 IN | HEART RATE: 68 BPM | WEIGHT: 164 LBS | SYSTOLIC BLOOD PRESSURE: 108 MMHG | BODY MASS INDEX: 32.63 KG/M2 | RESPIRATION RATE: 18 BRPM

## 2021-01-01 VITALS
HEIGHT: 60.39 IN | SYSTOLIC BLOOD PRESSURE: 128 MMHG | OXYGEN SATURATION: 97 % | DIASTOLIC BLOOD PRESSURE: 76 MMHG | BODY MASS INDEX: 31.2 KG/M2 | WEIGHT: 161 LBS | RESPIRATION RATE: 16 BRPM | TEMPERATURE: 98 F | HEART RATE: 72 BPM

## 2021-01-01 VITALS
WEIGHT: 179.63 LBS | HEART RATE: 92 BPM | RESPIRATION RATE: 20 BRPM | SYSTOLIC BLOOD PRESSURE: 120 MMHG | DIASTOLIC BLOOD PRESSURE: 63 MMHG | BODY MASS INDEX: 35 KG/M2 | TEMPERATURE: 99 F | OXYGEN SATURATION: 97 %

## 2021-01-01 VITALS
HEIGHT: 60.39 IN | TEMPERATURE: 99 F | DIASTOLIC BLOOD PRESSURE: 72 MMHG | HEART RATE: 65 BPM | RESPIRATION RATE: 17 BRPM | SYSTOLIC BLOOD PRESSURE: 124 MMHG | BODY MASS INDEX: 31.32 KG/M2 | WEIGHT: 161.63 LBS | OXYGEN SATURATION: 99 %

## 2021-01-01 VITALS
TEMPERATURE: 97 F | DIASTOLIC BLOOD PRESSURE: 53 MMHG | SYSTOLIC BLOOD PRESSURE: 108 MMHG | HEART RATE: 87 BPM | OXYGEN SATURATION: 94 % | RESPIRATION RATE: 16 BRPM

## 2021-01-01 VITALS
SYSTOLIC BLOOD PRESSURE: 145 MMHG | HEIGHT: 60.39 IN | OXYGEN SATURATION: 96 % | RESPIRATION RATE: 16 BRPM | DIASTOLIC BLOOD PRESSURE: 73 MMHG | BODY MASS INDEX: 30.81 KG/M2 | HEART RATE: 69 BPM | WEIGHT: 159 LBS | TEMPERATURE: 98 F

## 2021-01-01 VITALS
TEMPERATURE: 97 F | RESPIRATION RATE: 16 BRPM | HEIGHT: 60.39 IN | DIASTOLIC BLOOD PRESSURE: 54 MMHG | SYSTOLIC BLOOD PRESSURE: 105 MMHG | WEIGHT: 169 LBS | OXYGEN SATURATION: 94 % | HEART RATE: 101 BPM | BODY MASS INDEX: 32.75 KG/M2

## 2021-01-01 VITALS
WEIGHT: 159 LBS | HEIGHT: 60.39 IN | OXYGEN SATURATION: 96 % | RESPIRATION RATE: 16 BRPM | DIASTOLIC BLOOD PRESSURE: 54 MMHG | HEART RATE: 82 BPM | TEMPERATURE: 98 F | BODY MASS INDEX: 30.81 KG/M2 | SYSTOLIC BLOOD PRESSURE: 115 MMHG

## 2021-01-01 VITALS — HEART RATE: 87 BPM | OXYGEN SATURATION: 94 %

## 2021-01-01 VITALS — DIASTOLIC BLOOD PRESSURE: 71 MMHG | SYSTOLIC BLOOD PRESSURE: 145 MMHG

## 2021-01-01 DIAGNOSIS — C54.1 ENDOMETRIAL CANCER (HCC): ICD-10-CM

## 2021-01-01 DIAGNOSIS — D63.8 ANEMIA IN OTHER CHRONIC DISEASES CLASSIFIED ELSEWHERE: ICD-10-CM

## 2021-01-01 DIAGNOSIS — C24.9 BILIARY TRACT CANCER (HCC): Primary | ICD-10-CM

## 2021-01-01 DIAGNOSIS — C78.7 LIVER METASTASES (HCC): ICD-10-CM

## 2021-01-01 DIAGNOSIS — H93.13 TINNITUS OF BOTH EARS: ICD-10-CM

## 2021-01-01 DIAGNOSIS — D75.89 MACROCYTOSIS: ICD-10-CM

## 2021-01-01 DIAGNOSIS — R10.9 LEFT FLANK PAIN: ICD-10-CM

## 2021-01-01 DIAGNOSIS — G62.0 NEUROPATHY DUE TO CHEMOTHERAPEUTIC DRUG (HCC): ICD-10-CM

## 2021-01-01 DIAGNOSIS — T45.1X5A ANEMIA ASSOCIATED WITH CHEMOTHERAPY: ICD-10-CM

## 2021-01-01 DIAGNOSIS — D61.810 PANCYTOPENIA DUE TO CHEMOTHERAPY (HCC): ICD-10-CM

## 2021-01-01 DIAGNOSIS — G89.3 NEOPLASM RELATED PAIN: ICD-10-CM

## 2021-01-01 DIAGNOSIS — Z85.42 HISTORY OF ENDOMETRIAL CANCER: ICD-10-CM

## 2021-01-01 DIAGNOSIS — R53.1 WEAKNESS: ICD-10-CM

## 2021-01-01 DIAGNOSIS — D50.0 IRON DEFICIENCY ANEMIA DUE TO CHRONIC BLOOD LOSS: ICD-10-CM

## 2021-01-01 DIAGNOSIS — I89.0 LYMPHEDEMA OF BOTH LOWER EXTREMITIES: ICD-10-CM

## 2021-01-01 DIAGNOSIS — I82.431 ACUTE DEEP VEIN THROMBOSIS (DVT) OF RIGHT POPLITEAL VEIN (HCC): ICD-10-CM

## 2021-01-01 DIAGNOSIS — R53.1 WEAKNESS: Primary | ICD-10-CM

## 2021-01-01 DIAGNOSIS — Z86.711 HISTORY OF PULMONARY EMBOLISM: ICD-10-CM

## 2021-01-01 DIAGNOSIS — I89.0 LYMPHEDEMA OF BOTH LOWER EXTREMITIES: Primary | ICD-10-CM

## 2021-01-01 DIAGNOSIS — C80.1 PANCREATOBILIARY-TYPE CARCINOMA (HCC): ICD-10-CM

## 2021-01-01 DIAGNOSIS — D64.81 ANEMIA ASSOCIATED WITH CHEMOTHERAPY: ICD-10-CM

## 2021-01-01 DIAGNOSIS — T45.1X5A NEUROPATHY DUE TO CHEMOTHERAPEUTIC DRUG (HCC): ICD-10-CM

## 2021-01-01 DIAGNOSIS — R18.0 MALIGNANT ASCITES: ICD-10-CM

## 2021-01-01 DIAGNOSIS — T45.1X5A PERIPHERAL NEUROPATHY DUE TO CHEMOTHERAPY (HCC): ICD-10-CM

## 2021-01-01 DIAGNOSIS — E04.2 MULTIPLE THYROID NODULES: ICD-10-CM

## 2021-01-01 DIAGNOSIS — Z51.11 ENCOUNTER FOR CHEMOTHERAPY MANAGEMENT: ICD-10-CM

## 2021-01-01 DIAGNOSIS — C80.1 PANCREATOBILIARY-TYPE CARCINOMA (HCC): Primary | ICD-10-CM

## 2021-01-01 DIAGNOSIS — I26.99 PULMONARY EMBOLISM AND INFARCTION (HCC): ICD-10-CM

## 2021-01-01 DIAGNOSIS — D72.829 LEUKOCYTOSIS, UNSPECIFIED TYPE: Primary | ICD-10-CM

## 2021-01-01 DIAGNOSIS — M54.50 CHRONIC MIDLINE LOW BACK PAIN WITHOUT SCIATICA: ICD-10-CM

## 2021-01-01 DIAGNOSIS — Z23 NEED FOR VACCINATION: Primary | ICD-10-CM

## 2021-01-01 DIAGNOSIS — D64.81 ANEMIA DUE TO ANTINEOPLASTIC CHEMOTHERAPY: ICD-10-CM

## 2021-01-01 DIAGNOSIS — K52.1 DIARRHEA DUE TO DRUG: ICD-10-CM

## 2021-01-01 DIAGNOSIS — G62.0 PERIPHERAL NEUROPATHY DUE TO CHEMOTHERAPY (HCC): ICD-10-CM

## 2021-01-01 DIAGNOSIS — C25.2 MALIGNANT NEOPLASM OF TAIL OF PANCREAS (HCC): Primary | ICD-10-CM

## 2021-01-01 DIAGNOSIS — G89.29 CHRONIC MIDLINE LOW BACK PAIN WITHOUT SCIATICA: ICD-10-CM

## 2021-01-01 DIAGNOSIS — N17.9 AKI (ACUTE KIDNEY INJURY) (HCC): ICD-10-CM

## 2021-01-01 DIAGNOSIS — Z79.01 CHRONIC ANTICOAGULATION: ICD-10-CM

## 2021-01-01 DIAGNOSIS — I26.99 PE (PULMONARY THROMBOEMBOLISM) (HCC): ICD-10-CM

## 2021-01-01 DIAGNOSIS — T45.1X5A ANEMIA DUE TO ANTINEOPLASTIC CHEMOTHERAPY: ICD-10-CM

## 2021-01-01 DIAGNOSIS — C25.1 MALIGNANT NEOPLASM OF BODY OF PANCREAS (HCC): ICD-10-CM

## 2021-01-01 DIAGNOSIS — R26.89 KEEPS LOSING BALANCE: ICD-10-CM

## 2021-01-01 DIAGNOSIS — K65.2 SPONTANEOUS BACTERIAL PERITONITIS (HCC): ICD-10-CM

## 2021-01-01 DIAGNOSIS — K59.00 CONSTIPATION, UNSPECIFIED CONSTIPATION TYPE: ICD-10-CM

## 2021-01-01 DIAGNOSIS — Z86.718 HISTORY OF DVT (DEEP VEIN THROMBOSIS): ICD-10-CM

## 2021-01-01 DIAGNOSIS — C78.7 LIVER METASTASES (HCC): Primary | ICD-10-CM

## 2021-01-01 DIAGNOSIS — D61.818 PANCYTOPENIA (HCC): ICD-10-CM

## 2021-01-01 DIAGNOSIS — G62.89 OTHER POLYNEUROPATHY: ICD-10-CM

## 2021-01-01 DIAGNOSIS — B37.0 ORAL CANDIDIASIS: ICD-10-CM

## 2021-01-01 DIAGNOSIS — Z78.9 IMPAIRED MOBILITY AND ADLS: ICD-10-CM

## 2021-01-01 DIAGNOSIS — C25.2 MALIGNANT NEOPLASM OF TAIL OF PANCREAS (HCC): ICD-10-CM

## 2021-01-01 DIAGNOSIS — Z51.11 ENCOUNTER FOR CHEMOTHERAPY MANAGEMENT: Primary | ICD-10-CM

## 2021-01-01 DIAGNOSIS — R10.9 LEFT FLANK PAIN: Primary | ICD-10-CM

## 2021-01-01 DIAGNOSIS — Z23 NEED FOR VACCINATION: ICD-10-CM

## 2021-01-01 DIAGNOSIS — E86.0 DEHYDRATION: ICD-10-CM

## 2021-01-01 DIAGNOSIS — D63.0 ANEMIA COMPLICATING NEOPLASTIC DISEASE: ICD-10-CM

## 2021-01-01 DIAGNOSIS — D72.829 LEUKOCYTOSIS, UNSPECIFIED TYPE: ICD-10-CM

## 2021-01-01 DIAGNOSIS — C24.9 BILIARY TRACT CANCER (HCC): ICD-10-CM

## 2021-01-01 DIAGNOSIS — I95.1 ORTHOSTASIS: ICD-10-CM

## 2021-01-01 DIAGNOSIS — E78.00 PURE HYPERCHOLESTEROLEMIA: ICD-10-CM

## 2021-01-01 DIAGNOSIS — C80.1 ADENOCARCINOMA (HCC): ICD-10-CM

## 2021-01-01 DIAGNOSIS — E04.2 MULTIPLE THYROID NODULES: Primary | ICD-10-CM

## 2021-01-01 DIAGNOSIS — E83.52 HYPERCALCEMIA: ICD-10-CM

## 2021-01-01 DIAGNOSIS — Z74.09 IMPAIRED MOBILITY AND ADLS: ICD-10-CM

## 2021-01-01 LAB
ALBUMIN SERPL-MCNC: 2.5 G/DL (ref 3.4–5)
ALBUMIN SERPL-MCNC: 2.6 G/DL (ref 3.4–5)
ALBUMIN SERPL-MCNC: 2.7 G/DL (ref 3.4–5)
ALBUMIN SERPL-MCNC: 2.8 G/DL (ref 3.4–5)
ALBUMIN SERPL-MCNC: 2.8 G/DL (ref 3.4–5)
ALBUMIN SERPL-MCNC: 2.9 G/DL (ref 3.4–5)
ALBUMIN SERPL-MCNC: 3 G/DL (ref 3.4–5)
ALBUMIN SERPL-MCNC: 3.2 G/DL (ref 3.4–5)
ALBUMIN SERPL-MCNC: 3.2 G/DL (ref 3.4–5)
ALBUMIN SERPL-MCNC: 3.3 G/DL (ref 3.4–5)
ALBUMIN SERPL-MCNC: 3.3 G/DL (ref 3.4–5)
ALBUMIN/GLOB SERPL: 0.6 {RATIO} (ref 1–2)
ALBUMIN/GLOB SERPL: 0.6 {RATIO} (ref 1–2)
ALBUMIN/GLOB SERPL: 0.7 {RATIO} (ref 1–2)
ALBUMIN/GLOB SERPL: 0.8 {RATIO} (ref 1–2)
ALBUMIN/GLOB SERPL: 0.8 {RATIO} (ref 1–2)
ALBUMIN/GLOB SERPL: 0.9 {RATIO} (ref 1–2)
ALBUMIN/GLOB SERPL: 1 {RATIO} (ref 1–2)
ALBUMIN/GLOB SERPL: 1.1 {RATIO} (ref 1–2)
ALP LIVER SERPL-CCNC: 101 U/L
ALP LIVER SERPL-CCNC: 106 U/L
ALP LIVER SERPL-CCNC: 113 U/L
ALP LIVER SERPL-CCNC: 121 U/L
ALP LIVER SERPL-CCNC: 123 U/L
ALP LIVER SERPL-CCNC: 124 U/L
ALP LIVER SERPL-CCNC: 129 U/L
ALP LIVER SERPL-CCNC: 129 U/L
ALP LIVER SERPL-CCNC: 130 U/L
ALP LIVER SERPL-CCNC: 137 U/L
ALP LIVER SERPL-CCNC: 87 U/L
ALP LIVER SERPL-CCNC: 97 U/L
ALP LIVER SERPL-CCNC: 98 U/L
ALT SERPL-CCNC: 14 U/L
ALT SERPL-CCNC: 14 U/L
ALT SERPL-CCNC: 16 U/L
ALT SERPL-CCNC: 17 U/L
ALT SERPL-CCNC: 18 U/L
ALT SERPL-CCNC: 18 U/L
ALT SERPL-CCNC: 19 U/L
ALT SERPL-CCNC: 22 U/L
ALT SERPL-CCNC: 23 U/L
ALT SERPL-CCNC: 24 U/L
ALT SERPL-CCNC: 27 U/L
ANION GAP SERPL CALC-SCNC: 10 MMOL/L (ref 0–18)
ANION GAP SERPL CALC-SCNC: 3 MMOL/L (ref 0–18)
ANION GAP SERPL CALC-SCNC: 5 MMOL/L (ref 0–18)
ANION GAP SERPL CALC-SCNC: 5 MMOL/L (ref 0–18)
ANION GAP SERPL CALC-SCNC: 6 MMOL/L (ref 0–18)
ANION GAP SERPL CALC-SCNC: 6 MMOL/L (ref 0–18)
ANION GAP SERPL CALC-SCNC: 7 MMOL/L (ref 0–18)
ANION GAP SERPL CALC-SCNC: 8 MMOL/L (ref 0–18)
AST SERPL-CCNC: 11 U/L (ref 15–37)
AST SERPL-CCNC: 14 U/L (ref 15–37)
AST SERPL-CCNC: 15 U/L (ref 15–37)
AST SERPL-CCNC: 15 U/L (ref 15–37)
AST SERPL-CCNC: 18 U/L (ref 15–37)
AST SERPL-CCNC: 19 U/L (ref 15–37)
AST SERPL-CCNC: 19 U/L (ref 15–37)
AST SERPL-CCNC: 21 U/L (ref 15–37)
AST SERPL-CCNC: 21 U/L (ref 15–37)
AST SERPL-CCNC: 23 U/L (ref 15–37)
AST SERPL-CCNC: 24 U/L (ref 15–37)
AST SERPL-CCNC: 24 U/L (ref 15–37)
AST SERPL-CCNC: 25 U/L (ref 15–37)
BASOPHILS # BLD AUTO: 0.04 X10(3) UL (ref 0–0.2)
BASOPHILS # BLD AUTO: 0.04 X10(3) UL (ref 0–0.2)
BASOPHILS # BLD AUTO: 0.05 X10(3) UL (ref 0–0.2)
BASOPHILS # BLD AUTO: 0.05 X10(3) UL (ref 0–0.2)
BASOPHILS # BLD AUTO: 0.06 X10(3) UL (ref 0–0.2)
BASOPHILS # BLD AUTO: 0.07 X10(3) UL (ref 0–0.2)
BASOPHILS # BLD AUTO: 0.08 X10(3) UL (ref 0–0.2)
BASOPHILS # BLD AUTO: 0.09 X10(3) UL (ref 0–0.2)
BASOPHILS NFR BLD AUTO: 0.8 %
BASOPHILS NFR BLD AUTO: 1 %
BASOPHILS NFR BLD AUTO: 1.1 %
BASOPHILS NFR BLD AUTO: 1.1 %
BASOPHILS NFR BLD AUTO: 1.2 %
BASOPHILS NFR BLD AUTO: 1.3 %
BASOPHILS NFR BLD AUTO: 1.5 %
BASOPHILS NFR BLD AUTO: 1.6 %
BILIRUB SERPL-MCNC: 0.2 MG/DL (ref 0.1–2)
BILIRUB SERPL-MCNC: 0.3 MG/DL (ref 0.1–2)
BILIRUB SERPL-MCNC: 0.4 MG/DL (ref 0.1–2)
BILIRUB SERPL-MCNC: 0.5 MG/DL (ref 0.1–2)
BILIRUB UR QL STRIP.AUTO: NEGATIVE
BUN BLD-MCNC: 19 MG/DL (ref 7–18)
BUN BLD-MCNC: 22 MG/DL (ref 7–18)
BUN BLD-MCNC: 23 MG/DL (ref 7–18)
BUN BLD-MCNC: 24 MG/DL (ref 7–18)
BUN BLD-MCNC: 24 MG/DL (ref 7–18)
BUN BLD-MCNC: 26 MG/DL (ref 7–18)
BUN BLD-MCNC: 26 MG/DL (ref 7–18)
BUN BLD-MCNC: 28 MG/DL (ref 7–18)
BUN BLD-MCNC: 28 MG/DL (ref 7–18)
BUN BLD-MCNC: 29 MG/DL (ref 7–18)
BUN BLD-MCNC: 29 MG/DL (ref 7–18)
BUN/CREAT SERPL: 17.3 (ref 10–20)
BUN/CREAT SERPL: 19.1 (ref 10–20)
BUN/CREAT SERPL: 19.5 (ref 10–20)
BUN/CREAT SERPL: 19.5 (ref 10–20)
BUN/CREAT SERPL: 19.6 (ref 10–20)
BUN/CREAT SERPL: 20.1 (ref 10–20)
BUN/CREAT SERPL: 20.2 (ref 10–20)
BUN/CREAT SERPL: 22.7 (ref 10–20)
CALCIUM BLD-MCNC: 8.2 MG/DL (ref 8.5–10.1)
CALCIUM BLD-MCNC: 8.4 MG/DL (ref 8.5–10.1)
CALCIUM BLD-MCNC: 8.4 MG/DL (ref 8.5–10.1)
CALCIUM BLD-MCNC: 8.5 MG/DL (ref 8.5–10.1)
CALCIUM BLD-MCNC: 8.5 MG/DL (ref 8.5–10.1)
CALCIUM BLD-MCNC: 8.6 MG/DL (ref 8.5–10.1)
CALCIUM BLD-MCNC: 8.7 MG/DL (ref 8.5–10.1)
CALCIUM BLD-MCNC: 8.9 MG/DL (ref 8.5–10.1)
CALCIUM BLD-MCNC: 8.9 MG/DL (ref 8.5–10.1)
CALCIUM BLD-MCNC: 9 MG/DL (ref 8.5–10.1)
CALCIUM BLD-MCNC: 9.1 MG/DL (ref 8.5–10.1)
CANCER AG19-9 SERPL-ACNC: 3693.1 U/ML (ref ?–37)
CANCER AG19-9 SERPL-ACNC: 742.7 U/ML (ref ?–37)
CANCER AG19-9 SERPL-ACNC: ABNORMAL U/ML (ref ?–37)
CHLORIDE SERPL-SCNC: 106 MMOL/L (ref 98–112)
CHLORIDE SERPL-SCNC: 107 MMOL/L (ref 98–112)
CHLORIDE SERPL-SCNC: 108 MMOL/L (ref 98–112)
CHLORIDE SERPL-SCNC: 110 MMOL/L (ref 98–112)
CO2 SERPL-SCNC: 23 MMOL/L (ref 21–32)
CO2 SERPL-SCNC: 24 MMOL/L (ref 21–32)
CO2 SERPL-SCNC: 24 MMOL/L (ref 21–32)
CO2 SERPL-SCNC: 25 MMOL/L (ref 21–32)
CO2 SERPL-SCNC: 26 MMOL/L (ref 21–32)
CO2 SERPL-SCNC: 26 MMOL/L (ref 21–32)
CO2 SERPL-SCNC: 27 MMOL/L (ref 21–32)
COLOR UR AUTO: YELLOW
CREAT BLD-MCNC: 1.23 MG/DL
CREAT BLD-MCNC: 1.28 MG/DL
CREAT BLD-MCNC: 1.28 MG/DL
CREAT BLD-MCNC: 1.29 MG/DL
CREAT BLD-MCNC: 1.36 MG/DL
CREAT BLD-MCNC: 1.37 MG/DL
CREAT BLD-MCNC: 1.39 MG/DL
CREAT BLD-MCNC: 1.39 MG/DL
CREAT BLD-MCNC: 1.4 MG/DL
CREAT BLD-MCNC: 1.42 MG/DL
CREAT BLD-MCNC: 1.43 MG/DL
CREAT BLD-MCNC: 1.49 MG/DL
CREAT BLD-MCNC: 1.5 MG/DL
DEPRECATED RDW RBC AUTO: 54.7 FL (ref 35.1–46.3)
DEPRECATED RDW RBC AUTO: 55.3 FL (ref 35.1–46.3)
DEPRECATED RDW RBC AUTO: 56.2 FL (ref 35.1–46.3)
DEPRECATED RDW RBC AUTO: 57.5 FL (ref 35.1–46.3)
DEPRECATED RDW RBC AUTO: 59.8 FL (ref 35.1–46.3)
DEPRECATED RDW RBC AUTO: 61.1 FL (ref 35.1–46.3)
DEPRECATED RDW RBC AUTO: 64.1 FL (ref 35.1–46.3)
DEPRECATED RDW RBC AUTO: 70 FL (ref 35.1–46.3)
EOSINOPHIL # BLD AUTO: 0.11 X10(3) UL (ref 0–0.7)
EOSINOPHIL # BLD AUTO: 0.11 X10(3) UL (ref 0–0.7)
EOSINOPHIL # BLD AUTO: 0.12 X10(3) UL (ref 0–0.7)
EOSINOPHIL # BLD AUTO: 0.12 X10(3) UL (ref 0–0.7)
EOSINOPHIL # BLD AUTO: 0.2 X10(3) UL (ref 0–0.7)
EOSINOPHIL # BLD AUTO: 0.21 X10(3) UL (ref 0–0.7)
EOSINOPHIL # BLD AUTO: 0.22 X10(3) UL (ref 0–0.7)
EOSINOPHIL # BLD AUTO: 0.29 X10(3) UL (ref 0–0.7)
EOSINOPHIL # BLD AUTO: 0.31 X10(3) UL (ref 0–0.7)
EOSINOPHIL # BLD AUTO: 0.31 X10(3) UL (ref 0–0.7)
EOSINOPHIL # BLD AUTO: 0.35 X10(3) UL (ref 0–0.7)
EOSINOPHIL # BLD AUTO: 0.35 X10(3) UL (ref 0–0.7)
EOSINOPHIL # BLD AUTO: 0.48 X10(3) UL (ref 0–0.7)
EOSINOPHIL NFR BLD AUTO: 1.9 %
EOSINOPHIL NFR BLD AUTO: 1.9 %
EOSINOPHIL NFR BLD AUTO: 2.7 %
EOSINOPHIL NFR BLD AUTO: 3 %
EOSINOPHIL NFR BLD AUTO: 3.1 %
EOSINOPHIL NFR BLD AUTO: 3.5 %
EOSINOPHIL NFR BLD AUTO: 4.4 %
EOSINOPHIL NFR BLD AUTO: 4.4 %
EOSINOPHIL NFR BLD AUTO: 4.7 %
EOSINOPHIL NFR BLD AUTO: 4.8 %
EOSINOPHIL NFR BLD AUTO: 4.8 %
EOSINOPHIL NFR BLD AUTO: 7.6 %
EOSINOPHIL NFR BLD AUTO: 8.1 %
ERYTHROCYTE [DISTWIDTH] IN BLOOD BY AUTOMATED COUNT: 14 % (ref 11–15)
ERYTHROCYTE [DISTWIDTH] IN BLOOD BY AUTOMATED COUNT: 14.1 %
ERYTHROCYTE [DISTWIDTH] IN BLOOD BY AUTOMATED COUNT: 14.2 % (ref 11–15)
ERYTHROCYTE [DISTWIDTH] IN BLOOD BY AUTOMATED COUNT: 14.3 %
ERYTHROCYTE [DISTWIDTH] IN BLOOD BY AUTOMATED COUNT: 14.6 %
ERYTHROCYTE [DISTWIDTH] IN BLOOD BY AUTOMATED COUNT: 14.9 % (ref 11–15)
ERYTHROCYTE [DISTWIDTH] IN BLOOD BY AUTOMATED COUNT: 14.9 % (ref 11–15)
ERYTHROCYTE [DISTWIDTH] IN BLOOD BY AUTOMATED COUNT: 15.3 %
ERYTHROCYTE [DISTWIDTH] IN BLOOD BY AUTOMATED COUNT: 15.6 % (ref 11–15)
ERYTHROCYTE [DISTWIDTH] IN BLOOD BY AUTOMATED COUNT: 16.5 %
ERYTHROCYTE [DISTWIDTH] IN BLOOD BY AUTOMATED COUNT: 16.5 % (ref 11–15)
ERYTHROCYTE [DISTWIDTH] IN BLOOD BY AUTOMATED COUNT: 17.2 % (ref 11–15)
ERYTHROCYTE [DISTWIDTH] IN BLOOD BY AUTOMATED COUNT: 17.6 % (ref 11–15)
FOLATE SERPL-MCNC: 36.7 NG/ML (ref 8.7–?)
GLOBULIN PLAS-MCNC: 3.1 G/DL (ref 2.8–4.4)
GLOBULIN PLAS-MCNC: 3.3 G/DL (ref 2.8–4.4)
GLOBULIN PLAS-MCNC: 3.4 G/DL (ref 2.8–4.4)
GLOBULIN PLAS-MCNC: 3.5 G/DL (ref 2.8–4.4)
GLOBULIN PLAS-MCNC: 3.6 G/DL (ref 2.8–4.4)
GLOBULIN PLAS-MCNC: 3.7 G/DL (ref 2.8–4.4)
GLOBULIN PLAS-MCNC: 3.8 G/DL (ref 2.8–4.4)
GLOBULIN PLAS-MCNC: 3.8 G/DL (ref 2.8–4.4)
GLOBULIN PLAS-MCNC: 3.9 G/DL (ref 2.8–4.4)
GLOBULIN PLAS-MCNC: 4 G/DL (ref 2.8–4.4)
GLOBULIN PLAS-MCNC: 4 G/DL (ref 2.8–4.4)
GLOBULIN PLAS-MCNC: 4.1 G/DL (ref 2.8–4.4)
GLOBULIN PLAS-MCNC: 4.3 G/DL (ref 2.8–4.4)
GLUCOSE BLD-MCNC: 101 MG/DL (ref 70–99)
GLUCOSE BLD-MCNC: 101 MG/DL (ref 70–99)
GLUCOSE BLD-MCNC: 103 MG/DL (ref 70–99)
GLUCOSE BLD-MCNC: 110 MG/DL (ref 70–99)
GLUCOSE BLD-MCNC: 112 MG/DL (ref 70–99)
GLUCOSE BLD-MCNC: 113 MG/DL (ref 70–99)
GLUCOSE BLD-MCNC: 129 MG/DL (ref 70–99)
GLUCOSE BLD-MCNC: 74 MG/DL (ref 70–99)
GLUCOSE BLD-MCNC: 87 MG/DL (ref 70–99)
GLUCOSE BLD-MCNC: 88 MG/DL (ref 70–99)
GLUCOSE BLD-MCNC: 90 MG/DL (ref 70–99)
GLUCOSE BLD-MCNC: 91 MG/DL (ref 70–99)
GLUCOSE BLD-MCNC: 93 MG/DL (ref 70–99)
GLUCOSE UR STRIP.AUTO-MCNC: NEGATIVE MG/DL
HCT VFR BLD AUTO: 26.5 %
HCT VFR BLD AUTO: 26.8 %
HCT VFR BLD AUTO: 28.1 %
HCT VFR BLD AUTO: 28.2 %
HCT VFR BLD AUTO: 29 %
HCT VFR BLD AUTO: 29.4 %
HCT VFR BLD AUTO: 29.6 %
HCT VFR BLD AUTO: 30.4 %
HCT VFR BLD AUTO: 31.1 %
HCT VFR BLD AUTO: 32.9 %
HCT VFR BLD AUTO: 34.8 %
HCT VFR BLD AUTO: 34.8 %
HCT VFR BLD AUTO: 35 %
HGB BLD-MCNC: 10.1 G/DL
HGB BLD-MCNC: 10.5 G/DL
HGB BLD-MCNC: 10.7 G/DL
HGB BLD-MCNC: 10.9 G/DL
HGB BLD-MCNC: 11.2 G/DL
HGB BLD-MCNC: 8.4 G/DL
HGB BLD-MCNC: 8.6 G/DL
HGB BLD-MCNC: 8.8 G/DL
HGB BLD-MCNC: 9 G/DL
HGB BLD-MCNC: 9.2 G/DL
HGB BLD-MCNC: 9.5 G/DL
HGB BLD-MCNC: 9.6 G/DL
HGB BLD-MCNC: 9.6 G/DL
HGB RETIC QN AUTO: 38.9 PG (ref 28.2–36.6)
IMM GRANULOCYTES # BLD AUTO: 0.01 X10(3) UL (ref 0–1)
IMM GRANULOCYTES # BLD AUTO: 0.02 X10(3) UL (ref 0–1)
IMM GRANULOCYTES # BLD AUTO: 0.03 X10(3) UL (ref 0–1)
IMM GRANULOCYTES NFR BLD: 0.1 %
IMM GRANULOCYTES NFR BLD: 0.2 %
IMM GRANULOCYTES NFR BLD: 0.3 %
IMM GRANULOCYTES NFR BLD: 0.3 %
IMM GRANULOCYTES NFR BLD: 0.4 %
IMM GRANULOCYTES NFR BLD: 0.5 %
IMM GRANULOCYTES NFR BLD: 0.8 %
IMM RETICS NFR: 0.22 RATIO (ref 0.1–0.3)
INR BLD: 0.98 (ref 0.89–1.11)
KETONES UR STRIP.AUTO-MCNC: NEGATIVE MG/DL
LYMPHOCYTES # BLD AUTO: 0.9 X10(3) UL (ref 1–4)
LYMPHOCYTES # BLD AUTO: 0.95 X10(3) UL (ref 1–4)
LYMPHOCYTES # BLD AUTO: 0.97 X10(3) UL (ref 1–4)
LYMPHOCYTES # BLD AUTO: 0.97 X10(3) UL (ref 1–4)
LYMPHOCYTES # BLD AUTO: 0.98 X10(3) UL (ref 1–4)
LYMPHOCYTES # BLD AUTO: 1.02 X10(3) UL (ref 1–4)
LYMPHOCYTES # BLD AUTO: 1.02 X10(3) UL (ref 1–4)
LYMPHOCYTES # BLD AUTO: 1.03 X10(3) UL (ref 1–4)
LYMPHOCYTES # BLD AUTO: 1.05 X10(3) UL (ref 1–4)
LYMPHOCYTES # BLD AUTO: 1.16 X10(3) UL (ref 1–4)
LYMPHOCYTES # BLD AUTO: 1.18 X10(3) UL (ref 1–4)
LYMPHOCYTES # BLD AUTO: 1.2 X10(3) UL (ref 1–4)
LYMPHOCYTES # BLD AUTO: 1.38 X10(3) UL (ref 1–4)
LYMPHOCYTES NFR BLD AUTO: 13.8 %
LYMPHOCYTES NFR BLD AUTO: 14.2 %
LYMPHOCYTES NFR BLD AUTO: 15.5 %
LYMPHOCYTES NFR BLD AUTO: 15.5 %
LYMPHOCYTES NFR BLD AUTO: 15.8 %
LYMPHOCYTES NFR BLD AUTO: 16.6 %
LYMPHOCYTES NFR BLD AUTO: 17.4 %
LYMPHOCYTES NFR BLD AUTO: 20.2 %
LYMPHOCYTES NFR BLD AUTO: 21 %
LYMPHOCYTES NFR BLD AUTO: 21.3 %
LYMPHOCYTES NFR BLD AUTO: 24.6 %
LYMPHOCYTES NFR BLD AUTO: 25.1 %
LYMPHOCYTES NFR BLD AUTO: 25.4 %
M PROTEIN MFR SERPL ELPH: 6.4 G/DL (ref 6.4–8.2)
M PROTEIN MFR SERPL ELPH: 6.4 G/DL (ref 6.4–8.2)
M PROTEIN MFR SERPL ELPH: 6.5 G/DL (ref 6.4–8.2)
M PROTEIN MFR SERPL ELPH: 6.6 G/DL (ref 6.4–8.2)
M PROTEIN MFR SERPL ELPH: 6.6 G/DL (ref 6.4–8.2)
M PROTEIN MFR SERPL ELPH: 6.7 G/DL (ref 6.4–8.2)
M PROTEIN MFR SERPL ELPH: 6.9 G/DL (ref 6.4–8.2)
MCH RBC QN AUTO: 31.1 PG (ref 26–34)
MCH RBC QN AUTO: 31.9 PG (ref 26–34)
MCH RBC QN AUTO: 32.5 PG (ref 26–34)
MCH RBC QN AUTO: 32.7 PG (ref 26–34)
MCH RBC QN AUTO: 32.8 PG (ref 26–34)
MCH RBC QN AUTO: 33 PG (ref 26–34)
MCH RBC QN AUTO: 33.1 PG (ref 26–34)
MCH RBC QN AUTO: 33.2 PG (ref 26–34)
MCH RBC QN AUTO: 33.2 PG (ref 26–34)
MCH RBC QN AUTO: 33.3 PG (ref 26–34)
MCH RBC QN AUTO: 34.5 PG (ref 26–34)
MCH RBC QN AUTO: 34.5 PG (ref 26–34)
MCH RBC QN AUTO: 34.9 PG (ref 26–34)
MCHC RBC AUTO-ENTMCNC: 30.6 G/DL (ref 31–37)
MCHC RBC AUTO-ENTMCNC: 31.3 G/DL (ref 31–37)
MCHC RBC AUTO-ENTMCNC: 31.3 G/DL (ref 31–37)
MCHC RBC AUTO-ENTMCNC: 31.6 G/DL (ref 31–37)
MCHC RBC AUTO-ENTMCNC: 31.7 G/DL (ref 31–37)
MCHC RBC AUTO-ENTMCNC: 31.7 G/DL (ref 31–37)
MCHC RBC AUTO-ENTMCNC: 31.9 G/DL (ref 31–37)
MCHC RBC AUTO-ENTMCNC: 31.9 G/DL (ref 31–37)
MCHC RBC AUTO-ENTMCNC: 32.1 G/DL (ref 31–37)
MCHC RBC AUTO-ENTMCNC: 32.1 G/DL (ref 31–37)
MCHC RBC AUTO-ENTMCNC: 32.2 G/DL (ref 31–37)
MCHC RBC AUTO-ENTMCNC: 32.5 G/DL (ref 31–37)
MCHC RBC AUTO-ENTMCNC: 32.7 G/DL (ref 31–37)
MCV RBC AUTO: 101.8 FL
MCV RBC AUTO: 102.1 FL
MCV RBC AUTO: 102.3 FL
MCV RBC AUTO: 102.5 FL
MCV RBC AUTO: 102.5 FL
MCV RBC AUTO: 103.5 FL
MCV RBC AUTO: 105.2 FL
MCV RBC AUTO: 105.5 FL
MCV RBC AUTO: 106.1 FL
MCV RBC AUTO: 108.2 FL
MCV RBC AUTO: 108.4 FL
MCV RBC AUTO: 108.4 FL
MCV RBC AUTO: 98.1 FL
MONOCYTES # BLD AUTO: 0.69 X10(3) UL (ref 0.1–1)
MONOCYTES # BLD AUTO: 0.75 X10(3) UL (ref 0.1–1)
MONOCYTES # BLD AUTO: 0.76 X10(3) UL (ref 0.1–1)
MONOCYTES # BLD AUTO: 0.76 X10(3) UL (ref 0.1–1)
MONOCYTES # BLD AUTO: 0.86 X10(3) UL (ref 0.1–1)
MONOCYTES # BLD AUTO: 0.86 X10(3) UL (ref 0.1–1)
MONOCYTES # BLD AUTO: 0.88 X10(3) UL (ref 0.1–1)
MONOCYTES # BLD AUTO: 0.92 X10(3) UL (ref 0.1–1)
MONOCYTES # BLD AUTO: 1 X10(3) UL (ref 0.1–1)
MONOCYTES # BLD AUTO: 1.01 X10(3) UL (ref 0.1–1)
MONOCYTES # BLD AUTO: 1.04 X10(3) UL (ref 0.1–1)
MONOCYTES NFR BLD AUTO: 11.6 %
MONOCYTES NFR BLD AUTO: 11.7 %
MONOCYTES NFR BLD AUTO: 12 %
MONOCYTES NFR BLD AUTO: 12.6 %
MONOCYTES NFR BLD AUTO: 13.1 %
MONOCYTES NFR BLD AUTO: 14.1 %
MONOCYTES NFR BLD AUTO: 14.4 %
MONOCYTES NFR BLD AUTO: 14.6 %
MONOCYTES NFR BLD AUTO: 15.3 %
MONOCYTES NFR BLD AUTO: 16.7 %
MONOCYTES NFR BLD AUTO: 17.9 %
MONOCYTES NFR BLD AUTO: 18.5 %
MONOCYTES NFR BLD AUTO: 20 %
NEUTROPHILS # BLD AUTO: 1.95 X10 (3) UL (ref 1.5–7.7)
NEUTROPHILS # BLD AUTO: 1.95 X10(3) UL (ref 1.5–7.7)
NEUTROPHILS # BLD AUTO: 2.01 X10 (3) UL (ref 1.5–7.7)
NEUTROPHILS # BLD AUTO: 2.01 X10(3) UL (ref 1.5–7.7)
NEUTROPHILS # BLD AUTO: 2.23 X10 (3) UL (ref 1.5–7.7)
NEUTROPHILS # BLD AUTO: 2.23 X10(3) UL (ref 1.5–7.7)
NEUTROPHILS # BLD AUTO: 2.42 X10 (3) UL (ref 1.5–7.7)
NEUTROPHILS # BLD AUTO: 2.42 X10(3) UL (ref 1.5–7.7)
NEUTROPHILS # BLD AUTO: 2.77 X10 (3) UL (ref 1.5–7.7)
NEUTROPHILS # BLD AUTO: 2.77 X10(3) UL (ref 1.5–7.7)
NEUTROPHILS # BLD AUTO: 3.44 X10 (3) UL (ref 1.5–7.7)
NEUTROPHILS # BLD AUTO: 3.44 X10(3) UL (ref 1.5–7.7)
NEUTROPHILS # BLD AUTO: 3.86 X10 (3) UL (ref 1.5–7.7)
NEUTROPHILS # BLD AUTO: 3.86 X10(3) UL (ref 1.5–7.7)
NEUTROPHILS # BLD AUTO: 4.21 X10 (3) UL (ref 1.5–7.7)
NEUTROPHILS # BLD AUTO: 4.21 X10(3) UL (ref 1.5–7.7)
NEUTROPHILS # BLD AUTO: 4.28 X10 (3) UL (ref 1.5–7.7)
NEUTROPHILS # BLD AUTO: 4.28 X10(3) UL (ref 1.5–7.7)
NEUTROPHILS # BLD AUTO: 4.48 X10 (3) UL (ref 1.5–7.7)
NEUTROPHILS # BLD AUTO: 4.48 X10(3) UL (ref 1.5–7.7)
NEUTROPHILS # BLD AUTO: 4.9 X10 (3) UL (ref 1.5–7.7)
NEUTROPHILS # BLD AUTO: 4.9 X10(3) UL (ref 1.5–7.7)
NEUTROPHILS # BLD AUTO: 5.05 X10 (3) UL (ref 1.5–7.7)
NEUTROPHILS # BLD AUTO: 5.05 X10(3) UL (ref 1.5–7.7)
NEUTROPHILS # BLD AUTO: 5.74 X10 (3) UL (ref 1.5–7.7)
NEUTROPHILS # BLD AUTO: 5.74 X10(3) UL (ref 1.5–7.7)
NEUTROPHILS NFR BLD AUTO: 47.6 %
NEUTROPHILS NFR BLD AUTO: 52 %
NEUTROPHILS NFR BLD AUTO: 52.5 %
NEUTROPHILS NFR BLD AUTO: 54 %
NEUTROPHILS NFR BLD AUTO: 57.7 %
NEUTROPHILS NFR BLD AUTO: 58 %
NEUTROPHILS NFR BLD AUTO: 63.9 %
NEUTROPHILS NFR BLD AUTO: 65.1 %
NEUTROPHILS NFR BLD AUTO: 65.6 %
NEUTROPHILS NFR BLD AUTO: 66.5 %
NEUTROPHILS NFR BLD AUTO: 67.2 %
NEUTROPHILS NFR BLD AUTO: 68.6 %
NEUTROPHILS NFR BLD AUTO: 69.1 %
NITRITE UR QL STRIP.AUTO: NEGATIVE
OSMOLALITY SERPL CALC.SUM OF ELEC: 285 MOSM/KG (ref 275–295)
OSMOLALITY SERPL CALC.SUM OF ELEC: 287 MOSM/KG (ref 275–295)
OSMOLALITY SERPL CALC.SUM OF ELEC: 288 MOSM/KG (ref 275–295)
OSMOLALITY SERPL CALC.SUM OF ELEC: 288 MOSM/KG (ref 275–295)
OSMOLALITY SERPL CALC.SUM OF ELEC: 289 MOSM/KG (ref 275–295)
OSMOLALITY SERPL CALC.SUM OF ELEC: 290 MOSM/KG (ref 275–295)
OSMOLALITY SERPL CALC.SUM OF ELEC: 292 MOSM/KG (ref 275–295)
OSMOLALITY SERPL CALC.SUM OF ELEC: 293 MOSM/KG (ref 275–295)
OSMOLALITY SERPL CALC.SUM OF ELEC: 294 MOSM/KG (ref 275–295)
OSMOLALITY SERPL CALC.SUM OF ELEC: 295 MOSM/KG (ref 275–295)
OSMOLALITY SERPL CALC.SUM OF ELEC: 296 MOSM/KG (ref 275–295)
PATIENT FASTING Y/N/NP: NO
PH UR STRIP.AUTO: 5 [PH] (ref 4.5–8)
PLATELET # BLD AUTO: 153 10(3)UL (ref 150–450)
PLATELET # BLD AUTO: 153 10(3)UL (ref 150–450)
PLATELET # BLD AUTO: 154 10(3)UL (ref 150–450)
PLATELET # BLD AUTO: 159 10(3)UL (ref 150–450)
PLATELET # BLD AUTO: 163 10(3)UL (ref 150–450)
PLATELET # BLD AUTO: 178 10(3)UL (ref 150–450)
PLATELET # BLD AUTO: 179 10(3)UL (ref 150–450)
PLATELET # BLD AUTO: 196 10(3)UL (ref 150–450)
PLATELET # BLD AUTO: 200 10(3)UL (ref 150–450)
PLATELET # BLD AUTO: 238 10(3)UL (ref 150–450)
PLATELET # BLD AUTO: 238 10(3)UL (ref 150–450)
PLATELET # BLD AUTO: 254 10(3)UL (ref 150–450)
PLATELET # BLD AUTO: 293 10(3)UL (ref 150–450)
PLATELET MORPHOLOGY: NORMAL
POTASSIUM SERPL-SCNC: 3.8 MMOL/L (ref 3.5–5.1)
POTASSIUM SERPL-SCNC: 4 MMOL/L (ref 3.5–5.1)
POTASSIUM SERPL-SCNC: 4.1 MMOL/L (ref 3.5–5.1)
POTASSIUM SERPL-SCNC: 4.2 MMOL/L (ref 3.5–5.1)
POTASSIUM SERPL-SCNC: 4.2 MMOL/L (ref 3.5–5.1)
POTASSIUM SERPL-SCNC: 4.3 MMOL/L (ref 3.5–5.1)
POTASSIUM SERPL-SCNC: 4.4 MMOL/L (ref 3.5–5.1)
POTASSIUM SERPL-SCNC: 4.4 MMOL/L (ref 3.5–5.1)
PROT SERPL-MCNC: 6.6 G/DL (ref 6.4–8.2)
PROT SERPL-MCNC: 6.9 G/DL (ref 6.4–8.2)
PROT UR STRIP.AUTO-MCNC: NEGATIVE MG/DL
PSA SERPL DL<=0.01 NG/ML-MCNC: 13.3 SECONDS (ref 12.4–14.6)
RBC # BLD AUTO: 2.62 X10(6)UL
RBC # BLD AUTO: 2.7 X10(6)UL
RBC # BLD AUTO: 2.75 X10(6)UL
RBC # BLD AUTO: 2.76 X10(6)UL
RBC # BLD AUTO: 2.83 X10(6)UL
RBC # BLD AUTO: 2.86 X10(6)UL
RBC # BLD AUTO: 2.88 X10(6)UL
RBC # BLD AUTO: 2.89 X10(6)UL
RBC # BLD AUTO: 2.93 X10(6)UL
RBC # BLD AUTO: 3.04 X10(6)UL
RBC # BLD AUTO: 3.21 X10(6)UL
RBC # BLD AUTO: 3.23 X10(6)UL
RBC # BLD AUTO: 3.3 X10(6)UL
RBC UR QL AUTO: NEGATIVE
RETICS # AUTO: 93.6 X10(3) UL (ref 22.5–147.5)
RETICS/RBC NFR AUTO: 3 %
SARS-COV-2 RNA RESP QL NAA+PROBE: NOT DETECTED
SODIUM SERPL-SCNC: 136 MMOL/L (ref 136–145)
SODIUM SERPL-SCNC: 137 MMOL/L (ref 136–145)
SODIUM SERPL-SCNC: 138 MMOL/L (ref 136–145)
SODIUM SERPL-SCNC: 138 MMOL/L (ref 136–145)
SODIUM SERPL-SCNC: 139 MMOL/L (ref 136–145)
SODIUM SERPL-SCNC: 140 MMOL/L (ref 136–145)
SODIUM SERPL-SCNC: 141 MMOL/L (ref 136–145)
SP GR UR STRIP.AUTO: 1.02 (ref 1–1.03)
UROBILINOGEN UR STRIP.AUTO-MCNC: <2 MG/DL
VIT B12 SERPL-MCNC: >2000 PG/ML (ref 193–986)
WBC # BLD AUTO: 3.9 X10(3) UL (ref 4–11)
WBC # BLD AUTO: 4.1 X10(3) UL (ref 4–11)
WBC # BLD AUTO: 4.1 X10(3) UL (ref 4–11)
WBC # BLD AUTO: 4.6 X10(3) UL (ref 4–11)
WBC # BLD AUTO: 4.8 X10(3) UL (ref 4–11)
WBC # BLD AUTO: 5.9 X10(3) UL (ref 4–11)
WBC # BLD AUTO: 6 X10(3) UL (ref 4–11)
WBC # BLD AUTO: 6.3 X10(3) UL (ref 4–11)
WBC # BLD AUTO: 6.5 X10(3) UL (ref 4–11)
WBC # BLD AUTO: 6.6 X10(3) UL (ref 4–11)
WBC # BLD AUTO: 7.4 X10(3) UL (ref 4–11)
WBC # BLD AUTO: 7.9 X10(3) UL (ref 4–11)
WBC # BLD AUTO: 8.3 X10(3) UL (ref 4–11)

## 2021-01-01 PROCEDURE — 49083 ABD PARACENTESIS W/IMAGING: CPT | Performed by: STUDENT IN AN ORGANIZED HEALTH CARE EDUCATION/TRAINING PROGRAM

## 2021-01-01 PROCEDURE — 96413 CHEMO IV INFUSION 1 HR: CPT

## 2021-01-01 PROCEDURE — 86301 IMMUNOASSAY TUMOR CA 19-9: CPT

## 2021-01-01 PROCEDURE — 96415 CHEMO IV INFUSION ADDL HR: CPT

## 2021-01-01 PROCEDURE — 96368 THER/DIAG CONCURRENT INF: CPT

## 2021-01-01 PROCEDURE — 99215 OFFICE O/P EST HI 40 MIN: CPT | Performed by: INTERNAL MEDICINE

## 2021-01-01 PROCEDURE — 96375 TX/PRO/DX INJ NEW DRUG ADDON: CPT

## 2021-01-01 PROCEDURE — 96411 CHEMO IV PUSH ADDL DRUG: CPT

## 2021-01-01 PROCEDURE — 99232 SBSQ HOSP IP/OBS MODERATE 35: CPT | Performed by: INTERNAL MEDICINE

## 2021-01-01 PROCEDURE — 99309 SBSQ NF CARE MODERATE MDM 30: CPT | Performed by: NURSE PRACTITIONER

## 2021-01-01 PROCEDURE — 96523 IRRIG DRUG DELIVERY DEVICE: CPT

## 2021-01-01 PROCEDURE — 87147 CULTURE TYPE IMMUNOLOGIC: CPT

## 2021-01-01 PROCEDURE — 1111F DSCHRG MED/CURRENT MED MERGE: CPT | Performed by: NURSE PRACTITIONER

## 2021-01-01 PROCEDURE — 85025 COMPLETE CBC W/AUTO DIFF WBC: CPT

## 2021-01-01 PROCEDURE — 80053 COMPREHEN METABOLIC PANEL: CPT

## 2021-01-01 PROCEDURE — 74177 CT ABD & PELVIS W/CONTRAST: CPT | Performed by: CLINICAL NURSE SPECIALIST

## 2021-01-01 PROCEDURE — 99232 SBSQ HOSP IP/OBS MODERATE 35: CPT | Performed by: NURSE PRACTITIONER

## 2021-01-01 PROCEDURE — 99223 1ST HOSP IP/OBS HIGH 75: CPT | Performed by: HOSPITALIST

## 2021-01-01 PROCEDURE — 93970 EXTREMITY STUDY: CPT | Performed by: INTERNAL MEDICINE

## 2021-01-01 PROCEDURE — A9575 INJ GADOTERATE MEGLUMI 0.1ML: HCPCS | Performed by: INTERNAL MEDICINE

## 2021-01-01 PROCEDURE — 99215 OFFICE O/P EST HI 40 MIN: CPT | Performed by: CLINICAL NURSE SPECIALIST

## 2021-01-01 PROCEDURE — 85045 AUTOMATED RETICULOCYTE COUNT: CPT

## 2021-01-01 PROCEDURE — 96361 HYDRATE IV INFUSION ADD-ON: CPT

## 2021-01-01 PROCEDURE — 36415 COLL VENOUS BLD VENIPUNCTURE: CPT

## 2021-01-01 PROCEDURE — 99232 SBSQ HOSP IP/OBS MODERATE 35: CPT | Performed by: STUDENT IN AN ORGANIZED HEALTH CARE EDUCATION/TRAINING PROGRAM

## 2021-01-01 PROCEDURE — 30233N1 TRANSFUSION OF NONAUTOLOGOUS RED BLOOD CELLS INTO PERIPHERAL VEIN, PERCUTANEOUS APPROACH: ICD-10-PCS | Performed by: INTERNAL MEDICINE

## 2021-01-01 PROCEDURE — 0001A SARSCOV2 VAC 30MCG/0.3ML IM: CPT

## 2021-01-01 PROCEDURE — 99239 HOSP IP/OBS DSCHRG MGMT >30: CPT | Performed by: INTERNAL MEDICINE

## 2021-01-01 PROCEDURE — 87086 URINE CULTURE/COLONY COUNT: CPT

## 2021-01-01 PROCEDURE — 96372 THER/PROPH/DIAG INJ SC/IM: CPT

## 2021-01-01 PROCEDURE — 88342 IMHCHEM/IMCYTCHM 1ST ANTB: CPT | Performed by: INTERNAL MEDICINE

## 2021-01-01 PROCEDURE — 99223 1ST HOSP IP/OBS HIGH 75: CPT | Performed by: INTERNAL MEDICINE

## 2021-01-01 PROCEDURE — 99152 MOD SED SAME PHYS/QHP 5/>YRS: CPT | Performed by: INTERNAL MEDICINE

## 2021-01-01 PROCEDURE — 30233H1 TRANSFUSION OF NONAUTOLOGOUS WHOLE BLOOD INTO PERIPHERAL VEIN, PERCUTANEOUS APPROACH: ICD-10-PCS | Performed by: INTERNAL MEDICINE

## 2021-01-01 PROCEDURE — 96360 HYDRATION IV INFUSION INIT: CPT

## 2021-01-01 PROCEDURE — 71045 X-RAY EXAM CHEST 1 VIEW: CPT | Performed by: EMERGENCY MEDICINE

## 2021-01-01 PROCEDURE — 76942 ECHO GUIDE FOR BIOPSY: CPT | Performed by: INTERNAL MEDICINE

## 2021-01-01 PROCEDURE — 0W9G3ZZ DRAINAGE OF PERITONEAL CAVITY, PERCUTANEOUS APPROACH: ICD-10-PCS | Performed by: STUDENT IN AN ORGANIZED HEALTH CARE EDUCATION/TRAINING PROGRAM

## 2021-01-01 PROCEDURE — 85610 PROTHROMBIN TIME: CPT

## 2021-01-01 PROCEDURE — 1123F ACP DISCUSS/DSCN MKR DOCD: CPT | Performed by: NURSE PRACTITIONER

## 2021-01-01 PROCEDURE — 81001 URINALYSIS AUTO W/SCOPE: CPT

## 2021-01-01 PROCEDURE — 99310 SBSQ NF CARE HIGH MDM 45: CPT | Performed by: NURSE PRACTITIONER

## 2021-01-01 PROCEDURE — 74177 CT ABD & PELVIS W/CONTRAST: CPT | Performed by: INTERNAL MEDICINE

## 2021-01-01 PROCEDURE — 93971 EXTREMITY STUDY: CPT | Performed by: INTERNAL MEDICINE

## 2021-01-01 PROCEDURE — 99231 SBSQ HOSP IP/OBS SF/LOW 25: CPT | Performed by: INTERNAL MEDICINE

## 2021-01-01 PROCEDURE — 88381 MICRODISSECTION MANUAL: CPT | Performed by: INTERNAL MEDICINE

## 2021-01-01 PROCEDURE — 36591 DRAW BLOOD OFF VENOUS DEVICE: CPT

## 2021-01-01 PROCEDURE — 49083 ABD PARACENTESIS W/IMAGING: CPT | Performed by: INTERNAL MEDICINE

## 2021-01-01 PROCEDURE — 99233 SBSQ HOSP IP/OBS HIGH 50: CPT | Performed by: STUDENT IN AN ORGANIZED HEALTH CARE EDUCATION/TRAINING PROGRAM

## 2021-01-01 PROCEDURE — 71260 CT THORAX DX C+: CPT | Performed by: CLINICAL NURSE SPECIALIST

## 2021-01-01 PROCEDURE — 85610 PROTHROMBIN TIME: CPT | Performed by: RADIOLOGY

## 2021-01-01 PROCEDURE — 47000 NEEDLE BIOPSY OF LIVER PERQ: CPT | Performed by: INTERNAL MEDICINE

## 2021-01-01 PROCEDURE — 88341 IMHCHEM/IMCYTCHM EA ADD ANTB: CPT | Performed by: INTERNAL MEDICINE

## 2021-01-01 PROCEDURE — 82607 VITAMIN B-12: CPT

## 2021-01-01 PROCEDURE — 70553 MRI BRAIN STEM W/O & W/DYE: CPT | Performed by: INTERNAL MEDICINE

## 2021-01-01 PROCEDURE — 74177 CT ABD & PELVIS W/CONTRAST: CPT | Performed by: EMERGENCY MEDICINE

## 2021-01-01 PROCEDURE — 71260 CT THORAX DX C+: CPT | Performed by: INTERNAL MEDICINE

## 2021-01-01 PROCEDURE — 76536 US EXAM OF HEAD AND NECK: CPT | Performed by: INTERNAL MEDICINE

## 2021-01-01 PROCEDURE — 82746 ASSAY OF FOLIC ACID SERUM: CPT

## 2021-01-01 PROCEDURE — 81301 MICROSATELLITE INSTABILITY: CPT | Performed by: INTERNAL MEDICINE

## 2021-01-01 PROCEDURE — 0002A SARSCOV2 VAC 30MCG/0.3ML IM: CPT

## 2021-01-01 PROCEDURE — 88307 TISSUE EXAM BY PATHOLOGIST: CPT | Performed by: INTERNAL MEDICINE

## 2021-01-01 PROCEDURE — 99233 SBSQ HOSP IP/OBS HIGH 50: CPT | Performed by: INTERNAL MEDICINE

## 2021-01-01 RX ORDER — FLUOROURACIL 50 MG/ML
400 INJECTION, SOLUTION INTRAVENOUS ONCE
Status: COMPLETED | OUTPATIENT
Start: 2021-01-01 | End: 2021-01-01

## 2021-01-01 RX ORDER — FLUOROURACIL 50 MG/ML
320 INJECTION, SOLUTION INTRAVENOUS ONCE
Status: CANCELLED | OUTPATIENT
Start: 2021-01-01

## 2021-01-01 RX ORDER — ATROPINE SULFATE 0.4 MG/ML
0.2 AMPUL (ML) INJECTION ONCE
Status: COMPLETED | OUTPATIENT
Start: 2021-01-01 | End: 2021-01-01

## 2021-01-01 RX ORDER — FLUOROURACIL 50 MG/ML
2400 INJECTION, SOLUTION INTRAVENOUS CONTINUOUS
Status: DISCONTINUED | OUTPATIENT
Start: 2021-01-01 | End: 2021-01-01

## 2021-01-01 RX ORDER — ATROPINE SULFATE 0.4 MG/ML
0.2 AMPUL (ML) INJECTION ONCE
Status: CANCELLED | OUTPATIENT
Start: 2021-01-01

## 2021-01-01 RX ORDER — ONDANSETRON 2 MG/ML
8 INJECTION INTRAMUSCULAR; INTRAVENOUS EVERY 6 HOURS PRN
Status: DISCONTINUED | OUTPATIENT
Start: 2021-01-01 | End: 2021-01-01

## 2021-01-01 RX ORDER — FLUOROURACIL 50 MG/ML
1920 INJECTION, SOLUTION INTRAVENOUS CONTINUOUS
Status: DISCONTINUED | OUTPATIENT
Start: 2021-01-01 | End: 2021-01-01

## 2021-01-01 RX ORDER — GABAPENTIN 300 MG/1
300 CAPSULE ORAL NIGHTLY
Qty: 30 CAPSULE | Refills: 5 | Status: SHIPPED | OUTPATIENT
Start: 2021-01-01 | End: 2021-01-01

## 2021-01-01 RX ORDER — SODIUM CHLORIDE 0.9 % (FLUSH) 0.9 %
10 SYRINGE (ML) INJECTION ONCE
Status: CANCELLED | OUTPATIENT
Start: 2021-01-01

## 2021-01-01 RX ORDER — FLUOROURACIL 50 MG/ML
2400 INJECTION, SOLUTION INTRAVENOUS CONTINUOUS
Status: CANCELLED | OUTPATIENT
Start: 2021-01-01

## 2021-01-01 RX ORDER — SODIUM PHOSPHATE, DIBASIC AND SODIUM PHOSPHATE, MONOBASIC 7; 19 G/133ML; G/133ML
1 ENEMA RECTAL ONCE AS NEEDED
Status: DISCONTINUED | OUTPATIENT
Start: 2021-01-01 | End: 2021-11-18

## 2021-01-01 RX ORDER — SODIUM CHLORIDE 9 MG/ML
INJECTION, SOLUTION INTRAVENOUS CONTINUOUS
Status: DISCONTINUED | OUTPATIENT
Start: 2021-01-01 | End: 2021-01-01

## 2021-01-01 RX ORDER — HALOPERIDOL 5 MG/ML
2 INJECTION INTRAMUSCULAR
Status: DISCONTINUED | OUTPATIENT
Start: 2021-01-01 | End: 2021-11-18

## 2021-01-01 RX ORDER — FLUOROURACIL 50 MG/ML
400 INJECTION, SOLUTION INTRAVENOUS ONCE
Status: CANCELLED | OUTPATIENT
Start: 2021-01-01

## 2021-01-01 RX ORDER — GABAPENTIN 300 MG/1
300 CAPSULE ORAL 3 TIMES DAILY
Qty: 90 CAPSULE | Refills: 0 | Status: ON HOLD | OUTPATIENT
Start: 2021-01-01 | End: 2021-01-01

## 2021-01-01 RX ORDER — GABAPENTIN 300 MG/1
CAPSULE ORAL
Qty: 150 CAPSULE | Refills: 2 | Status: SHIPPED | OUTPATIENT
Start: 2021-01-01 | End: 2021-01-01

## 2021-01-01 RX ORDER — ONDANSETRON 2 MG/ML
4 INJECTION INTRAMUSCULAR; INTRAVENOUS EVERY 4 HOURS PRN
Status: DISCONTINUED | OUTPATIENT
Start: 2021-01-01 | End: 2021-01-01

## 2021-01-01 RX ORDER — SODIUM CHLORIDE 9 MG/ML
INJECTION, SOLUTION INTRAVENOUS CONTINUOUS
Status: CANCELLED
Start: 2021-01-01

## 2021-01-01 RX ORDER — SODIUM CHLORIDE 0.9 % (FLUSH) 0.9 %
10 SYRINGE (ML) INJECTION ONCE
Status: COMPLETED | OUTPATIENT
Start: 2021-01-01 | End: 2021-01-01

## 2021-01-01 RX ORDER — LORAZEPAM 2 MG/ML
1 INJECTION INTRAMUSCULAR EVERY 4 HOURS PRN
Status: DISCONTINUED | OUTPATIENT
Start: 2021-01-01 | End: 2021-11-18

## 2021-01-01 RX ORDER — FLUOROURACIL 50 MG/ML
1920 INJECTION, SOLUTION INTRAVENOUS CONTINUOUS
Status: CANCELLED | OUTPATIENT
Start: 2021-01-01

## 2021-01-01 RX ORDER — FLUOROURACIL 50 MG/ML
320 INJECTION, SOLUTION INTRAVENOUS ONCE
Status: COMPLETED | OUTPATIENT
Start: 2021-01-01 | End: 2021-01-01

## 2021-01-01 RX ORDER — HEPARIN SODIUM 10000 [USP'U]/100ML
18 INJECTION, SOLUTION INTRAVENOUS CONTINUOUS
Status: DISPENSED | OUTPATIENT
Start: 2021-01-01 | End: 2021-01-01

## 2021-01-01 RX ORDER — ATROPINE SULFATE 10 MG/ML
2 SOLUTION/ DROPS OPHTHALMIC EVERY 2 HOUR PRN
Status: DISCONTINUED | OUTPATIENT
Start: 2021-01-01 | End: 2021-11-18

## 2021-01-01 RX ORDER — ONDANSETRON 2 MG/ML
8 INJECTION INTRAMUSCULAR; INTRAVENOUS EVERY 6 HOURS PRN
Status: DISCONTINUED | OUTPATIENT
Start: 2021-01-01 | End: 2021-11-18

## 2021-01-01 RX ORDER — ONDANSETRON 2 MG/ML
4 INJECTION INTRAMUSCULAR; INTRAVENOUS EVERY 6 HOURS PRN
Status: DISCONTINUED | OUTPATIENT
Start: 2021-01-01 | End: 2021-11-18

## 2021-01-01 RX ORDER — GABAPENTIN 300 MG/1
300 CAPSULE ORAL 3 TIMES DAILY
Status: DISCONTINUED | OUTPATIENT
Start: 2021-01-01 | End: 2021-01-01

## 2021-01-01 RX ORDER — GLYCOPYRROLATE 0.2 MG/ML
0.4 INJECTION, SOLUTION INTRAMUSCULAR; INTRAVENOUS
Status: DISCONTINUED | OUTPATIENT
Start: 2021-01-01 | End: 2021-11-18

## 2021-01-01 RX ORDER — LIDOCAINE 50 MG/G
2 PATCH TOPICAL EVERY 24 HOURS
Status: ON HOLD | COMMUNITY
End: 2021-01-01

## 2021-01-01 RX ORDER — VANCOMYCIN 2 GRAM/500 ML IN 0.9 % SODIUM CHLORIDE INTRAVENOUS
25 ONCE
Status: COMPLETED | OUTPATIENT
Start: 2021-01-01 | End: 2021-01-01

## 2021-01-01 RX ORDER — POLYETHYLENE GLYCOL 3350 17 G/17G
17 POWDER, FOR SOLUTION ORAL DAILY PRN
Status: DISCONTINUED | OUTPATIENT
Start: 2021-01-01 | End: 2021-01-01

## 2021-01-01 RX ORDER — FLUCONAZOLE 100 MG/1
TABLET ORAL
Qty: 8 TABLET | Refills: 0 | Status: SHIPPED | OUTPATIENT
Start: 2021-01-01 | End: 2021-01-01 | Stop reason: ALTCHOICE

## 2021-01-01 RX ORDER — ACETAMINOPHEN 325 MG/1
650 TABLET ORAL EVERY 6 HOURS PRN
Status: DISCONTINUED | OUTPATIENT
Start: 2021-01-01 | End: 2021-01-01

## 2021-01-01 RX ORDER — GABAPENTIN 300 MG/1
CAPSULE ORAL
Qty: 120 CAPSULE | Refills: 5 | Status: SHIPPED | OUTPATIENT
Start: 2021-01-01 | End: 2021-01-01

## 2021-01-01 RX ORDER — ACETAMINOPHEN 325 MG/1
650 TABLET ORAL EVERY 4 HOURS PRN
Status: DISCONTINUED | OUTPATIENT
Start: 2021-01-01 | End: 2021-01-01

## 2021-01-01 RX ORDER — LORAZEPAM 2 MG/ML
0.5 INJECTION INTRAMUSCULAR EVERY 4 HOURS PRN
Status: DISCONTINUED | OUTPATIENT
Start: 2021-01-01 | End: 2021-11-18

## 2021-01-01 RX ORDER — CIPROFLOXACIN 500 MG/1
500 TABLET, FILM COATED ORAL ONCE
Status: COMPLETED | OUTPATIENT
Start: 2021-01-01 | End: 2021-01-01

## 2021-01-01 RX ORDER — HYDROCODONE BITARTRATE AND ACETAMINOPHEN 5; 325 MG/1; MG/1
1 TABLET ORAL EVERY 4 HOURS PRN
Status: DISCONTINUED | OUTPATIENT
Start: 2021-01-01 | End: 2021-01-01

## 2021-01-01 RX ORDER — HYDROCODONE BITARTRATE AND ACETAMINOPHEN 5; 325 MG/1; MG/1
2 TABLET ORAL EVERY 4 HOURS PRN
Status: DISCONTINUED | OUTPATIENT
Start: 2021-01-01 | End: 2021-01-01

## 2021-01-01 RX ORDER — CEPHALEXIN 500 MG/1
500 CAPSULE ORAL 2 TIMES DAILY
Qty: 14 CAPSULE | Refills: 0 | Status: SHIPPED | OUTPATIENT
Start: 2021-01-01 | End: 2021-01-01

## 2021-01-01 RX ORDER — HYDROMORPHONE HYDROCHLORIDE 1 MG/ML
0.2 INJECTION, SOLUTION INTRAMUSCULAR; INTRAVENOUS; SUBCUTANEOUS EVERY 2 HOUR PRN
Status: DISCONTINUED | OUTPATIENT
Start: 2021-01-01 | End: 2021-01-01

## 2021-01-01 RX ORDER — MELATONIN
3 NIGHTLY PRN
Status: DISCONTINUED | OUTPATIENT
Start: 2021-01-01 | End: 2021-01-01

## 2021-01-01 RX ORDER — SODIUM CHLORIDE 9 MG/ML
INJECTION, SOLUTION INTRAVENOUS ONCE
Status: COMPLETED | OUTPATIENT
Start: 2021-01-01 | End: 2021-01-01

## 2021-01-01 RX ORDER — DOXEPIN HYDROCHLORIDE 50 MG/1
1 CAPSULE ORAL DAILY
Status: DISCONTINUED | OUTPATIENT
Start: 2021-01-01 | End: 2021-01-01

## 2021-01-01 RX ORDER — MIDAZOLAM HYDROCHLORIDE 1 MG/ML
INJECTION INTRAMUSCULAR; INTRAVENOUS
Status: DISPENSED
Start: 2021-01-01 | End: 2021-01-01

## 2021-01-01 RX ORDER — BISACODYL 10 MG
10 SUPPOSITORY, RECTAL RECTAL
Status: DISCONTINUED | OUTPATIENT
Start: 2021-01-01 | End: 2021-11-18

## 2021-01-01 RX ORDER — SCOLOPAMINE TRANSDERMAL SYSTEM 1 MG/1
1 PATCH, EXTENDED RELEASE TRANSDERMAL
Status: DISCONTINUED | OUTPATIENT
Start: 2021-01-01 | End: 2021-11-18

## 2021-01-01 RX ORDER — FLUMAZENIL 0.1 MG/ML
0.2 INJECTION, SOLUTION INTRAVENOUS AS NEEDED
Status: DISCONTINUED | OUTPATIENT
Start: 2021-01-01 | End: 2021-01-01

## 2021-01-01 RX ORDER — FLUMAZENIL 0.1 MG/ML
INJECTION, SOLUTION INTRAVENOUS
Status: DISCONTINUED
Start: 2021-01-01 | End: 2021-01-01 | Stop reason: WASHOUT

## 2021-01-01 RX ORDER — NALOXONE HYDROCHLORIDE 0.4 MG/ML
80 INJECTION, SOLUTION INTRAMUSCULAR; INTRAVENOUS; SUBCUTANEOUS AS NEEDED
Status: DISCONTINUED | OUTPATIENT
Start: 2021-01-01 | End: 2021-01-01

## 2021-01-01 RX ORDER — MORPHINE SULFATE 2 MG/ML
1 INJECTION, SOLUTION INTRAMUSCULAR; INTRAVENOUS
Status: DISCONTINUED | OUTPATIENT
Start: 2021-01-01 | End: 2021-11-18

## 2021-01-01 RX ORDER — METOCLOPRAMIDE HYDROCHLORIDE 5 MG/ML
10 INJECTION INTRAMUSCULAR; INTRAVENOUS EVERY 8 HOURS PRN
Status: DISCONTINUED | OUTPATIENT
Start: 2021-01-01 | End: 2021-01-01

## 2021-01-01 RX ORDER — ONDANSETRON 4 MG/1
4 TABLET, ORALLY DISINTEGRATING ORAL EVERY 6 HOURS PRN
Status: DISCONTINUED | OUTPATIENT
Start: 2021-01-01 | End: 2021-11-18

## 2021-01-01 RX ORDER — GABAPENTIN 300 MG/1
300 CAPSULE ORAL 3 TIMES DAILY
Qty: 90 CAPSULE | Refills: 5 | Status: SHIPPED | OUTPATIENT
Start: 2021-01-01 | End: 2021-01-01

## 2021-01-01 RX ORDER — HALOPERIDOL 5 MG/ML
1 INJECTION INTRAMUSCULAR
Status: DISCONTINUED | OUTPATIENT
Start: 2021-01-01 | End: 2021-11-18

## 2021-01-01 RX ORDER — LORAZEPAM 2 MG/ML
2 INJECTION INTRAMUSCULAR EVERY 4 HOURS PRN
Status: DISCONTINUED | OUTPATIENT
Start: 2021-01-01 | End: 2021-11-18

## 2021-01-01 RX ORDER — FUROSEMIDE 40 MG/1
40 TABLET ORAL EVERY 8 HOURS PRN
Status: DISCONTINUED | OUTPATIENT
Start: 2021-01-01 | End: 2021-11-18

## 2021-01-01 RX ORDER — METOCLOPRAMIDE HYDROCHLORIDE 5 MG/ML
5 INJECTION INTRAMUSCULAR; INTRAVENOUS EVERY 8 HOURS PRN
Status: DISCONTINUED | OUTPATIENT
Start: 2021-01-01 | End: 2021-01-01

## 2021-01-01 RX ORDER — GABAPENTIN 300 MG/1
600 CAPSULE ORAL NIGHTLY
Qty: 60 CAPSULE | Refills: 5 | Status: SHIPPED | OUTPATIENT
Start: 2021-01-01 | End: 2021-01-01

## 2021-01-01 RX ORDER — PROCHLORPERAZINE MALEATE 10 MG
10 TABLET ORAL EVERY 4 HOURS PRN
Status: DISCONTINUED | OUTPATIENT
Start: 2021-01-01 | End: 2021-11-18

## 2021-01-01 RX ORDER — FUROSEMIDE 10 MG/ML
40 INJECTION INTRAMUSCULAR; INTRAVENOUS EVERY 8 HOURS PRN
Status: DISCONTINUED | OUTPATIENT
Start: 2021-01-01 | End: 2021-11-18

## 2021-01-01 RX ORDER — ACETAMINOPHEN 325 MG/1
650 TABLET ORAL EVERY 6 HOURS PRN
Status: CANCELLED | OUTPATIENT
Start: 2021-01-01

## 2021-01-01 RX ORDER — CIPROFLOXACIN 500 MG/1
500 TABLET, FILM COATED ORAL 2 TIMES DAILY
Qty: 8 TABLET | Refills: 0 | Status: SHIPPED | OUTPATIENT
Start: 2021-01-01 | End: 2021-01-01

## 2021-01-01 RX ORDER — PROCHLORPERAZINE 25 MG
25 SUPPOSITORY, RECTAL RECTAL EVERY 6 HOURS PRN
Status: DISCONTINUED | OUTPATIENT
Start: 2021-01-01 | End: 2021-11-18

## 2021-01-01 RX ORDER — ONDANSETRON 2 MG/ML
4 INJECTION INTRAMUSCULAR; INTRAVENOUS EVERY 6 HOURS PRN
Status: DISCONTINUED | OUTPATIENT
Start: 2021-01-01 | End: 2021-01-01

## 2021-01-01 RX ORDER — NALOXONE HYDROCHLORIDE 0.4 MG/ML
INJECTION, SOLUTION INTRAMUSCULAR; INTRAVENOUS; SUBCUTANEOUS
Status: DISCONTINUED
Start: 2021-01-01 | End: 2021-01-01 | Stop reason: WASHOUT

## 2021-01-01 RX ORDER — ACETAMINOPHEN 160 MG/5ML
650 SOLUTION ORAL EVERY 4 HOURS PRN
Status: DISCONTINUED | OUTPATIENT
Start: 2021-01-01 | End: 2021-11-18

## 2021-01-01 RX ORDER — ONDANSETRON 2 MG/ML
8 INJECTION INTRAMUSCULAR; INTRAVENOUS EVERY 6 HOURS PRN
Status: CANCELLED | OUTPATIENT
Start: 2021-01-01

## 2021-01-01 RX ORDER — BISACODYL 10 MG
10 SUPPOSITORY, RECTAL RECTAL
Status: DISCONTINUED | OUTPATIENT
Start: 2021-01-01 | End: 2021-01-01

## 2021-01-01 RX ORDER — TRAMADOL HYDROCHLORIDE 50 MG/1
50 TABLET ORAL EVERY 6 HOURS PRN
Status: DISCONTINUED | OUTPATIENT
Start: 2021-01-01 | End: 2021-01-01

## 2021-01-01 RX ORDER — MIDAZOLAM HYDROCHLORIDE 1 MG/ML
1 INJECTION INTRAMUSCULAR; INTRAVENOUS EVERY 5 MIN PRN
Status: ACTIVE | OUTPATIENT
Start: 2021-01-01 | End: 2021-01-01

## 2021-01-01 RX ORDER — SENNOSIDES 8.6 MG
8.6 TABLET ORAL DAILY
Status: DISCONTINUED | OUTPATIENT
Start: 2021-01-01 | End: 2021-01-01

## 2021-01-01 RX ORDER — ACETAMINOPHEN 650 MG/1
650 SUPPOSITORY RECTAL EVERY 4 HOURS PRN
Status: DISCONTINUED | OUTPATIENT
Start: 2021-01-01 | End: 2021-11-18

## 2021-01-01 RX ORDER — HEPARIN SODIUM AND DEXTROSE 10000; 5 [USP'U]/100ML; G/100ML
INJECTION INTRAVENOUS CONTINUOUS
Status: DISCONTINUED | OUTPATIENT
Start: 2021-01-01 | End: 2021-01-01

## 2021-01-01 RX ADMIN — FLUOROURACIL 3300 MG: 50 INJECTION, SOLUTION INTRAVENOUS at 12:13:00

## 2021-01-01 RX ADMIN — ATROPINE SULFATE 0.2 MG: 0.4 MG/ML AMPUL (ML) INJECTION at 11:27:00

## 2021-01-01 RX ADMIN — FLUOROURACIL 550 MG: 50 INJECTION, SOLUTION INTRAVENOUS at 16:49:00

## 2021-01-01 RX ADMIN — FLUOROURACIL 4100 MG: 50 INJECTION, SOLUTION INTRAVENOUS at 12:06:00

## 2021-01-01 RX ADMIN — FLUOROURACIL 4100 MG: 50 INJECTION, SOLUTION INTRAVENOUS at 12:57:00

## 2021-01-01 RX ADMIN — ATROPINE SULFATE 0.2 MG: 0.4 MG/ML AMPUL (ML) INJECTION at 10:28:00

## 2021-01-01 RX ADMIN — SODIUM CHLORIDE: 9 INJECTION, SOLUTION INTRAVENOUS at 13:22:00

## 2021-01-01 RX ADMIN — FLUOROURACIL 4100 MG: 50 INJECTION, SOLUTION INTRAVENOUS at 11:16:00

## 2021-01-01 RX ADMIN — ATROPINE SULFATE 0.2 MG: 0.4 MG/ML AMPUL (ML) INJECTION at 11:30:00

## 2021-01-01 RX ADMIN — FLUOROURACIL 4150 MG: 50 INJECTION, SOLUTION INTRAVENOUS at 12:17:00

## 2021-01-01 RX ADMIN — MIDAZOLAM HYDROCHLORIDE 1 MG: 1 INJECTION INTRAMUSCULAR; INTRAVENOUS at 11:33:00

## 2021-01-01 RX ADMIN — ATROPINE SULFATE 0.2 MG: 0.4 MG/ML AMPUL (ML) INJECTION at 11:53:00

## 2021-01-01 RX ADMIN — ATROPINE SULFATE 0.2 MG: 0.4 MG/ML AMPUL (ML) INJECTION at 10:38:00

## 2021-01-01 RX ADMIN — ATROPINE SULFATE 0.2 MG: 0.4 MG/ML AMPUL (ML) INJECTION at 12:12:00

## 2021-01-01 RX ADMIN — ATROPINE SULFATE 0.2 MG: 0.4 MG/ML AMPUL (ML) INJECTION at 09:26:00

## 2021-01-01 RX ADMIN — ATROPINE SULFATE 0.2 MG: 0.4 MG/ML AMPUL (ML) INJECTION at 09:43:00

## 2021-01-01 RX ADMIN — FLUOROURACIL 550 MG: 50 INJECTION, SOLUTION INTRAVENOUS at 12:08:00

## 2021-01-01 RX ADMIN — ATROPINE SULFATE 0.2 MG: 0.4 MG/ML AMPUL (ML) INJECTION at 10:08:00

## 2021-01-01 RX ADMIN — SODIUM CHLORIDE 0.9 % (FLUSH) 10 ML: 0.9 % SYRINGE (ML) INJECTION at 11:59:00

## 2021-01-01 RX ADMIN — FLUOROURACIL 700 MG: 50 INJECTION, SOLUTION INTRAVENOUS at 12:00:00

## 2021-01-01 RX ADMIN — FLUOROURACIL 700 MG: 50 INJECTION, SOLUTION INTRAVENOUS at 11:37:00

## 2021-01-01 RX ADMIN — SODIUM CHLORIDE 0.9 % (FLUSH) 10 ML: 0.9 % SYRINGE (ML) INJECTION at 10:35:00

## 2021-01-01 RX ADMIN — FLUOROURACIL 700 MG: 50 INJECTION, SOLUTION INTRAVENOUS at 13:33:00

## 2021-01-01 RX ADMIN — FLUOROURACIL 4150 MG: 50 INJECTION, SOLUTION INTRAVENOUS at 13:22:00

## 2021-01-01 RX ADMIN — SODIUM CHLORIDE 0.9 % (FLUSH) 10 ML: 0.9 % SYRINGE (ML) INJECTION at 10:40:00

## 2021-01-01 RX ADMIN — SODIUM CHLORIDE 0.9 % (FLUSH) 10 ML: 0.9 % SYRINGE (ML) INJECTION at 11:50:00

## 2021-01-01 RX ADMIN — FLUOROURACIL 700 MG: 50 INJECTION, SOLUTION INTRAVENOUS at 11:11:00

## 2021-01-01 RX ADMIN — ATROPINE SULFATE 0.2 MG: 0.4 MG/ML AMPUL (ML) INJECTION at 11:03:00

## 2021-01-01 RX ADMIN — FLUOROURACIL 4100 MG: 50 INJECTION, SOLUTION INTRAVENOUS at 12:43:00

## 2021-01-01 RX ADMIN — FLUOROURACIL 4100 MG: 50 INJECTION, SOLUTION INTRAVENOUS at 13:28:00

## 2021-01-01 RX ADMIN — FLUOROURACIL 700 MG: 50 INJECTION, SOLUTION INTRAVENOUS at 12:10:00

## 2021-01-01 RX ADMIN — FLUOROURACIL 700 MG: 50 INJECTION, SOLUTION INTRAVENOUS at 12:50:00

## 2021-01-01 RX ADMIN — FLUOROURACIL 550 MG: 50 INJECTION, SOLUTION INTRAVENOUS at 13:20:00

## 2021-01-01 RX ADMIN — FLUOROURACIL 700 MG: 50 INJECTION, SOLUTION INTRAVENOUS at 12:36:00

## 2021-01-01 RX ADMIN — FLUOROURACIL 4150 MG: 50 INJECTION, SOLUTION INTRAVENOUS at 11:55:00

## 2021-01-01 RX ADMIN — ATROPINE SULFATE 0.2 MG: 0.4 MG/ML AMPUL (ML) INJECTION at 10:04:00

## 2021-01-01 RX ADMIN — FLUOROURACIL 4100 MG: 50 INJECTION, SOLUTION INTRAVENOUS at 11:44:00

## 2021-01-01 RX ADMIN — SODIUM CHLORIDE 0.9 % (FLUSH) 10 ML: 0.9 % SYRINGE (ML) INJECTION at 15:19:00

## 2021-01-01 RX ADMIN — FLUOROURACIL 700 MG: 50 INJECTION, SOLUTION INTRAVENOUS at 11:50:00

## 2021-01-01 RX ADMIN — FLUOROURACIL 4150 MG: 50 INJECTION, SOLUTION INTRAVENOUS at 13:41:00

## 2021-01-01 RX ADMIN — FLUOROURACIL 700 MG: 50 INJECTION, SOLUTION INTRAVENOUS at 13:21:00

## 2021-01-01 RX ADMIN — FLUOROURACIL 4100 MG: 50 INJECTION, SOLUTION INTRAVENOUS at 13:26:00

## 2021-01-01 RX ADMIN — FLUOROURACIL 700 MG: 50 INJECTION, SOLUTION INTRAVENOUS at 13:16:00

## 2021-01-01 RX ADMIN — FLUOROURACIL 700 MG: 50 INJECTION, SOLUTION INTRAVENOUS at 13:22:00

## 2021-01-01 RX ADMIN — FLUOROURACIL 3300 MG: 50 INJECTION, SOLUTION INTRAVENOUS at 12:38:00

## 2021-01-01 RX ADMIN — ATROPINE SULFATE 0.2 MG: 0.4 MG/ML AMPUL (ML) INJECTION at 11:35:00

## 2021-01-01 RX ADMIN — FLUOROURACIL 3300 MG: 50 INJECTION, SOLUTION INTRAVENOUS at 13:25:00

## 2021-01-01 RX ADMIN — FLUOROURACIL 550 MG: 50 INJECTION, SOLUTION INTRAVENOUS at 12:26:00

## 2021-01-01 RX ADMIN — SODIUM CHLORIDE: 9 INJECTION, SOLUTION INTRAVENOUS at 11:46:00

## 2021-01-01 RX ADMIN — ATROPINE SULFATE 0.2 MG: 0.4 MG/ML AMPUL (ML) INJECTION at 11:16:00

## 2021-01-01 RX ADMIN — FLUOROURACIL 700 MG: 50 INJECTION, SOLUTION INTRAVENOUS at 14:03:00

## 2021-01-01 RX ADMIN — FLUOROURACIL 3300 MG: 50 INJECTION, SOLUTION INTRAVENOUS at 16:49:00

## 2021-01-01 RX ADMIN — FLUOROURACIL 4100 MG: 50 INJECTION, SOLUTION INTRAVENOUS at 14:08:00

## 2021-01-04 NOTE — PROCEDURES
[facility]  Procedure Note    Michael Arevalo Patient Status:  Outpatient    1945 MRN [id number]   Location [address] [place] [address] Attending Kp Payne MD   Hosp Day # 0 PCP Clifford Brock MD     Procedure: US guided liver biopsy    Pr

## 2021-01-04 NOTE — IMAGING NOTE
NPO status verified  Pertinent labs and radiology imaging reviewed  Consent signed and on file    Patient tolerated procedural sedation without any immediate complications noted. Biopsy site to liver with tegaderm dressing to right mid abdomen. C/D/I.  Ruthie Beam

## 2021-01-04 NOTE — PRE-SEDATION ASSESSMENT
[facility]  IR Pre-Procedure Sedation Assessment    H&P in progress note dated 12/30/20 reviewed, no changes    History of snoring or sleep or apnea?    No    History of previous problems with anesthesia or sedation  No    Physical Findings:  Neck: nl Spoke with patient, new Rx sent to try to get her Ventolin (blue inhaler). She will contact back if there are issues.

## 2021-01-06 NOTE — PROGRESS NOTES
[facility] Progress Note    Patient Name: Oliver Cheney   YOB: 1945   Medical Record Number: [id number]   CSN: [id number]   Attending Physician: Jesusita Pablo M.D.    Referring Physician: Bernardo Murillo MD      Date of Visit: 1/6/2021 cyst showed rare atypical cells.       - started gemcitabine with cisplatin 7/15/20 (as back then was unclear whether had pancreatic primary)      - MSI stable and no  mutation seen through [facility]/  Tempus:  Somatic - Potentially Actionable Variant Pathologist:           Maru Garner MD                                                         Specimen:    Liver, 3 18 g cores right liver mass                                                       Final Diagnosis:   Needle core biopsy of right li performed on formalin-fixed, paraffin-embedded tissue sections. Deparaffinization, antigen retrieval, and staining utilizes the automated Leica Dodd III immunohistochemistry platform.   A proprietary, non-biotin, polymer-based detection system (Bond Polyme 180 mg by mouth daily.   , Disp: , Rfl:   •  CALCIUM + D OR, 1 tablet daily, Disp: , Rfl:   •  MULTIVITAMINS OR TABS, 1 TABLET DAILY, Disp: , Rfl:     Past Medical History:  Past Medical History:   Diagnosis Date   • Allergic rhinitis    • BRCA1 negative 20 file.    Psychosocial History:  Social History    Socioeconomic History      Marital status:       Spouse name: Not on file      Number of children: 2      Years of education: Not on file      Highest education level: Not on file    Occupational His golfs                          Allergies:    Penicillins             RASH  Flonase [Fluticason*    RASH    Comment:Red face, looked like a burn     Review of Systems:  A 14-point ROS was done with pertinent positives and negative per the HPI    Vital Signs LYMABS 0.82 (L) 12/30/2020    MOABSO 0.83 12/30/2020    EOABSO 0.19 12/30/2020    BAABSO 0.07 12/30/2020     Lab Results   Component Value Date    GLU 98 12/30/2020    BUN 26 (H) 12/30/2020    BUNCREA 19.5 12/30/2020    CREATSERUM 1.33 (H) 12/30/2020    AN AORTA:  No aneurysm or dissection. VASCULATURE:  No visible pulmonary arterial thrombus or attenuation.        ABDOMEN/PELVIS:  LIVER:  Within the dome of the liver there is a decreased size of a hypoenhancing lesion now measuring 28 x 16 mm that previ degenerative changes of the thoracic and lumbar spine with multilevel facet joint degenerative changes of the lumbar spine. There is grade 1 anterolisthesis of L4 on L5.   No suspicious lytic or   blastic lesion.                   =====  CONCLUSION: appear   unchanged. These may be postinflammatory/infectious in nature, as a calcified granuloma is seen at the right base. Stable metastases are also considered. These measure up to 0.4 cm. No new pulmonary nodules or infiltrates.     MEDIASTINUM:  Sta WALL:  Stable. No hernia. URINARY BLADDER:  Stable. Nondistended. PELVIC NODES:  Stable. None enlarged. PELVIC ORGANS:  Stable. Surgically absent uterus. BONES:  Stable.   Severe degenerative changes, most pronounced lower thoracic discs and lumbar previous VTE then developed reactive thrombocytosis from chemo. Back to normal     6. H/o ovarian and endometrial cancer- was SEPIDEH. 7. Decreased white count- from chemo. Back to normal    8. Anemia- from chemotherapy- adequate and stable     9.  Macrocy

## 2021-01-07 NOTE — TELEPHONE ENCOUNTER
Toxicities: C1 D1 Fluorouracil/Leucovorin/Oxaliplatin on 1/6/2021    Jorge Masters reports she is feeling well. No side effects so far. I encouraged her to please call the office if she is not feeling well or has any questions or concerns.  She agreed and thanked archie

## 2021-01-08 NOTE — PROGRESS NOTES
Education Record    Learner:  Patient    Disease / Liv Rico d/loy    Barriers / Limitations:  None   Comments:    Method:  Discussion   Comments:    General Topics:  Procedure and Plan of care reviewed   Comments:    Outcome:  Shows understanding   Com

## 2021-01-26 NOTE — PROGRESS NOTES
[facility] Progress Note    Patient Name: Charles Groves   YOB: 1945   Medical Record Number: [id number]   CSN: [id number]   Attending Physician: Breana Cosme M.D.    Referring Physician: Darren Ga MD      Date of Visit: 1/20/2021 aspiration of the cyst showed rare atypical cells.       - started gemcitabine with cisplatin 7/15/20 (as back then was unclear whether had pancreatic primary)      - MSI stable and no  mutation seen through [facility]/  [facility]:  Somatic - Potentially A fevers or recent infections. Denies pain, nausea or vomiting. Current Medications:    Current Outpatient Medications:   •  Rivaroxaban (XARELTO) 20 MG Oral Tab, Take 1 tablet (20 mg total) by mouth daily with food. , Disp: 90 tablet, Rfl: 1  •  traMAD 7/17/2013    Performed by Gautam Johansen MD at [facility] MAIN OR   • COLONOSCOPY  2011`    polyp/repeat 3 years   • COLONOSCOPY N/A 11/16/2016    Performed by Nirmal Carpenter MD at [place] [address] Left     detached retina repair   • HYSTERECTO Not on file        Relationship status: Not on file      Intimate partner violence        Fear of current or ex partner: Not on file        Emotionally abused: Not on file        Physically abused: Not on file        Forced sexual activity: Not on file 5.74 01/20/2021    NEUTABS 2.95 08/12/2020    LYMPHABS 1.25 08/12/2020    EOSABS 0.00 08/12/2020    BASABS 0.05 08/12/2020    NEUT 59 08/12/2020    LYMPH 25 08/12/2020    MON 11 08/12/2020    EOS 0 08/12/2020    BASO 1 08/12/2020    NEPERCENT 69.1 01/20/20 Decreased size of left lower lobe pulmonary nodule measuring 7 x 6 mm where it previously measured (13 x 11 mm). This calcified granuloma in the right lower lobe.   Tiny ground-glass opacity within the left upper lobe posteriorly measures 4 mm   and is sta tissue opacity adjacent to the cecum is approximately 11 x 12 mm which is stable since previous study could represent area of treated disease or scar. No visible mass, obstruction, or bowel wall thickening. ABDOMINAL WALL:  No mass or hernia.     [place] [facility] NRDR ([address] [place] [address] Rd) which   includes the Dose Index Registry. PATIENT STATED HISTORY:(As transcribed by Technologist)  History of biliary tract cancer.       CONTRAST USED:  90cc of Omnipaque 350     FINDINGS:       CHEST:    LUNGS: cm.   KIDNEYS:  Stable. Normal anatomic positions. No new hydronephrosis or perinephric stranding. Likely a few scattered bilateral cysts. ADRENALS:  Stable. Not enlarged. AORTA:  Stable. No abdominal aortic aneurysm. Aortoiliac tortuosity.   RETROP 7. Decreased white count- from chemo. Normal today    8. Anemia- from chemotherapy- adequate and improved from previous    9. Macrocytosis- likely chemo effect with reticulocytosis. B12/folate and TSH/T4 ok. 10. Tinnitus- from previous cisplatin.  Kasi Chester

## 2021-02-03 NOTE — PROGRESS NOTES
[facility] Progress Note    Patient Name: Ollie Mercer   YOB: 1945   Medical Record Number: [id number]   CSN: [id number]   Attending Physician: Claribel Cortés M.D.    Referring Physician: Kris Carney MD      Date of Visit: 2/3/2021 CA 19.9 was >154,000. Cytology from aspiration of the cyst showed rare atypical cells.       - started gemcitabine with cisplatin 7/15/20 (as back then was unclear whether had pancreatic primary)      - MSI stable and no  mutation seen through [facility] No nausea or vomiting. Constipation relieved by meds. Has some mild lower pelvic pain when she \"bears down\". Does not take anything for this. No bleeding. Denies  chest pain, change in urinary habits, headaches,  or visual symptoms.  No fevers or recent Saphenofemoral venous reflux 2016       Past Surgical History:  Past Surgical History:   Procedure Laterality Date   •       x 2   • CATARACT Bilateral     IOL's   • CATHETER REMOVAL N/A 2013    Performed by Kaley Gilman MD at [facility] Not on file        Gets together: Not on file        Attends Restorationist service: Not on file        Active member of club or organization: Not on file        Attends meetings of clubs or organizations: Not on file        Relationship status: Not on file R  Neurological: Grossly intact. Lymphatics: There is no palpable lymphadenopathy  Psych/Depression: Mood and affect are appropriate.     Laboratory:  Recent Results (from the past 24 hour(s))   CARBOHYDRATE ANTIGEN 19-9    Collection Time: 02/03/21  8:11 68.6 %    Lymphocyte % 13.8 %    Monocyte % 11.6 %    Eosinophil % 4.7 %    Basophil % 1.1 %    Immature Granulocyte % 0.2 %         Radiology:  PROCEDURE:  CT CHEST+ABDOMEN+PELVIS(ALL CNTRST ONLY)(CPT=71260/19751)     COMPARISON:  [facility] , CT, CT CHEST+AB previously measured (31 x 30 mm). There is a new mass lesion within segment 7 of the liver which is hypoenhancing measuring 28 x 28 mm. There is a new mass lesion within segment 6 of the liver measuring 18 x 21 mm. BILIARY:  Cholelithiasis.   No ducta liver.     3. Stable soft tissue density adjacent to the cecum may represent treated disease. 4.  Cholelithiasis. 5. Slightly smaller cystic mass involving the tail of the pancreas.        6. There is a new hypo enhancing mass within the superior sp Corresponding descending diameter 2.5 cm. VASCULATURE:  No visible pulmonary arterial thrombus or attenuation. ABDOMEN/PELVIS:  LIVER:  Numerous masses are seen in the liver consistent with metastases. All have decreased in size.   Dome lesion 3.6 lobe is smaller. 3. Improving hepatic metastases. 4. Cystic mass at the pancreatic tail is smaller. 5. Stable soft tissue involving the lateral cecum. 6. Details as above. Continued clinical correlation recommended.           Dictated by (CST): Jose Pierson

## 2021-02-17 NOTE — PROGRESS NOTES
Pt here for C 4 D 1 FOLFOX.   Arrives Ambulating independently, accompanied by Self           Patient reports possible pregnancy since last therapy cycle: Not Applicable    Modifications in dose or schedule: No     Frequency of blood return and site check t

## 2021-02-17 NOTE — PROGRESS NOTES
[facility] Progress Note    Patient Name: Ahmet Peacock   YOB: 1945   Medical Record Number: [id number]   CSN: [id number]   Date of visit: 2/17/2021  Provider: ENA Mg  Referring Physician: Shanelle Moyer    Problem List: showed a short segmental thrombus in the right popliteal vein.         Before she was switched to a DOAC biopsy of one of the liver lesions was arranged as well as aspiration of the cystic lesion to see if this would help with her pain.  These were performe chemotherapy with carboplatin and paclitaxel.     3. PE/right DVT- diagnosed 6/2020 (see 1). On Xarelto    Interval Events:  77year old  patient of Dr. Jerod Mixon  who presents for C4 FOLFOX. She continues to have some orthostatic symptoms.   Drinking 2 qu MULTIVITAMINS OR TABS, 1 TABLET DAILY, Disp: , Rfl:   •  Ondansetron HCl (ZOFRAN) 8 MG tablet, Take 1 tablet (8 mg total) by mouth every 8 (eight) hours as needed for Nausea.  (Patient not taking: Reported on 2/17/2021 ), Disp: 30 tablet, Rfl: 3  •  Janay g/dL    A/G Ratio 0.8 (L) 1.0 - 2.0    FASTING Patient not present    CBC W/ DIFFERENTIAL    Collection Time: 02/17/21 11:02 AM   Result Value Ref Range    WBC 6.6 4.0 - 11.0 x10(3) uL    RBC 2.89 (L) 3.80 - 5.30 x10(6)uL    HGB 9.6 (L) 12.0 - 16.0 g/dL carcinoma:  Proceed with C4 FOLFOX. Noting rise in CA 19-9. Check CT chest/abd/pelvis. L flank pain:  Check UA. Suspect musculoskeletal but will plan scan. Check UA reflex to culture. Anemia:  Due to chemo. Adequate for treatment.   David continue t

## 2021-02-17 NOTE — PROGRESS NOTES
Patient presents with: Follow - Up: APN assessment prior to treatment    Pt is here for treatment; C4 D1 folfox is expected.  Pt states that her last cycle of treatment has had her feeling more uneasy with general malaise; reports \"not as many good days\"

## 2021-02-18 NOTE — TELEPHONE ENCOUNTER
Telephone call to patient - notified. CT order is in our system - she is having the scan at [facility]. Instructed she can call Central Scheduling to schedule appointment phone number given.  Patient will aslo  the oral contrast as she will be here keri

## 2021-02-18 NOTE — TELEPHONE ENCOUNTER
Good Morning, patient called and stated that she was told by the doctor that she needs a CT Scan. She will be here at the hospital tomorrow, 2/19/21 to disconnect her pump.  She would like to know if she could  the Order and liquid she has to drink s

## 2021-02-19 NOTE — TELEPHONE ENCOUNTER
Group B strep in urine. Obtained due to L flank pain which she still has on intermittent basis. No dysuria. No fever. No hematuria. She has PCN allergy-history of rash. Never been rechallenged. D/w Dr. Dawood Garner. Will treat with Keflex 500mg BID.  Pt

## 2021-02-19 NOTE — PROGRESS NOTES
Education Record    Learner:  Patient    Disease / Diagnosis: pump DC    Barriers / Limitations:  None   Comments:    Method:  Discussion   Comments:    General Topics:  Plan of care reviewed   Comments:    Outcome:  Shows understanding   Comments:

## 2021-03-03 NOTE — PROGRESS NOTES
[facility] Progress Note    Patient Name: Tanisha Elliott   YOB: 1945   Medical Record Number: [id number]   CSN: [id number]   Attending Physician: Isabel Horta M.D.    Referring Physician:    Date of Visit: 3/3/2021     Chief Complaint:  P cells.      - started gemcitabine with cisplatin 7/15/20 (as back then was unclear whether had pancreatic primary)      - MSI stable and no  mutation seen through [facility]  [facility]:  Somatic - Potentially Actionable Variant Allele Fraction  p.G12V Mis Denies  chest pain, change in urinary habits, headaches,  or visual symptoms. No fevers or recent infections.        Current Medications:    Current Outpatient Medications:   •  Rivaroxaban (XARELTO) 20 MG Oral Tab, Take 1 tablet (20 mg total) by mouth [facility]ward Bilateral     IOL's   • CATHETER REMOVAL N/A 7/17/2013    Performed by Alexandre Sánchez MD at [address] [facility] OR   • COLONOSCOPY  2011`    polyp/repeat 3 years   • COLONOSCOPY N/A 11/16/2016    Performed by Fernando Bowden MD at [facility] Attends meetings of clubs or organizations: Not on file        Relationship status: Not on file      Intimate partner violence        Fear of current or ex partner: Not on file        Emotionally abused: Not on file        Physically abused: Not on file the past 24 hour(s))   COMP METABOLIC PANEL (14)    Collection Time: 03/03/21  8:54 AM   Result Value Ref Range    Glucose 110 (H) 70 - 99 mg/dL    Sodium 139 136 - 145 mmol/L    Potassium 4.2 3.5 - 5.1 mmol/L    Chloride 106 98 - 112 mmol/L    CO2 23.0 21 Radiology:  PROCEDURE:  CT CHEST+ABDOMEN+PELVIS(ALL CNTRST ONLY)(CPT=71260/90629)     COMPARISON:  [facility] , CT, CT CHEST+ABDOMEN+PELVIS(ALL CNTRST ONLY)(CPT=71260/22363), 12/29/2020, 1:35 PM.     INDICATIONS:  R10.9 Left flank pain C80.1 Pancreatobil Cholelithiasis. No visible dilatation. PANCREAS:  Cystic mass involving the tail extending into the splenic hilum measures 5.4 x 6.2 versus 5.1 x 6.4 cm.   Adjacent posterior more solid component is larger in size measuring approximately 3.2 x 5 versus the chest, abdomen, and pelvis was performed. Dose reduction techniques were used. Dose information is transmitted to the [facility]) NRDR ([address]) which   includes the Dose Index Registry.      PATIENT STA smaller than the previous study and measures 64 x 51 mm were previously   measured (72 x 60 mm). SPLEEN:  New hypo enhancing lesion within the superior spleen measures 22 x 15 mm could represent a metastatic lesion.   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  Discussed switching her reg

## 2021-03-03 NOTE — PROGRESS NOTES
Pt here for C1D1 FOLFIRI.   Arrives Ambulating independently, accompanied by Spouse           Patient reports possible pregnancy since last therapy cycle: No    Modifications in dose or schedule: Yes Irinotecan at a reduced dose     Frequency of blood retur

## 2021-03-04 NOTE — TELEPHONE ENCOUNTER
Toxicities:  C1 D1 Fluorouracil/Leucovorin/Irinotecan on 3/3/2021    Parisa Tobias is feeling good today. No side effects at this time. I encouraged her to please call the office if she is not feeling well or she has any questions or concerns.  She thanked me for c

## 2021-03-05 NOTE — PROGRESS NOTES
Education Record    Learner:  Patient    Disease / Rose whitaker    Barriers / Limitations:  None   Comments:    Method:  Reinforcement   Comments:    General Topics:  Procedure   Comments:    Outcome:  Shows understanding   Comments:

## 2021-03-17 NOTE — PROGRESS NOTES
[facility] Progress Note    Patient Name: Michael Arevalo   YOB: 1945   Medical Record Number: [id number]   CSN: [id number]   Attending Physician: Jeane Valdivia M.D.    Referring Physician: Clifford Brock MD      Date of Visit: 3/17/2021 pancreatico-biliary primary. CA 19.9 was >154,000. Cytology from aspiration of the cyst showed rare atypical cells.       - started gemcitabine with cisplatin 7/15/20 (as back then was unclear whether had pancreatic primary)      - MSI stable and no  urinary habits, headaches or visual symptoms. No fevers or recent infections. Current Medications:    Current Outpatient Medications:   •  Rivaroxaban (XARELTO) 20 MG Oral Tab, Take 1 tablet (20 mg total) by mouth daily with food. , Disp: 90 tablet, R REMOVAL N/A 7/17/2013    Performed by Kevyn Ramirez MD at [facility] MAIN OR   • COLONOSCOPY  2011`    polyp/repeat 3 years   • COLONOSCOPY N/A 11/16/2016    Performed by Jaimee Perez MD at [facility] ENDOSCOPY   • EYE SURGERY Left     detached retina repair Health  Financial Resource Strain:       Difficulty of Paying Living Expenses:   Food Insecurity:       Worried About [address] in the Last Year:       Ran Out of Food in the Last Year:   Transportation Needs:       Lack of Transportation ([facility] the past 24 hour(s))   CBC W/ DIFFERENTIAL    Collection Time: 03/17/21  8:27 AM   Result Value Ref Range    WBC 4.1 4.0 - 11.0 x10(3) uL    RBC 2.75 (L) 3.80 - 5.30 x10(6)uL    HGB 9.0 (L) 12.0 - 16.0 g/dL    HCT 28.2 (L) 35.0 - 48.0 %    .0 150.0 pelvis was performed. Dose reduction techniques were used. Dose information is transmitted to the ACR [facility] [employer] [facility] Radiology) NRDR ([address]) which   includes the Dose Index Registry.      PATIENT STATED HISTORY:(As transcri low-density mass measures 9.6 versus 9.1 mm. No enlargement. Intrasplenic calcifications consistent with prior granulomatous disease. KIDNEYS:  No mass, obstruction, or calcification. Right extrarenal pelvis. ADRENALS:  No mass or enlargement.     AORT Disease surveillance. No new symptoms. CONTRAST USED:  88cc of Omnipaque 350     FINDINGS:       CHEST:    LUNGS:  Decreased size of left lower lobe pulmonary nodule measuring 7 x 6 mm where it previously measured (13 x 11 mm).   This calcified granul enlargement. AORTA:  No aneurysm or dissection. RETROPERITONEUM:  No mass or adenopathy.     BOWEL/MESENTERY:  Soft tissue opacity adjacent to the cecum is approximately 11 x 12 mm which is stable since previous study could represent area of treated d on Xarelto given metastatic disease. No PE described in recent CT. 3. Right leg swelling- from DVT but also has a h/o lymphedema of this leg previously. Wearing compression stockings. At baseline    4.  Pelvic pain- she reports this has resolved which i

## 2021-03-31 NOTE — PROGRESS NOTES
[facility] Progress Note    Patient Name: Perla Doe   YOB: 1945   Medical Record Number: [id number]   CSN: [id number]   Attending Physician: Anat Bronosn M.D.    Referring Physician: Meta Opitz, MD      Date of Visit: 3/31/2021 19.9 was >154,000. Cytology from aspiration of the cyst showed rare atypical cells.       - started gemcitabine with cisplatin 7/15/20 (as back then was unclear whether had pancreatic primary)      - MSI stable and no  mutation seen through [employer] fingers. Appetite good. She denies pain. No bleeding. Denies  chest pain, change in urinary habits, headaches or visual symptoms. No fevers or recent infections. Diarrhea controlled.        Current Medications:    Current Outpatient Medications:   •  gabap Pulmonary embolism ([facility] [address]    • Saphenofemoral venous reflux 2016       Past Surgical History:  Past Surgical History:   Procedure Laterality Date   •       x 2   • CATARACT Bilateral     IOL's   • CATHETER REMOVAL N/A 2013    Performed by 8/08 (168), 7/09 (138)      Colon: declined 11/06, 11/07, 12/08, 10/09, 10/10      H/O: 10/06 (not returned)            calcium: takes MVI      exercise: walks/ golfs                      Social Determinants of Health  Financial Resource Strain:       Diff sounds. Extremities: Pedal pulses are present. Has compression stockings on- R>L. Lymphedema R  Neurological: Grossly intact. Lymphatics: There is no palpable lymphadenopathy  Psych/Depression: Mood and affect are appropriate.     Laboratory:  Recent Res Absolute 0.06 0.00 - 0.20 x10(3) uL    Immature Granulocyte Absolute 0.03 0.00 - 1.00 x10(3) uL    Neutrophil % 52.0 %    Lymphocyte % 24.6 %    Monocyte % 17.9 %    Eosinophil % 3.1 %    Basophil % 1.6 %    Immature Granulocyte % 0.8 %     Lab Results   C nodule within the inferior right thyroid lobe. ADE:  No mass or adenopathy. CARDIAC:  No enlargement, pericardial thickening, or significant calcification. PLEURA:  No mass or effusion. CHEST WALL:  No mass or axillary adenopathy.   Right [place] BONES:  Stable including diffuse degenerative change. No bony lesion or fracture.                     =====  CONCLUSION:    1. Interval increase in size as well as new intrahepatic lesions.   Interval increase in size of pancreatic tail mass predominantl arterial thrombus or attenuation. ABDOMEN/PELVIS:  LIVER:  Within the dome of the liver there is a decreased size of a hypoenhancing lesion now measuring 28 x 16 mm that previously measured (36 x 24 mm.   Immediately adjacent to this nodule is a 4 mm joint degenerative changes of the lumbar spine. There is grade 1 anterolisthesis of L4 on L5. No suspicious lytic or   blastic lesion.                   =====  CONCLUSION:       1.  Decreased size of left lower lobe pulmonary nodule consistent with favora discussed trial of Gabapentin wanted to see how she does. Agreed to try this last visit with improvement. She will increase her dose to 300 BID or 600 mg nightly (if makes her drowsy)     10. Diarrhea- expected from regimen. Better controlled.      11. Subc

## 2021-03-31 NOTE — PROGRESS NOTES
Pt here for C3D1.   Arrives Ambulating independently, accompanied by Self           Pregnancy screening: Not applicable    Modifications in dose or schedule: No     Frequency of blood return and site check throughout administration: Prior to administration,

## 2021-04-14 NOTE — PROGRESS NOTES
Pt here for C4D1 FOLFIRI.   Arrives Ambulating independently, accompanied by Self           Pregnancy screening: Denies possibility of pregnancy    Modifications in dose or schedule: No     Frequency of blood return and site check throughout administration:

## 2021-04-14 NOTE — PROGRESS NOTES
[facility] Progress Note    Patient Name: Chavo Dorsey   YOB: 1945   Medical Record Number: [id number]   CSN: [id number]   Attending Physician: Angela Whitt M.D.    Referring Physician: Jenny Lara MD      Date of Visit: 4/14/2021 19.9 was >154,000. Cytology from aspiration of the cyst showed rare atypical cells.       - started gemcitabine with cisplatin 7/15/20 (as back then was unclear whether had pancreatic primary)      - MSI stable and no  mutation seen through [employer] good. She denies pain. No bleeding. Denies  chest pain, change in urinary habits, headaches or visual symptoms. No fevers or recent infections. No diarrhea and actually had some constipation after chemo and had to take bowel regimen.  No further abdominope history of antineoplastic chemotherapy    • Pneumonia due to organism    • Pulmonary embolism [facility])    • Saphenofemoral venous reflux 2016       Past Surgical History:  Past Surgical History:   Procedure Laterality Date   •       x 2   • C 10/10      DEXA: declined 11/06, 11/07, 11/08, 10/09, 10/10      Cholesterol: 8/08 (168), 7/09 (138)      Colon: declined 11/06, 11/07, 12/08, 10/09, 10/10      H/O: 10/06 (not returned)            calcium: takes MVI      exercise: walks/ golfs Lymphatics: There is no palpable lymphadenopathy  Psych/Depression: Mood and affect are appropriate.     Laboratory:  Lab Results   Component Value Date    WBC 4.2 04/14/2021    RBC 2.85 (L) 04/14/2021    HGB 9.4 (L) 04/14/2021    HCT 30.0 (L) 04/14/2021 the chest, abdomen, and pelvis was performed. Dose reduction techniques were used. Dose information is transmitted to the ACR [address] [place] [address] of Radiology) NRDR ([address]) which   includes the Dose Index Registry.      PATIENT STA SPLEEN:  Intrasplenic low-density mass measures 9.6 versus 9.1 mm. No enlargement. Intrasplenic calcifications consistent with prior granulomatous disease. KIDNEYS:  No mass, obstruction, or calcification. Right extrarenal pelvis.   ADRENALS:  No mass cancer and is on chemo. Disease surveillance. No new symptoms.       CONTRAST USED:  88cc of Omnipaque 350     FINDINGS:       CHEST:    LUNGS:  Decreased size of left lower lobe pulmonary nodule measuring 7 x 6 mm where it previously measured (13 x 11 mm ADRENALS:  No mass or enlargement. AORTA:  No aneurysm or dissection. RETROPERITONEUM:  No mass or adenopathy.     BOWEL/MESENTERY:  Soft tissue opacity adjacent to the cecum is approximately 11 x 12 mm which is stable since previous study could rep have resolved. She will continue on Xarelto given metastatic disease. No PE described in recent CT. 3. Right leg swelling- from DVT but also has a h/o lymphedema of this leg previously. Wearing compression stockings. At baseline    4.  Pelvic pain- reso

## 2021-04-28 NOTE — PROGRESS NOTES
[facility] Progress Note    Patient Name: Ahmet Peacock   YOB: 1945   Medical Record Number: [id number]   CSN: [id number]   Attending Physician: Shanelle Moyer M.D.    Referring Physician: Sangita Osorio MD      Date of Visit: 4/28/2021 c/w pancreatico-biliary primary. CA 19.9 was >154,000. Cytology from aspiration of the cyst showed rare atypical cells.       - started gemcitabine with cisplatin 7/15/20 (as back then was unclear whether had pancreatic primary)      - MSI stable and no dri pelvic area yesterday. Some blood when she blows her nose but no bleeding elsewhere. Denies  chest pain, change in urinary habits, headaches or visual symptoms. No fevers or recent infections.  No diarrhea but does have some constipation and on bowel regime carcinoma of the ovary Stage IIA,  Rx carboplatin and taxol,   =5.0 (9/09)Dr. Garrison Freed   • Personal history of antineoplastic chemotherapy 2009   • Pneumonia due to organism    • [facility]    • Saphenofemoral venous reflux 2/1/2016 10/10      Breast exam: 12/08, 10/09, declined 10/10      DEXA: declined 11/06, 11/07, 11/08, 10/09, 10/10      Cholesterol: 8/08 (168), 7/09 (138)      Colon: declined 11/06, 11/07, 12/08, 10/09, 10/10      H/O: 10/06 (not returned)            calcium: ta Clear to auscultation. Heart: Regular rate and rhythm. Abdomen: Soft, non tender with good bowel sounds. Extremities: Pedal pulses are present. Has compression stockings on. Lymphedema R>L  Neurological: Grossly intact. Lymphatics:  There is no palpab 0. 68 0.10 - 1.00 x10(3) uL    Eosinophil Absolute 0.47 0.00 - 0.70 x10(3) uL    Basophil Absolute 0.06 0.00 - 0.20 x10(3) uL    Immature Granulocyte Absolute 0.01 0.00 - 1.00 x10(3) uL    Neutrophil % 50.9 %    Lymphocyte % 21.9 %    Monocyte % 15.2 %    E significant calcification. PLEURA:  No mass or effusion. CHEST WALL:  No mass or axillary adenopathy. Right Port-A-Cath. AORTA:  No aneurysm or dissection. VASCULATURE:  No visible pulmonary arterial thrombus or attenuation.        ABDOMEN/PELVIS: increase in size as well as new intrahepatic lesions. Interval increase in size of pancreatic tail mass predominantly solid component extending and possibly invading the spleen. 2. Continued decreased size of left lower lobe pulmonary nodule.   3. Soft ti lesion now measuring 28 x 16 mm that previously measured (36 x 24 mm.   Immediately adjacent to this nodule is a 4 mm nodule by 3 mm that used to measure 5 x 5 mm     There is also decreased size of a mass which is hypoenhancing involving the lateral segmen =====  CONCLUSION:       1. Decreased size of left lower lobe pulmonary nodule consistent with favorable treatment response of malignancy      2.  Mixed response within the liver with some lesions responding to treatment and decreasing in si regimen to keep her regular    11. Thyroid nodules- seen on previous CT. US showed 3 nodules and would continue to monitor. Cycle 5 FOLFIRI today. As long as markers coming down will hold off on imaging    Labs reviewed.      Risk level high- metastat

## 2021-04-28 NOTE — PROGRESS NOTES
Pt here for C5D1 FOLFIRI.   Arrives Ambulating independently, accompanied by Self           Pregnancy screening: Denies possibility of pregnancy    Modifications in dose or schedule: No     Frequency of blood return and site check throughout administration:

## 2021-04-30 NOTE — PROGRESS NOTES
Education Record    Learner:  Patient    Disease / Diagnosis: biliary tract cancer    Barriers / Limitations:  None   Comments:    Method:  Brief focused   Comments:    General Topics:  Plan of care reviewed   Comments:    Outcome:  Shows understanding   C

## 2021-05-12 NOTE — PROGRESS NOTES
[facility] Progress Note    Patient Name: Brsyon Osorio   YOB: 1945   Medical Record Number: [id number]   CSN: [id number]   Attending Physician: Brett Payne M.D.    Referring Physician: Fanta Gunter MD      Date of Visit: 5/12/2021 19.9 was >154,000. Cytology from aspiration of the cyst showed rare atypical cells.       - started gemcitabine with cisplatin 7/15/20 (as back then was unclear whether had pancreatic primary)      - MSI stable and no  mutation seen through [employer] she walks. No pain. Denies  chest pain, change in urinary habits, headaches or visual symptoms. No fevers or recent infections. No diarrhea but does have some constipation and on bowel regimen to keep her regular. Tinnitus the same.          Current Medicat antineoplastic chemotherapy    • Pneumonia due to organism    • Pulmonary embolism [facility])    • Saphenofemoral venous reflux 2016       Past Surgical History:  Past Surgical History:   Procedure Laterality Date   •       x 2   • CATARACT Luis Cholesterol: 8/08 (168), 7/09 (138)      Colon: declined 11/06, 11/07, 12/08, 10/09, 10/10      H/O: 10/06 (not returned)            calcium: takes MVI      exercise: walks/ golfs                      Social Determinants of [facility] Pedal pulses are present. Has compression stockings on. Lymphedema R>L  Neurological: Grossly intact. Lymphatics: There is no palpable lymphadenopathy  Psych/Depression: Mood and affect are appropriate.     Laboratory:  Lab Results   Component Value Date TECHNIQUE:  IV contrast-enhanced scanning through the chest, abdomen, and pelvis was performed. Dose reduction techniques were used.  Dose information is transmitted to the [facility] ([facility] [employer] of Radiology) Meryl Ricardo [address] ([address]) which to the   splenic hilum possibly invading the spleen. SPLEEN:  Intrasplenic low-density mass measures 9.6 versus 9.1 mm. No enlargement. Intrasplenic calcifications consistent with prior granulomatous disease.   KIDNEYS:  No mass, obstruction, or calcif by Technologist)  Patient has history of Pancreatic cancer and is on chemo. Disease surveillance. No new symptoms.       CONTRAST USED:  88cc of Omnipaque 350     FINDINGS:       CHEST:    LUNGS:  Decreased size of left lower lobe pulmonary nodule measuri KIDNEYS:  No mass, obstruction, or calcification. ADRENALS:  No mass or enlargement. AORTA:  No aneurysm or dissection. RETROPERITONEUM:  No mass or adenopathy.     BOWEL/MESENTERY:  Soft tissue opacity adjacent to the cecum is approximately 11 x 2. PE/DVT- on Xarelto. Progressively worse bleeding episodes on this- epistaxis and hematochezia. . Previous PE resolved on recent CT but has continued on anticoagulation given metastatic disease.  Discussed decreasing her dose of Xarelto to 10 and also

## 2021-05-12 NOTE — PROGRESS NOTES
Pt here for C6D1 FOLFIRI.   Arrives Ambulating independently, accompanied by Self           Pregnancy screening: Not applicable    Modifications in dose or schedule: No     Frequency of blood return and site check throughout administration: Prior to adminis

## 2021-05-26 NOTE — PROGRESS NOTES
Pt here for C7D1 folfiri.   Arrives Ambulating independently, accompanied by Self           Pregnancy screening: Denies possibility of pregnancy    Modifications in dose or schedule: No     Frequency of blood return and site check throughout administration:

## 2021-05-26 NOTE — PROGRESS NOTES
[facility] Progress Note    Patient Name: Irineo Stein   YOB: 1945   Medical Record Number: [id number]   CSN: [id number]   Attending Physician: Josue Brewster M.D.    Referring Physician: Mirna Arroyo MD      Date of Visit: 5/26/2021 CA 19.9 was >154,000. Cytology from aspiration of the cyst showed rare atypical cells.       - started gemcitabine with cisplatin 7/15/20 (as back then was unclear whether had pancreatic primary)      - MSI stable and no  mutation seen through [facility] or visual symptoms. No fevers or recent infections. Tinnitus the same. Constipated. Current Medications:    Current Outpatient Medications:   •  rivaroxaban (XARELTO) 10 MG Oral Tab, Take 1 tablet (10 mg total) by mouth daily with food. , Disp: 30 tab Pneumonia due to organism    • Pulmonary embolism [facility])    • Saphenofemoral venous reflux 2016       Past Surgical History:  Past Surgical History:   Procedure Laterality Date   •       x 2   • CATARACT Bilateral     IOL's   • COLONOSCOPY  201 Colon: declined 11/06, 11/07, 12/08, 10/09, 10/10      H/O: 10/06 (not returned)            calcium: takes MVI      exercise: walks/ golfs                      Social Determinants of Health  Financial Resource Strain:       Difficulty of Paying Living Exp stockings on. Lymphedema R>L  Neurological: Grossly intact. Lymphatics: There is no palpable lymphadenopathy  Psych/Depression: Mood and affect are appropriate.     Laboratory:  Lab Results   Component Value Date    WBC 3.5 (L) 05/12/2021    RBC 2.92 (L) TECHNIQUE:  Real time, grey scale, and duplex ultrasound was used to evaluate the lower extremity venous system.  B-mode two-dimensional images of the vascular structures, Doppler spectral analysis, and color flow.  Doppler imaging were performed. Praveena Tavarez image number 75.  Calcified granulomata.  2 mm left lower lobe nodule image number 96. 2 mm left lower lobe nodule image number 76. 2 mm left lower lobe nodule on image number 72. 4 mm   left upper lobe nodule on image number 47. 3 mm left upper lobe nodule 4.6 x 5.7 cm.  Decrease in size of pancreatic mass within the tail of the pancreas now measuring 3.1 x 2.4 cm and previously measured   4.9 x 3.5 cm.  Question of possible invasion into the spleen.     ADRENALS:  Normal.  No mass or enlargement.     KIDNEY Registry. PATIENT STATED HISTORY:(As transcribed by Technologist)  Patient has panceatobiliary- type carcinoma.       CONTRAST USED:  86cc of Omnipaque 350     FINDINGS:       CHEST:    LUNGS:  Continued decreased size of left lower lobe subpleural 4 x pelvis. ADRENALS:  No mass or enlargement. AORTA:  No aneurysm or dissection. RETROPERITONEUM:  No mass or adenopathy. BOWEL/MESENTERY:  Normal caliber small and large bowel.   Previously noted 12 mm soft tissue density adjacent to the cecum is no previous cisplatin. Stable. Had discussed seeing audiology/ENT. As she says she is used to this some she would like to defer for now    8. CKD- improved     9. Neuropathy- had underlying from previous Taxol (for Gyne ca in 2009).  Worse on Oxaliplatin which

## 2021-05-28 NOTE — PROGRESS NOTES
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

## 2021-06-09 NOTE — PROGRESS NOTES
Pt here for C8D1 FOLFIRI. Arrives Ambulating independently, accompanied by Self           Pregnancy screening: Not applicable    Modifications in dose or schedule: Yes - patient will be taking a chemo break. Return in 3 weeks for labs and MD only.

## 2021-06-09 NOTE — PROGRESS NOTES
[facility] Progress Note    Patient Name: Kadie Nina   YOB: 1945   Medical Record Number: [id number]   CSN: [id number]   Attending Physician: Aye Martinez M.D.    Referring Physician: Isiah Guaman MD      Date of Visit: 6/9/2021 aspiration of the cyst showed rare atypical cells.       - started gemcitabine with cisplatin 7/15/20 (as back then was unclear whether had pancreatic primary)      - MSI stable and no  mutation seen through [facility]  [employer]:  Somatic - Potentially Ac symptoms. No fevers or recent infections. Tinnitus the same. Constipated. Current Medications:    Current Outpatient Medications:   •  rivaroxaban (XARELTO) 10 MG Oral Tab, Take 1 tablet (10 mg total) by mouth daily with food. , Disp: 90 tablet, Rfl: History:   Procedure Laterality Date   •       x 2   • CATARACT Bilateral     IOL's   • COLONOSCOPY  `    polyp/repeat 3 years   • COLONOSCOPY N/A 2016    Procedure: COLONOSCOPY;  Surgeon:  Brenda Andrade MD;  Location: [facility] ENDOSCOPY Social Determinants of Health  Financial Resource Strain:       Difficulty of Paying Living Expenses:   Food Insecurity:       Worried About Running Out of Food in the Last Year:       Ran Out of Food in the Last Year:   Transportation Ne and affect are appropriate.     Laboratory:  Lab Results   Component Value Date    WBC 3.3 (L) 06/09/2021    RBC 2.87 (L) 06/09/2021    HGB 10.0 (L) 06/09/2021    HCT 30.1 (L) 06/09/2021    .0 06/09/2021    .9 (H) 06/09/2021    MCH 34.8 (H) 06 scale, and duplex ultrasound was used to evaluate the lower extremity venous system.  B-mode two-dimensional images of the vascular structures, Doppler spectral analysis, and color flow.  Doppler imaging were performed.  The   following veins were imaged bi granulomata.  2 mm left lower lobe nodule image number 96. 2 mm left lower lobe nodule image number 76. 2 mm left lower lobe nodule on image number 72. 4 mm   left upper lobe nodule on image number 47. 3 mm left upper lobe nodule on image 18. 2 mm left upp size of pancreatic mass within the tail of the pancreas now measuring 3.1 x 2.4 cm and previously measured   4.9 x 3.5 cm.  Question of possible invasion into the spleen.     ADRENALS:  Normal.  No mass or enlargement.     KIDNEYS: Frieda Canary are symmetrical HISTORY:(As transcribed by Technologist)  Patient has panceatobiliary- type carcinoma.       CONTRAST USED:  86cc of Omnipaque 350     FINDINGS:       CHEST:    LUNGS:  Continued decreased size of left lower lobe subpleural 4 x 4 vs 6 x 7 mm nodule posterio enlargement. AORTA:  No aneurysm or dissection. RETROPERITONEUM:  No mass or adenopathy. BOWEL/MESENTERY:  Normal caliber small and large bowel. Previously noted 12 mm soft tissue density adjacent to the cecum is not appreciated on today's study. visit.     5. Pancytopenia- from chemotherapy- counts adequate. Hb and platelets actually improved compared to last visit    6. Macrocytosis- likely chemo effect with reticulocytosis. B12/folate and TSH/T4 ok. Slightly improved.      7. Tinnitus- from previ

## 2021-06-30 NOTE — PROGRESS NOTES
[facility] Progress Note    Patient Name: Ahmet Peacock   YOB: 1945   Medical Record Number: [id number]   CSN: [id number]   Attending Physician: Shanelle Moyer M.D.    Referring Physician: Sangita Osorio MD      Date of Visit: 6/30/2021 pancreatic primary)      - MSI stable and no  mutation seen through [employer]  [employer]:  Somatic - Potentially Actionable Variant Allele Fraction  p.G12V Missense variant (exon 2) - GOF 33.3%  p.C242fs Frameshift - LOF 28.9%  p. E545D Miss[employer] variant ( Medications:   •  rivaroxaban (XARELTO) 10 MG Oral Tab, Take 1 tablet (10 mg total) by mouth daily with food. , Disp: 90 tablet, Rfl: 3  •  gabapentin 300 MG Oral Cap, Take 1 tab PO in am and noon and 2 tabs in pm, Disp: 120 capsule, Rfl: 5  •  traMADol HCl polyp/repeat 3 years   • COLONOSCOPY N/A 11/16/2016    Procedure: COLONOSCOPY;  Surgeon:  Krystal Lewis MD;  Location: [address] [place] [address]   • EYE SURGERY Left     detached retina repair   • HYSTERECTOMY  1/09    KEMI/BSO/omentectomy with pelvic and aortic lym Expenses:   Food Insecurity:       Worried About [address] [employer] in the Last Year:       Ran Out of Food in the Last Year:   Transportation Needs:       Lack of Transportation (Medical):       Lack of Transportation (Non-Medical):   Physical Activity: HGB 11.2 (L) 06/30/2021    HCT 34.8 (L) 06/30/2021    .0 06/30/2021    .4 (H) 06/30/2021    MCH 34.9 (H) 06/30/2021    MCHC 32.2 06/30/2021    RDW 17.2 (H) 06/30/2021    NEPRELIM 1.95 06/30/2021    NEUTABS 2.95 08/12/2020    LYMPHABS 1.25 spectral analysis, and color flow.  Doppler imaging were performed.  The   following veins were imaged bilaterally:  Common, deep, and superficial femoral, popliteal, sapheno-femoral junction, and posterior tibial veins.       PATIENT STATED HISTORY: (As t left upper lobe nodule on image number 47. 3 mm left upper lobe nodule on image 18. 2 mm left upper lobe nodule on image 13. 3 mm right upper lobe nodule image 14. 2 mm right upper lobe nodule image number 23. 2 mm right upper lobe nodule image 44. 2 mm into the spleen.     ADRENALS:  Normal.  No mass or enlargement.     KIDNEYS: Peggy Keel are symmetrical in size without evidence of hydronephrosis. BOWEL/MESENTERY:  Bowel is normal in caliber.  No evidence of obstruction.  The appendix is normal in appear LUNGS:  Continued decreased size of left lower lobe subpleural 4 x 4 vs 6 x 7 mm nodule posteriorly. 5 mm pleural based right lower lobe calcified granuloma is stable. Small 3 mm left upper lobe perifissural ground-glass nodule. No new nodules.   MEDIA Previously noted 12 mm soft tissue density adjacent to the cecum is not appreciated on today's study. No free fluid. ABDOMINAL WALL:  No mass or hernia. URINARY BLADDER:  No visible focal wall thickening, lesion, or calculus.     PELVIC NODES:  No ad would like to defer for now    8. CKD- stable    9. Constipation- on a bowel regimen to keep her regular. 10. Thyroid nodules- seen on previous CT. US showed 3 nodules and would continue to monitor.        Had wanted a break from treatment when she coul

## 2021-07-21 NOTE — PROGRESS NOTES
[facility] Progress Note    Patient Name: Sadaf Myers   YOB: 1945   Medical Record Number: [id number]   CSN: [id number]   Attending Physician: Aris Wilson M.D.    Referring Physician: Sarahi Church MD      Date of Visit: 7/21/2021 cyst showed rare atypical cells.       - started gemcitabine with cisplatin 7/15/20 (as back then was unclear whether had pancreatic primary)      - MSI stable and no  mutation seen through [employer]  [employer]:  Somatic - Potentially Actionable Variant A urinary habits, headaches or visual symptoms. No fevers or recent infections. .Does have constipation and takes Miralax.        Current Medications:    Current Outpatient Medications:   •  fluconazole 100 MG Oral Tab, Two tablets on the first day and then o chemotherapy    • Pneumonia due to organism    • Pulmonary embolism [facility])    • Saphenofemoral venous reflux 2016       Past Surgical History:  Past Surgical History:   Procedure Laterality Date   •       x 2   • CATARACT Bilateral     IOL' (168), 7/09 (138)      Colon: declined 11/06, 11/07, 12/08, 10/09, 10/10      H/O: 10/06 (not returned)            calcium: takes MVI      exercise: walks/ golfs                      Social Determinants of Health  Financial Resource Strain:       Difficult compression stockings on. Lymphedema R>L  Neurological: Grossly intact. Lymphatics: There is no palpable lymphadenopathy  Psych/Depression: Mood and affect are appropriate.     Laboratory:  Lab Results   Component Value Date    WBC 6.0 07/21/2021    RBC 3  M12.025 Acute deep vein thrombosis (DVT) of right popliteal vein (HCC) C80.1 Pancreatobiliary-type carcinoma (HCC)       TECHNIQUE:  Real time, grey scale, and duplex ultrasound was used to evaluate the lower extremity venous system.  B-mode two-dimensiona upper lobe nodule on image number 54. 2 mm right   upper lobe nodule image number 57. 3 mm right middle lobe nodule on image number 75.  Calcified granulomata.  2 mm left lower lobe nodule image number 96. 2 mm left lower lobe nodule image number 76. 2 mm l PANCREAS:  No ev-pancreatic inflammatory stranding.  Stable cystic lesion within the tail of the pancreas measuring 4.6 x 5.7 cm.  Decrease in size of pancreatic mass within the tail of the pancreas now measuring 3.1 x 2.4 cm and previously measured the ACR ([employer]) NRDR ([address]) which   includes the Dose Index Registry. PATIENT STATED HISTORY:(As transcribed by Technologist)  Patient has panceatobiliary- type carcinoma.       CONTRAST USED:  86cc of consistent with prior granulomatous disease. KIDNEYS:  No mass, obstruction, or calcification. Right extrarenal pelvis. ADRENALS:  No mass or enlargement. AORTA:  No aneurysm or dissection. RETROPERITONEUM:  No mass or adenopathy.     BOWEL/MESENTE worsened with subsequent cycles. Has improved some off treatment and will continue current dose of gabapentin    5. Pancytopenia- from chemotherapy- counts improved on break. 6. Macrocytosis- likely chemo effect with reticulocytosis.  B12/folate and TSH

## 2021-07-22 NOTE — TELEPHONE ENCOUNTER
Patient is calling to resume her new Chemo and her numbers have went up and she need to start Chemo up again. Patient would like a call back and she need to know if she need to cancel her August appointments as well.

## 2021-08-04 NOTE — PROGRESS NOTES
Pt here for C1D1 liposomal irinotecan.   Arrives Ambulating independently, accompanied by Self           Pregnancy screening: Not applicable    Modifications in dose or schedule: No     Frequency of blood return and site check throughout administration: Gemma

## 2021-08-04 NOTE — PROGRESS NOTES
Patient presents with: Follow - Up: APN assessment prior to treatment    Pt is here for treatment; C1 D1 irontecan is expected. She has had 8 weeks off from treatment and is feeling well today. Denies N,V,D,C, fever/chills. Energy has improved.  Has had in

## 2021-08-05 NOTE — TELEPHONE ENCOUNTER
Vinita Sicard reports that she feels good today. No side effects at this time. I encouraged her to please call the office if she is not feeling well or she has any questions or concerns. She thanked me for checking on her.

## 2021-08-05 NOTE — TELEPHONE ENCOUNTER
Toxicities: C1 D1 Irinotecan/Leucovorin/Fluorouracil on 8/4/2021    I attempted to reach Thalia Rojas to see how she is feeling after her first treatment. No answer. I left a voice mail message to please call the office.

## 2021-08-06 NOTE — TELEPHONE ENCOUNTER
Patient called and was blowing her nose, she accidentally scraped the inside of the nostril with her finger nail. She has been bleeding for at least 1/2 hour. Please call Patient.  [phone]

## 2021-08-06 NOTE — TELEPHONE ENCOUNTER
Alex Flow, no further nose bleed with pressure, cold to area and head back. Reinforced to blow nose gently only when necessary, no picking. She verbalizes understanding.

## 2021-08-06 NOTE — TELEPHONE ENCOUNTER
Returned call to Parisa Tobias, she was blowing her nose and was checking if clean with finger and scraped the inside of the nostril with her finger nail, she has been having bleeding for about 1/2 hour, she also placed some kleenex in nostril.   Instructed to try

## 2021-08-18 NOTE — PROGRESS NOTES
Pt here for C1D1.   Arrives Ambulating independently, accompanied by Self           Pregnancy screening: Not applicable    Modifications in dose or schedule: No     Frequency of blood return and site check throughout administration: Prior to administration,

## 2021-08-18 NOTE — PROGRESS NOTES
[facility] Progress Note    Patient Name: Oliver Cheney   YOB: 1945   Medical Record Number: [id number]   CSN: [id number]   Attending Physician: Jesusita Pablo M.D.    Referring Physician: Bernardo Murillo MD      Date of Visit: 8/18/2021 showed rare atypical cells.       - started gemcitabine with cisplatin 7/15/20 (as back then was unclear whether had pancreatic primary)      - [facility]SI stable and [facility]  [facility]  Somatic - Potentially Actionable Variant Allele watch when take it slow when she walks. No bleeding. Denies chest pain, change in urinary habits, headaches or visual symptoms. No fevers or recent infections.       Current Medications:    Current Outpatient Medications:   •  rivaroxaban (XARELTO) 10 MG O Saphenofemoral venous reflux 2016       Past Surgical History:  Past Surgical History:   Procedure Laterality Date   •       x 2   • CATARACT Bilateral     IOL's   • COLONOSCOPY  `    polyp/repeat 3 years   • COLONOSCOPY N/A 2016 10/06 (not returned)            calcium: takes MVI      exercise: walks/ golfs                      Social Determinants of Health  Financial Resource Strain:       Difficulty of Paying Living Expenses:   Food Insecurity:       Worried About Running Out of There is no palpable lymphadenopathy  Psych/Depression: Mood and affect are appropriate.     Laboratory:  Lab Results   Component Value Date    WBC 7.4 08/18/2021    RBC 2.86 (L) 08/18/2021    HGB 9.5 (L) 08/18/2021    HCT 29.6 (L) 08/18/2021    .0 0 Pancreatobiliary-type carcinoma (HCC)       TECHNIQUE:  Real time, grey scale, and duplex ultrasound was used to evaluate the lower extremity venous system.  B-mode two-dimensional images of the vascular structures, Doppler spectral analysis, and color flow number 57. 3 mm right middle lobe nodule on image number 75.  Calcified granulomata.  2 mm left lower lobe nodule image number 96. 2 mm left lower lobe nodule image number 76. 2 mm left lower lobe nodule on image number 72. 4 mm   left upper lobe nodule on lesion within the tail of the pancreas measuring 4.6 x 5.7 cm.  Decrease in size of pancreatic mass within the tail of the pancreas now measuring 3.1 x 2.4 cm and previously measured   4.9 x 3.5 cm.  Question of possible invasion into the spleen.     ADREN Registry) which   includes the Dose Index Registry. PATIENT STATED HISTORY:(As transcribed by Technologist)  Patient has panceatobiliary- type carcinoma.       CONTRAST USED:  86cc of Omnipaque 350     FINDINGS:       CHEST:    LUNGS:  Continued decreas obstruction, or calcification. Right extrarenal pelvis. ADRENALS:  No mass or enlargement. AORTA:  No aneurysm or dissection. RETROPERITONEUM:  No mass or adenopathy. BOWEL/MESENTERY:  Normal caliber small and large bowel.   Previously noted 12 m previous Taxol (for Gyne ca in 2009). Worse on Oxaliplatin which was stopped but on FOLFIRI has worsened with subsequent cycles. Improved some during break. Will monitor and  continue current dose of gabapentin    5. Diarrhea- from chemotherapy. Resolved.

## 2021-08-19 NOTE — TELEPHONE ENCOUNTER
Toxicities: C1 D1 Fluorouracil/Leucovorin/Irinotecan on 8/18/2021    Aldo Swenson reports that she feels good. No side effects at this time. I encouraged her to call the office if she is not feeling well or has any questions or concerns.  She thanked me for [employer]

## 2021-09-01 NOTE — PROGRESS NOTES
[facility] Progress Note    Patient Name: Ahmet Peacock   YOB: 1945   Medical Record Number: [id number]   CSN: [id number]   Attending Physician: Shanelle Moyer M.D.    Referring Physician: Sangita Osorio MD      Date of Visit: 9/1/2021 from aspiration of the cyst showed rare atypical cells.       - started gemcitabine with cisplatin 7/15/20 (as back then was unclear whether had pancreatic primary)      - MSI stable and no  mutation seen through [facility]  [facility]:  Somatic - Potential Illness:  Did much better on FOLFIRI. Had no diarrhea and had to be on bowel regimen so she was not constipated. Neuropathy in fingers a tad worse. No bleeding. Denies chest pain, change in urinary habits, headaches or visual symptoms.  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 Dustin Diez   • Personal history of antineoplastic chemotherapy 2009   • Pneumonia due to organism    • Pulmonary embolism ([facility]    • Sa Mamm: declined 11/06, 11/07, 11/08, 10/09, 10/10      Breast exam: 12/08, 10/09, declined 10/10      DEXA: declined 11/06, 11/07, 11/08, 10/09, 10/10      Cholesterol: 8/08 (168), 7/09 (138)      Colon: declined 11/06, 11/07, 12/08, 10/09, 10/10      H/O: supple. Chest: Clear to auscultation. Heart: Regular rate and rhythm. Abdomen: Soft, non tender with good bowel sounds. Extremities: Pedal pulses are present. Has compression stockings on. Lymphedema R>L  Neurological: Grossly intact. Lymphatics:  Floreen Babinski CARBOHYDRATE AG 19-9 Latest Ref Range: <=37.0 U/mL 2,194.7 (H) 1,433.8 (H)       Radiology:  PROCEDURE:  US VENOUS DOPPLER LEG BILAT - DIAG IMG (CPT=93970)       COMPARISON:  US LARY, US VENOUS DOPPLER LEG BILAT - DIAG IMG (CPT=93970), 6/25/2020, 2:1      CONTRAST USED:  80cc of Omnipaque 350       FINDINGS:         CHEST:     LUNGS:  Dependent atelectasis bilaterally.  2 mm right upper lobe nodule on image number 34. 3 mm right upper lobe nodule on image number 34. 2 mm right upper lobe nodule on im cm.   BILIARY:  Cholelithiasis.  No gallbladder wall thickening.  There is mild intrahepatic biliary dilatation within the left lobe and a dilated duct near the dome of the liver within the right lobe. .   SPLEEN:  Splenic granulomata.  Stable 1 cm hypodens ONLY)(CPT=71260/44883), 12/29/2020, 1:35 PM.     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   Dose reduction techniques cm.  Adjacent posterior more solid component is larger in size measuring approximately 3.2 x 5 versus 2.6 x 3.8 cm. This extends to the   splenic hilum possibly invading the spleen. SPLEEN:  Intrasplenic low-density mass measures 9.6 versus 9.1 mm.   No DVT have resolved. H/o lymphedema. Wearing compression stockings. R>L. Stable    4. Neuropathy- had underlying from previous Taxol (for Gyne ca in 2009). Worse on Oxaliplatin which was stopped but on FOLFIRI has worsened with subsequent cycles.   Improved s

## 2021-09-02 NOTE — TELEPHONE ENCOUNTER
Patient calling regarding medication     gabapentin 300 MG Oral Cap    She would like to change the dose- as discussed, and will need a new prescription    Plz confirm with Patient

## 2021-09-06 NOTE — PROGRESS NOTES
Pt here for C 5 D 8 Gemzar.   Arrives Ambulating independently, accompanied by Self           Patient reports possible pregnancy since last therapy cycle: Not Applicable    Modifications in dose or schedule: Yes; Gemzar dose reduced due to [address] 0.96     Freq 31.9

## 2021-09-15 NOTE — PROGRESS NOTES
ANP Visit Note    Patient Name: Danielle Martínez   YOB: 1945   Medical Record Number: [id number]   CSN: [id number]   Date of visit: 9/15/2021   Dania Hale MD   No primary care provider on file.      Chief Complaint/Reason for Visit:  Lizzette Bryant unclear whether had pancreatic primary)                    - MSI stable and no  mutation seen through [facility]  [facility]:  Somatic - Potentially Actionable Variant Allele Fraction  p.G12V Missense variant (exon 2) - GOF 33.3%  p.C242fs Frameshift - LOF says she is doing pretty well except she crashes on Fridays. She is asking to do her next treatment on Monday as she has plans for the weekend and she does not want to feel bad. She denies and N/V/D/C. She is eating pretty well. Her bowels are regular. lymphadenectomy   • OTHER  1/2009    staging procedure/ovarian cancer   • PORT, INDWELLING, IMP  Aug/Sept 2020       Allergies:    Penicillins             RASH  Flonase [Fluticason*    RASH    Comment:Red face, looked like a burn    Family History:  Family file  Transportation Needs:       Lack of Transportation (Medical): Not on file      Lack of Transportation (Non-Medical):  Not on file  Physical Activity:       Days of Exercise per Week: Not on file      Minutes of Exercise per Session: Not on file  Kenyatta every 8 (eight) hours as needed for Pain., Disp: 60 tablet, Rfl: 0  •  Fexofenadine HCl (ALLEGRA) 180 MG Oral Tab, Take 180 mg by mouth daily.   , Disp: , Rfl:   •  CALCIUM + D OR, 1 tablet daily, Disp: , Rfl:   •  MULTIVITAMINS OR TABS, 1 TABLET DAILY, Dis U/L    Bilirubin, Total 0.3 0.1 - 2.0 mg/dL    Total Protein 6.6 6.4 - 8.2 g/dL    Albumin 2.5 (L) 3.4 - 5.0 g/dL    Globulin  4.1 2.8 - 4.4 g/dL    A/G Ratio 0.6 (L) 1.0 - 2.0    FASTING No    CBC W/ DIFFERENTIAL    Collection Time: 09/15/21  9:28 AM   Re monitoring and management of side effects     Electronically Signed by:    Shaji Fox ANP-BC  Nurse Practitioner  [facility]

## 2021-09-15 NOTE — PROGRESS NOTES
Comments:  Per Kaylah Farris, patient is to get 1 L of fluids on Friday, 9/17, same day as pump disconnect. Pt here for C3D1 Folfiri.   Arrives Ambulating independently, accompanied by Self           Pregnancy screening: Denies possibility of pregnancy

## 2021-09-15 NOTE — PROGRESS NOTES
Patient presents with: Follow - Up: APN assessment prior to treatment    Pt is here for treatment; C3 D1 folfiri is expected. Pt states she is doing pretty good with treatment. Eating and drinking without issue; denies N,V,D,C.  Energy drops after treatmen

## 2021-09-17 NOTE — PROGRESS NOTES
Education Record    Learner:  Patient    Disease / Diagnosis: here for pump dc and IV fluids.      Barriers / Limitations:  None   Comments:    Method:  Brief focused   Comments:    General Topics:  Plan of care reviewed   Comments:    Outcome:  Shows under

## 2021-09-27 NOTE — PROGRESS NOTES
Patient presents with: Follow - Up: APN assessment prior to treatment    Pt is here for treatment; C4 D1 folfiri is expected. Pt reports that she is doing pretty good.  Eating and drinking without issue; denies N,V,D,C but does have loose stools that are w

## 2021-09-27 NOTE — PROGRESS NOTES
[facility] Progress Note    Patient Name: Abbey Stein   YOB: 1945   Medical Record Number: [id number]   CSN: [id number]   Date of visit: 9/27/2021  Provider: ENA Curtis  Referring Physician: Krystyna Conti    Problem List: in the right popliteal vein.         Before she was switched to a DOAC biopsy of one of the liver lesions was arranged as well as aspiration of the cystic lesion to see if this would help with her pain. These were performed 6/26/20.  Final pathology came ba increased. Wanted to avoid using a pump and was placed on liposomal irinotecan 8/4/21. Did not like how she felt on liposomal irinotecan and was switched back to FOLFIRI 8/18/21.           2.  Stage IIA ovarian cancer and stage IB endometrial cancer diagnos (XARELTO) 10 MG Oral Tab, Take 1 tablet (10 mg total) by mouth daily with food. , Disp: 90 tablet, Rfl: 3  •  traMADol HCl 50 MG Oral Tab, Take 50 mg by mouth every 6 (six) hours as needed for Pain., Disp: , Rfl:   •  Ondansetron HCl (ZOFRAN) 8 MG tablet, T 22 (H) 7 - 18 mg/dL    Creatinine 1.42 (H) 0.55 - 1.02 mg/dL    Calcium, Total 8.5 8.5 - 10.1 mg/dL    Calculated Osmolality 288 275 - 295 mOsm/kg    GFR, Non- 36 (L) >=60    GFR, -American 42 (L) >=60    AST 15 15 - 37 U/L    ALT 16

## 2021-09-29 NOTE — PROGRESS NOTES
Education Record    Learner:  Patient    Disease / Diagnosis: pancreatobiliary carcinoma- pump disconnect     Barriers / Limitations:  None   Comments:    Method:  Brief focused and Discussion   Comments:    General Topics:  Side effects and symptom manage

## 2021-10-13 NOTE — TELEPHONE ENCOUNTER
Patient called to schedule  a follow up appointment. She said Dr. Kalli Soliz wanted to see her after the 10/22 when she has her scan. Please let me know where I can schedule her for a follow up.   Thank you

## 2021-10-13 NOTE — PROGRESS NOTES
Education Record    Learner:  Patient    Disease / Diagnosis: Pancreatobiliary Carcinoma    Barriers / Limitations:  None   Comments:    Method:  Brief focused   Comments:    General Topics:  Plan of care reviewed   Comments:    Outcome:  Shows understandi

## 2021-10-14 NOTE — PROGRESS NOTES
[facility] Progress Note    Patient Name: Carina Lacey   YOB: 1945   Medical Record Number: [id number]   CSN: [id number]   Attending Physician: Francisca Mehta M.D.    Referring Physician: Radha Candelaria MD      Date of Visit: 10/13/2021 cyst showed rare atypical cells.       - started gemcitabine with cisplatin 7/15/20 (as back then was unclear whether had pancreatic primary)      - MSI stable and no  mutation seen through [facility]  [facility]:  Somatic - Potentially Actionable Variant A good lately. More fatigue and malaise. She has a dry cough but no fevers or increased SOB. Constipation. Neuropathy and tinnitus is the same. No bleeding. Denies chest pain, change in urinary habits, headaches or visual symptoms. No recent infections.  Some IIA,  Rx carboplatin and taxol,   =4.0 (9/09)Dr. Rito Holguin   • Personal history of antineoplastic chemotherapy 2009   • Pneumonia due to organism    • Pulmonary embolism [facility])    • Saphenofemoral venous reflux 2/1/2016       Past Surgical History:  Past 12/08, 10/09, declined 10/10      DEXA: declined 11/06, 11/07, 11/08, 10/09, 10/10      Cholesterol: 8/08 (168), 7/09 (138)      Colon: declined 11/06, 11/07, 12/08, 10/09, 10/10      H/O: 10/06 (not returned)            calcium: takes MVI      exercise: w 79 (10/13 0915)  BP: 115/55 (10/13 0915)  Temp: 98.9 °F (37.2 °C) (10/13 0915)  Do Not Use - Resp Rate: --  SpO2: 97 % (10/13 0915)      Physical Examination:  General: Patient is alert and oriented x 3, not in acute distress. HEENT: EOMs intact. PERRL. K 4.0 10/13/2021     10/13/2021    CO2 26.0 10/13/2021     Lab Component   Component Value Date/Time    Carbohydrate Ag 19-9 16,172.6 (H) 10/13/2021 0904        Ref.  Range 8/18/2021 08:03 9/1/2021 08:27 9/15/2021 09:28 9/27/2021 09:36   CARBOHYDRA reduction techniques were used.  Dose information is transmitted to the ACR ([employer]) NRDR ([address]) which    includes the Dose Index Registry.       PATIENT STATED HISTORY:(As transcribed by Technologist)  Dis 2.1 x 1.2 cm and previously measured 2.7 x 1.6 cm.  Additional decrease in size of segment III lesion now   measuring 1.0 x 1.0 cm and previously measured 1.4 x 1.5 cm.  Decrease in size of segment VI lesion now measuring 1.8 x 1.9 cm.  This previously [employer] Pillo Maurer MD on 5/19/2021 at 9:49 AM       Finalized by (CST): Pillo Maurer MD on 5/19/2021 at 11:12 AM            PROCEDURE:  CT CHEST+ABDOMEN+PELVIS(ALL CNTRST ONLY)(CPT=71260/99907)     COMPARISON:  [facility] , CT, CT CHEST+ABDOMEN+PELVIS(ALL CN 1.6 x 2.3 cm mass. Inferiorly within the right lobe is a 2.6 x 2.8 versus 2.1 x 1.8 cm mass. BILIARY:  Cholelithiasis. No visible dilatation.     PANCREAS:  Cystic mass involving the tail extending into the splenic hilum measures 5.4 x 6.2 versus 5.1 x to trend up last visit. 2. PE/DVT- on Xarelto. Progressively worse bleeding episodes on full dose - epistaxis and hematochezia. . Previous PE/DVT resolved on recent imaging but has continued on anticoagulation given metastatic disease.  No further bleedi

## 2021-10-25 NOTE — PROGRESS NOTES
[facility] Progress Note    Patient Name: Oliver Cheney   YOB: 1945   Medical Record Number: [id number]   CSN: [id number]   Attending Physician: Jesusita Pablo M.D.    Referring Physician: Bernardo Murillo MD      Date of Visit: 10/25/2021 metastatic adenocarcinoma c/w pancreatico-biliary primary. CA 19.9 was >154,000. Cytology from aspiration of the cyst showed rare atypical cells.       - started gemcitabine with cisplatin 7/15/20 (as back then was unclear whether had pancreatic primary) paclitaxel. 3. PE/right DVT- diagnosed 6/2020 (see 1). On Xarelto        History of Present Illness:  Here to discuss results of scans done. C/o increasing fatigue and malaise. Has SOB with a dry cough but no fevers.  Has had balance issues and has falle cervix (uteri)    • Endometrial cancer ([facility] 1/09     Grade 1 Stage 1B   • Impaired fasting glucose     highest 116 (7/04)   • Lymphedema of both lower extremities 2/1/2016   • Ovarian cancer ([facility] 1/09     Stage IIendometriod carcinoma of the ovary Stage Concerns:        Not on file    Social History Narrative      Health Mnt:      Pap: 1/09 KEMI/BSO (GUILLERMO I, Endometriod ca of ovaries)- does see Dr. Ashvin Candelaria q 3 mos      : 5/10 (5.2),       Mamm: declined 11/06, 11/07, 11/08, 10/09, 10/10      Breast exa Review of Systems:  A 14-point ROS was done with pertinent positives and negative per the HPI    Vital Signs:  Height: 153.4 cm (5' 0.39\") (10/25 1444)  Weight: 76.7 kg (169 lb) (10/25 1444)  BSA (Calculated - sq m): 1.75 sq meters (10/25 1444)  Pulse: ANIONGAP 7 10/13/2021    GFRNAA 38 (L) 10/13/2021    GFRAA 44 (L) 10/13/2021    CA 8.9 10/13/2021    OSMOCALC 284 10/13/2021    ALKPHO 99 10/13/2021    AST 15 10/13/2021    ALT 16 10/13/2021    BILT 0.5 10/13/2021    TP 6.7 10/13/2021    ALB 2.3 (L) 10/ stable.  Stable 2 mm right upper lobe nodule image number 33. 3 mm right upper lobe nodule image number 43.  Multiple   nodules are noted along the right minor fissure which are stable, largest measuring 2 mm.  Stable 3 mm right lower lobe nodule on image n wall thickening.  Small amount of free pelvic fluid.  Hysterectomy.  Calcified phleboliths within the pelvis.       BONES:  Minimal anterolisthesis of L4 on L5.  Multilevel degenerative disc disease.  Retrolisthesis of T12 on L1.       VASCULATURE:  Normal  No reflux. THROMBI:  None visible. COMPRESSION:  Normal compressibility, phasicity, and augmentation.    OTHER:  Previously demonstrated right popliteal vein thrombus has resolved.                        Impression   CONCLUSION:  No evidence of DVT in image number 72. 3 mm right lower lobe nodule image number 157. Scattered areas of mucous plugging.     PLEURA:  Normal.   MEDIASTINUM/ADE:  No mediastinal or hilar adenopathy.  Calcified right hilar lymph nodes. CARDIAC:  No pericardial effusion.    JOHN     BONES:  Degenerative changes of the thoracic and lumbar spine.  Minimal anterolisthesis of L4 on L5.  Minimal retrolisthesis of T12 on L1.       VASCULATURE:  Normal 3 vessel arch.  Aorta is normal in caliber.  No acute pulmonary embolus the segmenta thyroid lobe. ADE:  No mass or adenopathy. CARDIAC:  No enlargement, pericardial thickening, or significant calcification. PLEURA:  No mass or effusion. CHEST WALL:  No mass or axillary adenopathy. Right Port-A-Cath.   AORTA:  No aneurysm or disse degenerative change. No bony lesion or fracture.                     =====  CONCLUSION:    1. Interval increase in size as well as new intrahepatic lesions.   Interval increase in size of pancreatic tail mass predominantly solid component extending and pos breathing. Will arrange for tap    Had a long and candid conversation with . Astrid Kwan and her . Has had multiple lines of treatment and currently available options limited.  Not a candidate for IO and mutations she has have no currently approved tar

## 2021-10-26 NOTE — TELEPHONE ENCOUNTER
Called and spoke with pt spouse regarding planned apts for tomorrow; ok per verbal consent. Pt is scheduled for education on new treatment, chemo, and to meet with palliative care APN for consult.  At this time the planned treatment drug is on a national ba

## 2021-10-29 NOTE — IMAGING NOTE
Patient here for paracentesis completed by Dr Saroj Hodges. 1.8 liters removed from right side of abdomen. Tegaderm dressing has scant amount of red drainage but intact. Pt denies pain and post procedure vitals on room air are stable.  SBAR called to [facility] @ 161

## 2021-10-29 NOTE — PROGRESS NOTES
received call from Kenguru asking for resources to assist in the home. Patient is weaker and per  was “catatonic” yesterday, but is better now.  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

## 2021-10-30 PROBLEM — R53.1 WEAKNESS: Status: ACTIVE | Noted: 2021-01-01

## 2021-10-31 PROBLEM — D72.829 LEUKOCYTOSIS, UNSPECIFIED TYPE: Status: ACTIVE | Noted: 2021-01-01

## 2021-10-31 NOTE — SEPSIS REASSESSMENT
[facility]    Sepsis Reassessment Note    /48   Pulse 81   Temp 99.8 °F (37.7 °C) (Temporal)   Resp 20   SpO2 94%      I completed the sepsis reassessment at 2320    Cardiac:  Regularity: Regular  Rate: Normal  Heart Sounds: S1,S2    Lungs:

## 2021-10-31 NOTE — CONSULTS
[facility]  Report of Consultation    Carina Lacey Patient Status:  Inpatient    1945 MRN [id number]   [facility] 4NW-A Attending Raudel Land MD   Hosp Day # 0 PCP Radha Candelaria MD     Reason for Consultation:    Metastatic p History:   Procedure Laterality Date   •       x 2   • CATARACT Bilateral     IOL's   • COLONOSCOPY  `    polyp/repeat 3 years   • COLONOSCOPY N/A 2016    Procedure: COLONOSCOPY;  Surgeon:  Nanette Rosas MD;  Location: [address] [place] [address] Data:    Lab Results   Component Value Date    WBC 19.8 10/31/2021    HGB 6.1 10/31/2021    HCT 21.4 10/31/2021    .0 10/31/2021    CREATSERUM 1.12 10/31/2021    BUN 16 10/31/2021     10/31/2021    K 4.3 10/31/2021     10/31/2021    CO2

## 2021-10-31 NOTE — PLAN OF CARE
NURSING ADMISSION NOTE      Patient admitted via cart  Oriented to room. Safety precautions initiated. Bed in low position. Call light in reach. Pt alert and oriented x4. Pt admitted with spouse and son at bedside.   Lactic slightly elevated at

## 2021-10-31 NOTE — ED INITIAL ASSESSMENT (HPI)
Brought in by EMS.  states that patient has been experiencing weakness. She is a cancer patient. Also had a paracentesis yesterday.

## 2021-10-31 NOTE — PROGRESS NOTES
[facility]     Hospitalist Progress Note     Oliver Cheney Patient Status:  Inpatient    1945 MRN [id number]   [facility] 4NW-A Attending Maude Contreras MD   Hosp Day # 0 PCP Bernardo Murillo MD     Chief Complaint: weakness     Sub last 168 hours. Inflammatory Markers  Recent Labs   Lab 10/31/21  0524   FELECIA 793.4*       Imaging: Imaging data reviewed in Epic.     Medications:   • gabapentin  300 mg Oral TID   • rivaroxaban  10 mg Oral Daily with food   • Senna  8.6 mg Oral Daily

## 2021-10-31 NOTE — PLAN OF CARE
Problem: Patient/Family Goals  Goal: Patient/Family Short Term Goal  Description: Patient's Short Term Goal: Have weakness improve and able to go home and perform ADL's     Interventions:   - IV FLuids  -IV Antibiotics  -Oncology consult  -PT/OT eval and Problem: HEMATOLOGIC - ADULT  Goal: Maintains hematologic stability  Description: INTERVENTIONS  - Assess for signs and symptoms of bleeding or hemorrhage  - Monitor labs and vital signs for trends  - Administer supportive blood products/factors, fluids assessment  - Modify environment to reduce risk of injury  - Provide assistive devices as appropriate  - Consider OT/PT consult to assist with strengthening/mobility  - Encourage toileting schedule  Outcome: Progressing       Pt is alert and oriented x 4.

## 2021-10-31 NOTE — H&P
[facility]  History and Physical     Carina Radish Patient Status:  Emergency    1945 MRN [id number]   Location [address] Attending No att. providers found   Hosp Day # 0 PCP Radha Candelaria MD     Chief Complaint Procedure Laterality Date   •       x 2   • CATARACT Bilateral     IOL's   • COLONOSCOPY  `    polyp/repeat 3 years   • COLONOSCOPY N/A 2016    Procedure: COLONOSCOPY;  Surgeon:  Bridget Landeros MD;  Location: [facility] ENDOSCOPY   • EYE DELGADO needed for Nausea., Disp: 30 tablet, Rfl: 3  acetaminophen 500 MG Oral Tab, Take 2 tablets (1,000 mg total) by mouth every 8 (eight) hours as needed for Pain., Disp: 60 tablet, Rfl: 0  Fexofenadine HCl (ALLEGRA) 180 MG Oral Tab, Take 180 mg by mouth daily. Date    COVID19 Not Detected 10/30/2021    COVID19 Not Detected 01/01/2021    COVID19 Not Detected 08/07/2020       Pro-Calcitonin  No results for input(s): PCT in the last 168 hours. Cardiac  No results for input(s): TROP, PBNP in the last 168 hours.

## 2021-10-31 NOTE — PHYSICAL THERAPY NOTE
Order received for PT eval and chart reviewed. Pt currently receiving blood transfusion with Hgb 6.1 this am. RN requests to hold therapy until Hgb recheck. Will continue to follow.

## 2021-10-31 NOTE — ED PROVIDER NOTES
Patient Seen in: [facility] Emergency Department      History   Patient presents with:  Fatigue    Stated Complaint:     Subjective:   HPI    Patient is a pleasant 64-year-old female, with history of pancreatic cancer, presenting for evaluation of w detached retina repair   • HYSTERECTOMY  1/09    KEMI/BSO/omentectomy with pelvic and aortic lymphadenectomy   • OTHER  1/2009    staging procedure/ovarian cancer   • PORT, INDWELLING, IMP  Aug/Sept 2020                Social History    Tobacco Use      Smo Calcium, Total 11.3 (*)     GFR, Non- 37 (*)     GFR, -American 42 (*)     Alkaline Phosphatase 145 (*)     Total Protein 6.3 (*)     Albumin 1.9 (*)     A/G Ratio 0.4 (*)     All other components within normal limits   URINALYSIS WI segment changes. XR CHEST AP PORTABLE  (CPT=71045)    Result Date: 10/30/2021  PROCEDURE:  XR CHEST AP PORTABLE  (CPT=71045)  TECHNIQUE:  AP chest radiograph was obtained.   COMPARISON:  [facility], XR CHEST PA + LAT CHEST (CPT=71046), 6/24/2020, Present on Admission  Date Reviewed: 10/25/2021          ICD-10-CM Noted POA    * (Principal) Weakness R53.1 10/30/2021 Unknown

## 2021-11-01 NOTE — DIETARY NOTE
[facility]  NUTRITION ASSESSMENT    Pt does not meet malnutrition criteria. NUTRITION INTERVENTION:    1. Meal and Snacks - Continue Cardiac Diet as tolerated; monitor patient po intake. Encourage adequate po of appropriate diet.   2. [address] Regular/General diet Is Patient on Accuchecks?  No; Misc Restriction: Cardiac  Oral Supplements: none    Percent Meals Eaten (last 3 days)     Date/Time Percent Meals Eaten (%)    10/31/21 0900 90 %        FOOD/NUTRITION RELATED HISTORY:   Appetite: Fair  I

## 2021-11-01 NOTE — PHYSICAL THERAPY NOTE
PHYSICAL THERAPY EVALUATION - INPATIENT     Room Number: 418/418-A  Evaluation Date: 11/1/2021  Type of Evaluation: Initial  Physician Order: PT Eval and Treat    Presenting Problem: weakness  Reason for Therapy: Mobility Dysfunction and Discharge Pl organism    • Pulmonary embolism (HCC)    • Saphenofemoral venous reflux 2016       Past Surgical History  Past Surgical History:   Procedure Laterality Date   •       x 2   • CATARACT Bilateral     IOL's   • COLONOSCOPY  `    polyp/repeat deficits    RANGE OF MOTION AND STRENGTH ASSESSMENT  Upper extremity ROM and strength are within functional limits see OT evaluation    Lower extremity ROM is within functional limits     Lower extremity strength is within functional limits B LE strength 4 complete. Once sitting, pt instructed in proper hand placement with integration of RW with sit<>stand. Pt required continuous cueing for hand placement.  Pt with Min A for sit to stand, more for safety as pt demonstrates general weakness and requires increa Research supports that patients with this level of impairment may benefit from DC to [facility]. Based on this evaluation, patient's clinical presentation is stable and overall the evaluation complexity is considered low.   These impairments and comorbidities man

## 2021-11-01 NOTE — PROGRESS NOTES
[facility]     Hospitalist Progress Note     Geri Sieving Patient Status:  Inpatient    1945 MRN [id number]   [facility] 4NW-A Attending Mark Osborne MD   Hosp Day # 1 PCP Chinedu Peterson MD     Chief Complaint: weakness     Sub COVID19 Not Detected 08/07/2020       Pro-Calcitonin  Recent Labs   Lab 10/30/21  2123   PCT 1.11*       Cardiac  No results for input(s): TROP, PBNP in the last 168 hours. Creatinine Kinase  No results for input(s): CK in the last 168 hours.     Infl continue xarelto  #Chronic lymphedema of LE  # CKD, stable  Bernadette Santos

## 2021-11-01 NOTE — PROGRESS NOTES
11/01/21 7244   Clinical Encounter Type   Visited With Patient and family together;Health care provider  ( at bedside;  PATRICIA Dunham)   Routine Visit Introduction   Continue Visiting No  (unless requested)   Responded to consult for POLST.   Patie

## 2021-11-01 NOTE — PROGRESS NOTES
Hem/Onc Inpatient Note    Patient Name: Mukesh Harris   YOB: 1945   Medical Record Number: [id number]   CSN: [id number]   Attending oncology Physician: Dr Sarah JIMENEZ: resting in bed, reports feeling well.  Does not remember when last BM was ( not displayed.      Recent Labs   Lab 10/30/21  2123 10/31/21  0524 11/01/21  0538   * 98 109*   BUN 17 16 13   CREATSERUM 1.40* 1.12* 1.06*   GFRAA 42* 56* 59*   GFRNAA 37* 48* 51*   CA 11.3* 9.8 10.2*   ALB 1.9* 1.6*  --     139 137   K 4.3 4

## 2021-11-01 NOTE — OCCUPATIONAL THERAPY NOTE
OCCUPATIONAL THERAPY EVALUATION - INPATIENT     Room Number: 418/418-A  Evaluation Date: 11/1/2021  Type of Evaluation: Initial  Presenting Problem: weakness    Physician Order: IP Consult to Occupational Therapy  Reason for Therapy: ADL/IADL Dysfunction a Procedure: COLONOSCOPY;  Surgeon:  Evelyn Katz MD;  Location: [facility] ENDOSCOPY   • EYE SURGERY Left     detached retina repair   • HYSTERECTOMY  1/09    KEMI/BSO/omentectomy with pelvic and aortic lymphadenectomy   • OTHER  1/2009    staging procedure/ova strength is within functional limits     COORDINATION  Gross Motor    WFL    Fine Motor    WFL      ADDITIONAL TESTS                                    NEUROLOGICAL FINDINGS                   ACTIVITY TOLERANCE                         O2 SATURATIONS ev-care    ADL  Toileting: CGA at standing with ev-care      Pt left seated in chair with needs met and family present. Pt and family educated on POC. Pt and family provided verbal understanding.      Patient End of Session: Up in chair;Needs met;Call training;Cognitive reorientation;Patient/Family education;Patient/Family training;Equipment eval/education; Compensatory technique education;Continued evaluation  Rehab Potential : Good  Frequency (Obs): 3x/week (2-3x/week)  Number of Visits to Meet [facility]

## 2021-11-02 NOTE — PLAN OF CARE
Received a call from [facility], radiology  informing that Xarelto needs to be held 2 days prior to procedure. Patient is on xarelto for DVT PE.Dr Missy Novak covering paged to inform. Awaiting further instructions.

## 2021-11-02 NOTE — CM/SW NOTE
Sw spoke to pt's  this afternoon. He is starting to tour facilities- he want to [facility] this am and plans to see [facility] this afternoon.   Explained [facility] referrals pending and will email him list once all facilities have responded ( [email or web address]

## 2021-11-02 NOTE — PROGRESS NOTES
Hem/Onc Inpatient Note    Patient Name: Rachele Santos   YOB: 1945   Medical Record Number: [id number]   CSN: [id number]   Attending oncology Physician: Dr Benjy Faria a little better compared to when she came it but still puny and weak displayed.      Recent Labs   Lab 10/30/21  2123 10/31/21  0524 11/01/21  0538   * 98 109*   BUN 17 16 13   CREATSERUM 1.40* 1.12* 1.06*   GFRAA 42* 56* 59*   GFRNAA 37* 48* 51*   CA 11.3* 9.8 10.2*   ALB 1.9* 1.6*  --     139 137   K 4.3 4.3 4

## 2021-11-02 NOTE — PLAN OF CARE
Remains fatigued & weak. Alert & responsive. Venofer administered earlier this shift. Hgb stable & no bleeding WBC remains elevated, diagnostic paracentesis scheduled for tomottow at 1500. Anticoagulant on hold.  Due IV antibiotics administered w/ no advers

## 2021-11-02 NOTE — IMAGING NOTE
Spoke to [employer], Rn on floor, pt had Xarelto on 11/01/21, informed her, \"Please call MD and let her know that Xarelto needs to be held x 2 days. \" will let Ultrasound know about the blood thinners.

## 2021-11-02 NOTE — CM/SW NOTE
Sw emailed [facility] list to  and provided copy to pt.  3 facilities are still pending- [facility], [facility] and [facility]. Sw/Cm to update pt/  if any of these facilities accept.     YUE AndresW  /Discharge Pl

## 2021-11-02 NOTE — PLAN OF CARE
Xarelto was held by MD starting today. Hemal[facility]/radiolgy called to confirm patient will have paracentesis tomorrow at 3pm. Will need PT/INR tomorrow morning & no solids 4H prior to procedure. May have clear liquids. Patient informed of plan.

## 2021-11-02 NOTE — PROGRESS NOTES
[facility]     Hospitalist Progress Note     Fatuma Jackson Patient Status:  Inpatient    1945 MRN [id number]   [facility] 4NW-A Attending Yash Perez MD   Hosp Day # 2 PCP Nabor Renner MD     Chief Complaint: weakness     Sub Results    COVID-19  Lab Results   Component Value Date    COVID19 Not Detected 10/30/2021    COVID19 Not Detected 01/01/2021    COVID19 Not Detected 08/07/2020       Pro-Calcitonin  Recent Labs   Lab 10/30/21  2123   PCT 1.11*       Cardiac  No results fo malignancy                 diagnostic paracentesis tomorrow   Continue abx  # ascites s/p paracentesis   #Anemia duet o chemo- PRNC x1 on 10/31 for hb <7, monitor   #Pancreatobiliary ca   #HyperCalcemia, improving  #h/o VTe- continue xarelto  #Chronic lymp

## 2021-11-02 NOTE — CM/SW NOTE
11/02/21 1100   CM/SW Referral Data   Referral Source Physician   Reason for Referral Discharge planning   Informant Patient   Patient Info   Patient's Current Mental Status at Time of Assessment Alert;Oriented       Sw met with pt to discuss dc plans.

## 2021-11-02 NOTE — IMAGING NOTE
Spoke to [employer]a [employer], RN on the floor, Rn is calling MD with Xarelto instructions,pt is consentable, pt has a Port for Access, pt need to be NPO x 4 hours, but can have clear liquids, stat PT/INR in am.

## 2021-11-03 NOTE — CM/SW NOTE
CM met with patient at bedside and provided choice list for [facility]. She states she will look it over with her spouse later. CM will F/U tomorrow.  &  to remain available and supportive for discharge planning needs.     Eulis Ards

## 2021-11-03 NOTE — PROGRESS NOTES
[facility]     Hospitalist Progress Note     Abbey Stein Patient Status:  Inpatient    1945 MRN [id number]   [facility] 4NW-A Attending Libby Ruiz MD   Hosp Day # 3 PCP Torrey Hickman MD     Chief Complaint: weakness     Sub BILT 0.5  --  0.5  --  0.6   TP 6.3*  --  5.3*  --  5.5*    < > = values in this interval not displayed. Estimated Creatinine Clearance: 36.8 mL/min (based on SCr of 0.95 mg/dL).     Recent Labs   Lab 10/29/21  1346 11/03/21  0623   PTP 16.2* 16.9* isolation: yes      Will the patient be referred to [facility] on discharge?: no  Estimated date of discharge: 1-2 days, SW arranging [facility].      Pt examined, no copmlaints  GA NAD  CV RRR  GI soft,  Plan of care  - diagnostic para today, continue IV  Abx  - Pancrea

## 2021-11-03 NOTE — PLAN OF CARE
Appears fatigued & tired. More interactive & awake yesterday. Per report,patient didn't have a good sleep overnight. No c/o discomfort otherwise. Fluids offered. No solids for paracentesis this pm. Holding xarelto. Kept comfortable & dry.  Turned & repositi ADULT  Goal: Maintains hematologic stability  Description: INTERVENTIONS  - Assess for signs and symptoms of bleeding or hemorrhage  - Monitor labs and vital signs for trends  - Administer supportive blood products/factors, fluids and medications as ordere to reduce risk of injury  - Provide assistive devices as appropriate  - Consider OT/PT consult to assist with strengthening/mobility  - Encourage toileting schedule  Outcome: Progressing

## 2021-11-03 NOTE — PLAN OF CARE
Patient is back from paracentesis. Awake & alert,verbally responsive. Puncture site on the L midlower abdomen noted w/ bandage,intact & dry. No complications noted. Patient c/o tolerable pain on the site. Vital signs stable. Will continue to monitor.

## 2021-11-03 NOTE — PLAN OF CARE
Patient alert and oriented x4. Vital signs stable. Afebrile. Room air. No SOB. No chest pain. No complaints of pain. No nausea and  vomiting. Maintained on IV antibiotics, tolerated well. RUE US Doppler completed - Negative for DVT.   Patient scheduled for

## 2021-11-03 NOTE — PROGRESS NOTES
Hem/Onc Inpatient Note    Patient Name: Monse Hickman   YOB: 1945   Medical Record Number: [id number]   CSN: [id number]   Attending oncology Physician: Dr Meche Jonas    S: Has not had paracentesis as yet    Allergies:    Penicillins             R 59*   CA 11.3*   < > 9.8 10.2* 9.9   ALB 1.9*  --  1.6*  --  1.5*      < > 139 137 135*   K 4.3   < > 4.3 4.0 4.2      < > 109 108 104   CO2 28.0   < > 27.0 26.0 27.0   ALKPHO 145*  --  130  --  154*   AST 35  --  36  --  32   ALT 25  --  22  -

## 2021-11-03 NOTE — PHYSICAL THERAPY NOTE
PHYSICAL THERAPY TREATMENT NOTE - INPATIENT    Room Number: 371/329-Z     Session: 1   Number of Visits to Meet Established Goals: 5    Presenting Problem: weakness    History related to current admission:  Pt admitted on 10/30/21 from home secondary to w detached retina repair   • HYSTERECTOMY  1/09    KEMI/BSO/omentectomy with pelvic and aortic lymphadenectomy   • OTHER  1/2009    staging procedure/ovarian cancer   • PORT, INDWELLING, IMP  Aug/Sept 2020       SUBJECTIVE  Pt with delayed responses  \"what a prior to session. Pt presents in semi sup, son present. Sup>sit mod A cues for sequencing and increased time as pt demos difficulty motor planning. Sit>stand min A, regressing to mod A on 2nd attempt.    Pt demos L lean, visual and verbal cues for neutr transfers Sit to/from Stand at assistance level: supervision      Goal #3 Patient is able to ambulate 150 feet with assist device: walker - rolling at assistance level: supervision      Goal #4     Goal #5     Goal #6     Goal Comments: Goals established o

## 2021-11-04 NOTE — PROGRESS NOTES
Hem/Onc Inpatient Note    Patient Name: Charles Groves   YOB: 1945   Medical Record Number: [id number]   CSN: [id number]   Attending oncology Physician: Dr Reyna Essex: Alert today and responsive.  Does not remember much of what happened yester < > 13 15 18   CREATSERUM 1.12*   < > 1.06* 0.95 1.05*   GFRAA 56*   < > 59* 68 60   GFRNAA 48*   < > 51* 59* 52*   CA 9.8   < > 10.2* 9.9 9.5   ALB 1.6*  --   --  1.5* 1.4*      < > 137 135* 136   K 4.3   < > 4.0 4.2 4.0      < > 108 104 105 sundramaing? DC planning. Ok to transfer to [facility] from onc standpoint as soon as arrangements set.        Leo Nice MD  [facility] [place] [facility]

## 2021-11-04 NOTE — PLAN OF CARE
Problem: GASTROINTESTINAL - ADULT  Goal: Maintains adequate nutritional intake (undernourished)  Description: INTERVENTIONS:  - Monitor percentage of each meal consumed  - Identify factors contributing to decreased intake, treat as appropriate  - Assist patient/family  Outcome: Adequate for Discharge   On continued iv abt,Denies any pain or discomfort, Appetite is fair,  Good blood return from rt bethany cath,Paracentesis site is dry on mid abdomen,  Assisted needs,

## 2021-11-04 NOTE — PROGRESS NOTES
[facility]     Hospitalist Progress Note     Bev Rouse Patient Status:  Inpatient    1945 MRN [id number]   [facility] 4NW-A Attending Sebastian Hardy MD   Hosp Day # 4 PCP Kelley Renner MD     Chief Complaint: weakness     Subj ALKPHO 130  --   --  154* 173*   AST 36  --   --  32 35   ALT 22  --   --  25 26   BILT 0.5  --   --  0.6 0.6   TP 5.3*  --   --  5.5* 5.6*    < > = values in this interval not displayed.        Estimated Creatinine Clearance: 33.3 mL/min (A) (based on SC collected yesterday. FRANKLIN Mondragon  12:41 PM         Pt examined, no nausea, vomiting or abdominal pain. Confused at times.   GA NAD, awake and alert  CV RRR  GI soft, nontender, nondistended  Lungs: Clear bilaterally    Plan of care  -Para consis

## 2021-11-04 NOTE — CM/SW NOTE
CM spoke with patient's  Reji Healy re his choice for [facility]. He states he will know for sure by tomorrow. He still needs to tour [employer].  &  to remain available and supportive for discharge planning needs.     D

## 2021-11-04 NOTE — PLAN OF CARE
Patient alert and oriented x3. Vital signs stable. Afebrile. Room air. No SOB. Comfortably sleeping now in bed. Patient Paracentesis completed, maintained on IV antibiotics, tolerated well. No complaints of pain. No nausea and vomiting.  Fall precautions in assistive devices  - Assist with transfers and ambulation using safe patient handling equipment as needed  - Ensure adequate protection for wounds/incisions during mobilization  - Obtain PT/OT consults as needed  - Advance activity as appropriate  - Commun

## 2021-11-04 NOTE — TELEPHONE ENCOUNTER
Called  Shari Michelleronal- went to [employer]. Left message.  Will try again after seeing her in the hospital.

## 2021-11-05 NOTE — PROGRESS NOTES
[facility]     Hospitalist Progress Note     Ilene Combs Patient Status:  Inpatient    1945 MRN [id number]   [facility] 4NW-A Attending Arjun Borja MD   Hosp Day # 5 PCP Patricia Trinidad MD     Chief Complaint: weakness     Subj 26.0 27.0 27.0   ALKPHO 130  --   --  154* 173*   AST 36  --   --  32 35   ALT 22  --   --  25 26   BILT 0.5  --   --  0.6 0.6   TP 5.3*  --   --  5.5* 5.6*    < > = values in this interval not displayed.        Estimated Creatinine Clearance: 33.3 mL/min ( pain. Less confused.   GA NAD, awake and alert  CV RRR  GI soft, nontender, nondistended  Lungs: Clear bilaterally     Plan of care  -Will transition abx to cipro  -Pancreaticobiliary ca-per Hem/Onc  -Continue xarelto  -To [facility] today     Alexi Medicine, DO

## 2021-11-05 NOTE — PLAN OF CARE
Problem: GASTROINTESTINAL - ADULT  Goal: Maintains adequate nutritional intake (undernourished)  Description: INTERVENTIONS:  - Monitor percentage of each meal consumed  - Identify factors contributing to decreased intake, treat as appropriate  - Assist patient/family  Outcome: Progressing  Goal: Maintain proper alignment of affected body part  Description: INTERVENTIONS:  - Support and protect limb and body alignment per provider's orders  - Instruct and reinforce with patient and family use of appropria

## 2021-11-05 NOTE — DIETARY NOTE
[address] [facility]    Pt does not meet malnutrition criteria. NUTRITION INTERVENTION:    1. Meal and Snacks - Continue Cardiac Diet as tolerated; monitor patient po intake. Encourage adequate po of appropriate diet.   2. Medical Food Yang 75.6 kg (166 lb 9.6 oz)  09/01/21 : 74.4 kg (164 lb)  08/18/21 : 74.8 kg (165 lb)  08/04/21 : 73 kg (161 lb)       NUTRITION:  Diet: Orders Placed This Encounter      Regular/General diet Is Patient on Accuchecks?  No; Misc Restriction: Cardiac  Oral Supple

## 2021-11-05 NOTE — PHYSICAL THERAPY NOTE
PHYSICAL THERAPY TREATMENT NOTE - INPATIENT    Room Number: 738/836-P     Session: 2   Number of Visits to Meet Established Goals: 5    Presenting Problem: weakness    History related to current admission:  Pt admitted on 10/30/21 from home secondary to w detached retina repair   • HYSTERECTOMY  1/09    KEMI/BSO/omentectomy with pelvic and aortic lymphadenectomy   • OTHER  1/2009    staging procedure/ovarian cancer   • PORT, INDWELLING, IMP  Aug/Sept 2020       SUBJECTIVE  Pt with improved response time and Therapy Provided: RN consulted prior to session. Pt presents in semi sup, spouse present. Sup>sit min A cues for sequencing . Max A to scoot forward. Sit>stand CGA from elevated bed. Pt t/f bed >chair c min A and RW. Wheeled pt into kohler.  Pt gait t rolling at assistance level: supervision      Goal #4     Goal #5     Goal #6     Goal Comments: Goals established on 11/1/2021     PT with proper PPE donned to include mask, gloves, and goggle            Spouse in mask

## 2021-11-05 NOTE — OCCUPATIONAL THERAPY NOTE
OCCUPATIONAL THERAPY TREATMENT NOTE - INPATIENT     Room Number: 166/408-H  Session: 1   Number of Visits to Meet Established Goals: 3    Presenting Problem: weakness    History related to current admission: Patient is 77year old female admitted on 10/30/ retina repair   • HYSTERECTOMY  1/09    KEMI/BSO/omentectomy with pelvic and aortic lymphadenectomy   • OTHER  1/2009    staging procedure/ovarian cancer   • PORT, INDWELLING, IMP  Aug/Sept 2020       SUBJECTIVE  Spouse states \"I thought you couldn't walk! Equipment used: RW, hospital bed features   Pt able to demonstrate functional use with cueing    Exercises:    Exercises Repetitions Comments   Scapular elevation     Scapular retraction     Shoulder rolls     Shoulder flexion 10    Shoulder abduction modified independent     UE Exercise Program Goal  Patient will be independent with bilateral AROM HEP (home exercise program).     Additional Goals:  Pt will complete Short Blessed Test when appropriate.     Pt will complete dressing ADLs using adaptive e

## 2021-11-05 NOTE — DISCHARGE SUMMARY
[facility] HOSPITALIST  DISCHARGE SUMMARY     Carinadarren Burrellsh Patient Status:  Inpatient    1945 MRN [id number]   [facility] 4NW-A Attending Olegario López DO   Hosp Day # 5 PCP Radha Candelaria MD     Date of Admission: 10/30/2021  Date of [facility] Linsey Recent MRI of the brain on 10/28 was negative. Ammonia level within normal limits. Patient's mental status improved. Possible underlying dementia. WBCs plateaued around 19. We repeated paracentesis with analysis of ascitic fluid.   Fluid consistent with daily. Refills: 0     multivitamin Tabs      daily. Refills: 0     ondansetron 8 MG tablet  Commonly known as: ZOFRAN      Take 1 tablet (8 mg total) by mouth every 8 (eight) hours as needed for Nausea.    Quantity: 30 tablet  Refills: 3     prochlor antibiotics. She will be discharged to subacute rehab and follow-up with PCP and oncology as outpatient.     Lavell Harrington DO

## 2021-11-05 NOTE — CM/SW NOTE
CM received call from spouse who states they have chosen [facility] [address] [facility] [address] CM reserved it in aidin.  &  to remain available and supportive for discharge planning needs.     Viktoriya Dyson, PATRICIA Case Manager [phone]

## 2021-11-05 NOTE — PROGRESS NOTES
Hem/Onc Inpatient Note    Patient Name: Chavo Dorsey   YOB: 1945   Medical Record Number: [id number]   CSN: [id number]   Attending oncology Physician: Dr Imelda Negron    S: Feeling better.      Allergies:    Penicillins             RASH  Flonase [F 60   GFRNAA 48*   < > 51* 59* 52*   CA 9.8   < > 10.2* 9.9 9.5   ALB 1.6*  --   --  1.5* 1.4*      < > 137 135* 136   K 4.3   < > 4.0 4.2 4.0      < > 108 104 105   CO2 27.0   < > 26.0 27.0 27.0   ALKPHO 130  --   --  154* 173*   AST 36  --   - looking at placed and hopes to make a decision by later today. Ok to transfer to [facility] from onc standpoint as soon as arrangements set. Resume chemotherapy outpatient.        Fifi Macias MD  [facility]

## 2021-11-05 NOTE — CM/SW NOTE
OLGA spoke with Merline Narvaez at [facility] and they can take patient tonight at 6pm. CM spoke with JASON GOMEZ and also patient's  Charmaine Junior. CM reserved [facility] for 6pm and completed PCS.       &  to remain available and supportive for discharge

## 2021-11-06 NOTE — CDS QUERY
Potential Procedure/Diagnosis Kala Chang    Dear Dr. Rebecca Lundborg:   Clinical information in the patient's medical record (provided below) includes documentation of a diagnosis and a recent procedure.  For accurate ICD growth to date on culture however patient's been on Merrem prior to collection. She will continue on Cipro for 4 more days to complete a total of 10 days of antibiotics.  “       For questions regarding this query, please contact Clinical Documentation Int

## 2021-11-06 NOTE — PROGRESS NOTES
pPatient discharged, denies having pain, portacath flushed with heparin per protocol. Patient alert oriented times four, has swelling to lower extremities.  Attempted on four different occasions to give report to [facility] [phone] Patient requires assist

## 2021-11-08 NOTE — PROGRESS NOTES
Daron Edgar  : 1945  Age 77year old  female patient is admitted to Facility: [facility] [place] for  [facility] date:   10.30.21  Discharge date to [facility]:   21  ELOS:   12-14 days  Anticipated discharge date:    21 Calculus of kidney    • Cancer [facility])     Pancreas - chemo 2020   • Cholelithiasis     5/09 CT shows 3x2.7cm stone (asymptomatic)   • Deep vein thrombosis ([address] 06/2020    R leg   • Disorder of liver     cancer in liver   • Dyslipidemia      diet therapy Comment:Red face, looked like a burn    CODE STATUS:  DNR    ADVANCED CARE PLANNING TEAM: None      CURRENT MEDICATIONS   Current Outpatient Medications   Medication Sig Dispense Refill   • nystatin 319329 UNIT/ML Mouth/Throat Suspension Take 5 mL by mouth frequency; no vaginal discharge; no urinary incontinence; no hematuria  MUSCULOSKELETAL:no joint complaints upper or lower extremities  NEURO:no sensory or motor complaint, denies seizures, denies vertigo, denies tinnitus and denies tremors  PSYCHE: no sym commands  PSYCHIATRIC: alert and oriented x 3; affect appropriate      DIAGNOSTICS REVIEWED AT THIS VISIT:  11.6.21  Glucose  100  BUN  22  Cr  0.89  GFR  >60  Na  137  K  4.7    WBC  19.57  Hgb  7.9  Hematocrit  26.2  Absolute neutrophils  16.11    THE [facility] [2]    Patient's total score:  7      Hospital score=Risk level/Risk of potentially avoidable 30-day readmission:  0-4 points=Low risk/                    5.8%  5-6 points=Intermediate risk/      12.0%  7 or more points=High risk/         22 completing medication reconciliation and entering orders to establish plan of care in [facility].     Carlyle Cifuentes, ENA  11/08/21   9:15  AM

## 2021-11-10 NOTE — PROGRESS NOTES
Lea Ball, 32/1945, 77year old, female    Chief Complaint:  Patient presents with:   Follow - Up: Weakness/Spontaneous bacterial peritonitis/Anemia/Leukocytosis  Back Pain  Constipation  Weakness       Subjective:   PMH significant for HL, PE/RLE DVT ---deferred  RESPIRATORY:clear to auscultation anteriorly and posteriorly, no wheezing/cough/accessory muscle use; on room air  CARDIOVASCULAR: S1, S2 normal, RRR; no S3, no S4; , no click, no murmur  ABDOMEN:  normal active BS+, soft, +distended; no organ days     HL  · Not currently on tx     Previous PE/RLE DV T  · Xarelto 10 mg daily  · TEDs daily     CKD   · Monitor labs  · Minimize nephrotoxic meds as able     PN 2/2 chemo  · Gabapentin 300 mg TID     Seasonal allergies  · Allegra 180 mg daily     COVI

## 2021-11-12 PROBLEM — C24.9: Status: ACTIVE | Noted: 2020-07-01

## 2021-11-12 PROBLEM — R79.89 AZOTEMIA: Status: ACTIVE | Noted: 2021-01-01

## 2021-11-12 PROBLEM — D72.829 LEUKOCYTOSIS: Status: ACTIVE | Noted: 2021-01-01

## 2021-11-12 NOTE — PROGRESS NOTES
Danielle Martínez, 32/1945, 77year old, female    Chief Complaint:  Patient presents with:   Follow - Up: Weakness/Spontaneous bacterial peritonitis/Anemia/Leukocytosis  Low Back Pain       Subjective:   PMH significant for HL, PE/RLE DVT on Xarelto, Anemia, organomegaly, no masses; no bruits; nontender, no guarding, no rebound tenderness. :Deferred  LYMPHATIC:---B/L LE lymphedema, R>L--stable  MUSCULOSKELETAL: no acute synovitis upper or lower extremity.   Weakness R/T recent hospitalization/diagnoses/seque · Monitor labs  · Minimize nephrotoxic meds as able     PN 2/2 chemo  · Gabapentin 300 mg TID     Seasonal allergies  · Allegra 180 mg daily     COVID 19 Pandemic  · COVID test negative on 10.30.21  · S/P Pfizer COVID vaccine:  1st dose=2.1.21; 2nd dose=

## 2021-11-13 PROBLEM — N17.9 AKI (ACUTE KIDNEY INJURY) (HCC): Status: ACTIVE | Noted: 2021-01-01

## 2021-11-13 PROBLEM — E86.0 DEHYDRATION: Status: ACTIVE | Noted: 2021-01-01

## 2021-11-13 NOTE — PROGRESS NOTES
[facility] Pharmacy Note:  Renal Adjustment for meropenem (MERREM)    Monse Hickman is a 77year old patient who has been prescribed meropenem (MERREM) 500 mg every 8 hrs. The estimated creatinine clearance is 29.6 mL/min (A) (based on SCr of 1.18 mg/dL (H)).  Vanessa Klein

## 2021-11-13 NOTE — H&P
[facility]ARY HOSPITALIST  History and Physical     [facility] Patient Status:  Emergency    1945 MRN [id number]   Location [facility] Attending Maria C Potts MD   Hosp Day # 0 PCP Kelley Renner MD     Chief Complaint: le cancer   • PORT, INDWELLING, IMP  Aug/Sept 2020       Social History:  reports that she has never smoked. She has never used smokeless tobacco. She reports previous alcohol use. She reports that she does not use drugs.     Family History:   Family History MG Oral Tab, Take 2 tablets (1,000 mg total) by mouth every 8 (eight) hours as needed for Pain., Disp: 60 tablet, Rfl: 0  Fexofenadine HCl (ALLEGRA) 180 MG Oral Tab, Take 180 mg by mouth daily. , Disp: , Rfl:   CALCIUM + D OR, daily. , Disp: , Rfl:   MULTI input(s): PCT in the last 168 hours. Cardiac  No results for input(s): TROP, PBNP in the last 168 hours. Creatinine Kinase  No results for input(s): CK in the last 168 hours.     Inflammatory Markers  No results for input(s): CRP, FELECIA, LDH, DDIMER in

## 2021-11-13 NOTE — PROGRESS NOTES
[facility]     Hospitalist Progress Note     Rand Lauren Patient Status:  Inpatient    1945 MRN [id number]   [facility] 4NW-A Attending Sofía DíazPRAKASH [facility] ER Day # 0 PCP Vu Nolasco MD     Chief Complaint: Prem Rosanna hours.    Creatinine Kinase  No results for input(s): CK in the last 168 hours. Inflammatory Markers  No results for input(s): CRP, FELECIA, LDH, DDIMER in the last 168 hours. Imaging: Imaging data reviewed in Epic.     Medications:   • gabapentin  300 mg

## 2021-11-13 NOTE — PLAN OF CARE
Weak & fatigued. Mostly quiet,slow to respond & in a soft voice. C/o chronic back pain & also abdominal pain. Lidoderm patch applied to lower back & tylenol for pain. Resting in bed at this time & no further complaints. Due dose of vanco administered.  Copies

## 2021-11-13 NOTE — CONSULTS
PHARMACY CONSULT: [facility] consulted to dose IV vancomycin per Dr Darlyn Polanco for treatment of intra-abdominal infection. Patient was recently admitted to [facility] and treated with IV meropenem for SBP. Discharged on 11/5/21 to [facility] on oral Cipro.   Pa

## 2021-11-13 NOTE — ED INITIAL ASSESSMENT (HPI)
Pt arrived to ED via EMS for evaluation of abnormal labs. Per EMS, pt had fluid drained from ABD. Pt has elevated WBC, pt on antibiotics, but nursing home believes they are not working. Pt Procal is elevated also. Pt is aox4.

## 2021-11-13 NOTE — ED QUICK NOTES
Orders for admission, patient is aware of plan and ready to go upstairs. Any questions, please call ED RN Lisha Kay  at extension [phone]. Vaccinated?  Yes  Type of COVID test sent: Rapid  COVID Suspicion level: Low        Titratable drug(s) infusing:  Rat

## 2021-11-13 NOTE — CONSULTS
[facility] Dosing Service    Initial Pharmacokinetic Consult for Vancomycin AUC Dosing    Michael Godoy is a 77year old patient who is being treated for sepsis. Pharmacy has been asked to dose vancomycin by Dr Gemini Khan.   Patient was noted to have been

## 2021-11-13 NOTE — ED PROVIDER NOTES
Patient Seen in: [facility] Emergency Department      History   Patient presents with:  Abnormal Labs    Stated Complaint: Abnormal labs    Subjective:   HPI    42-year-old female here for elevated white blood cell count.   This patient has pancreati COLONOSCOPY  2011`    polyp/repeat 3 years   • COLONOSCOPY N/A 11/16/2016    Procedure: COLONOSCOPY;  Surgeon:  Dina Brush MD;  Location: [address] [place] [address] ENDOSCOPY   • EYE SURGERY Left     detached retina repair   • HYSTERECTOMY  1/09    KEMI/BSO/omentectomy with (*)     Total Protein 6.1 (*)     Albumin 1.6 (*)     Globulin  4.5 (*)     A/G Ratio 0.4 (*)     All other components within normal limits   LIPASE - Abnormal; Notable for the following components:    Lipase 52 (*)     All other components within normal l Present on Admission  Date Reviewed: 10/31/2021          ICD-10-CM Noted POA    * (Principal) Leukocytosis, unspecified type D72.829 10/31/2021 Unknown    Azotemia R79.89 11/12/2021 Yes    Can[facility] C24.9 7/1/2020 Unknown    Silvia Khan

## 2021-11-13 NOTE — PLAN OF CARE
NURSING ADMISSION NOTE      Patient admitted via cart  Oriented to room. Safety precautions initiated. Bed in low position. Call light in reach. Pt alert and oriented x4. VSS and afebrile. Port a cath accessed and biopatch placed.   All orders r

## 2021-11-13 NOTE — CONSULTS
[facility] [place] [facility] Hematology and Oncology Consult Note    Reason for Consult: Pancreatic cancer   Medical Record Number: [id number]   CSN: [id number]   Referring Physician: No ref.  provider found  PCP: Darren Ga MD    History of Present Illness:   79F with a PMH o CONTINUOUS, 200-3,000 Units/hr, Intravenous, Continuous  •  vancomycin IVPB premix 2g in 0.9% NaCl 500 mL, 25 mg/kg, Intravenous, Once  •  [COMPLETED] sodium chloride 0.9% IV bolus 500 mL, 500 mL, Intravenous, Once **FOLLOWED BY** 0.9% NaCl infusion, , Int Used    Vaping Use      Vaping Use: Never used    Substance and Sexual Activity      Alcohol use: Not Currently      Drug use: No      Sexual activity: Yes        Partners: Male     Family History   Problem Relation Age of Onset   • Other (leukemia) Father lesions. BILIARY: Niurka Chew is a large gallstone noted. Niurka Chew is no significant bile duct dilatation.    PANCREAS:  No lesion, fluid collection, ductal dilatation, or atrophy.     SPLEEN:  Infiltrative lesion along the anterior lateral capsule of the spleen

## 2021-11-13 NOTE — CONSULTS
INFECTIOUS DISEASE CONSULT NOTE    Luis Alberto Reasons Patient Status:  Inpatient    1945 MRN [id number]   [facility] 4NW-A Attending Lefty Wharton [address] Day # 0 PCP Lela Lu Past Surgical History:   Procedure Laterality Date   •       x 2   • CATARACT Bilateral     IOL's   • COLONOSCOPY  `    polyp/repeat 3 years   • COLONOSCOPY N/A 2016    Procedure: COLONOSCOPY;  Surgeon:  Pily Resendiz MD;  Josefina Peres **FOLLOWED BY** 0.9% NaCl infusion, , Intravenous, Continuous    Review of Systems:    A comprehensive 10 point review of systems was completed. Pertinent positives and negatives noted in the the HPI. Physical Exam:    General: No acute distress.  Alert specialist     Leukopenia     Anemia     Pancreatobiliary-type carcinoma (HCC)     Weakness     Leukocytosis, unspecified type     Dehydration     EN (acute kidney injury) ([facility] [address]     Leukocytosis     Azotemia      ASSESSMENT:    1. Reactive leukocytosis

## 2021-11-14 PROBLEM — R52 UNCONTROLLED PAIN: Status: ACTIVE | Noted: 2021-01-01

## 2021-11-14 NOTE — PROGRESS NOTES
Heparin gtt held per protocol. Will decrease in 60 min by 250 per order. Denies pain.   [facility] sent

## 2021-11-14 NOTE — PROGRESS NOTES
INFECTIOUS DISEASE PROGRESS NOTE    Kadie Nina Patient Status:  Inpatient    1945 MRN [id number]   [facility] 4NW-A Att[facility] Day # 1 PCP Mack Herrera muscles are intact. Neck: No lymphadenopathy. No JVD. No carotid bruits. Respiratory: Clear to auscultation bilaterally. No wheezes. No rhonchi. Cardiovascular: S1, S2.  Regular rate and rhythm. No murmurs.   Abdomen: distended; +ascites; no abdominal hepatic metastases  5.  DVT/PE history     PLAN:    -Await blood cultures  -Continue Vancomycin IV/Meropenem  -Plans for repeat paracentesis  -Follow CBC    Jak Garcia MD  [address] [place] [address]

## 2021-11-14 NOTE — PLAN OF CARE
[facility]. Due IV antibiotics declined by patient's family. Spouse & kids were at bedside during transition to hospice care. Patient appears comfortable. Drowsy but responds to verbal & physical stimulation.  No SOB,not in

## 2021-11-14 NOTE — HOSPICE RN NOTE
Met with pt and spouse (son and dil at later time) to discuss hospice philosophy, services, levels and goals of care.   Case discussed with Dr. Deshawn Dupont and Dr. Shereen Mota who are in agreement that pt meets criteria for GIP level of care fo

## 2021-11-14 NOTE — CONSULTS
[facility]    Report of Consultation    Lea Ball Patient Status:  Inpatient    1945 MRN [id number]   [facility] 4NW-A Attending Cecilio Calvo [facility] Day # 1 ALLEY Jasso MD     Date of Admission:  20 reflux 2016     Past Surgical History:   Procedure Laterality Date   •       x 2   • CATARACT Bilateral     IOL's   • COLONOSCOPY  `    polyp/repeat 3 years   • COLONOSCOPY N/A 2016    Procedure: COLONOSCOPY;  Surgeon:  Marko Manley HCl (DILAUDID) 1 MG/ML injection 0.2 mg, 0.2 mg, Intravenous, Q2H PRN  •  [COMPLETED] sodium chloride 0.9% IV bolus 500 mL, 500 mL, Intravenous, Once **FOLLOWED BY** 0.9% NaCl infusion, , Intravenous, Continuous    Review of Systems:  GENERAL: feels well o Dehydration     EN (acute kidney injury) ([facility]     Leukocytosis     Azotemia     Malignant neoplasm of pancreas [facility])      Impression  77year-old with metastatic pancreatic CA worsening on CT scan on Xarelto also history ovarian CA with recent treatment

## 2021-11-14 NOTE — CM/SW NOTE
CM acknowledging new hospice order. Also call from RN that MD requesting hospice consult ASAP.      Spoke with Carli Riojas with [facility] and she is aware of new referral.     Kaitlin Richardson RN, [address] [place] [address]  Extension [phone]

## 2021-11-14 NOTE — PROGRESS NOTES
11/14/21 6588   Clinical Encounter Type   Visited With Patient not available   Continue Visiting Yes

## 2021-11-14 NOTE — PLAN OF CARE
Appears comfortable. No SOB,Had c/ pain on the lower back& abdomen earlier this shift,dilaudid 0.2mg administered,w/ relief noted.  Heparin drip discontinued & informed social service of consult for hospice eval. [facility] nurse called to inform t

## 2021-11-14 NOTE — PROGRESS NOTES
I talked with Dr. Rommel Christie, patient has decided hospice no further work-up. Would then hold off on paracentesis and further evaluation and please call if we can be of further assistance.

## 2021-11-14 NOTE — PROGRESS NOTES
[facility] Dosing Service    Follow-up Pharmacokinetic Consult for Vancomycin AUC Dosing     Carina Lacey is a 77year old patient who is being treated for intra-abdominal infection and sepsis.   Patient received vancomycin 2000 mg IV and first-dose AUC

## 2021-11-14 NOTE — PROGRESS NOTES
[facility] Hematology and Oncology Progress Note   Length of Stay: 1    Subjective: NAEO. Planning for comfort care.  up. She does not want to pursue a paracentesis. Note sure if she wants home or inpatient.      ROS: 12 Point ROS completed and pertinent seen   series 2, image 49 to measure 6.9 x 5.5 cm (previously 5 x 3.5 cm).  There is similar increase in size of all the liver lesions. BILIARY: Korina Gold is a large gallstone noted. Korina Gold is no significant bile duct dilatation.    PANCREAS:  No lesion, flu MD  [facility] Hematology and Oncology

## 2021-11-14 NOTE — PROGRESS NOTES
[facility]     Hospitalist Progress Note     Michael Godoy Patient Status:  Inpatient    1945 MRN [id number]   [facility] 4NW-A Attending [facility] Day # 1 PCP Vanessa Garza MD     Chief Complaint: Meaghan Cason ALKPHO 205*  --  201*   AST 49*  --  47*   ALT 33  --  30   BILT 0.5  --  0.4   TP 6.1*  --  5.9*       Estimated Creatinine Clearance: 28.4 mL/min (A) (based on SCr of 1.23 mg/dL (H)). No results for input(s): PTP, INR in the last 168 hours. xarelto/heparin drip  · CODE status: DNAR/select  · Carvajal: no  · Central line: no  · If COVID testing is negative, may discontinue isolation: yes     Will the patient be referred to [facility] on discharge?: no  Estimated date of discharge: 2 or more midnights  D

## 2021-11-15 NOTE — PROGRESS NOTES
11/15/21 7245   Clinical Encounter Type   Visited With Patient not available  (Family not available)   Continue Visiting Yes  (Per RN, maybe attempt to visit earlier in the day when family is more often present.)   Spiritual care remains available at pa

## 2021-11-15 NOTE — HOSPICE RN NOTE
[facility] rounds: Pt visit conducted, no visitors at this time. Pt appears more comfortable on increased morphine dose of 2mg/hr. No objective signs or symptoms of pain. Discussed with RNs Nicki Cisneros.  Will continue to monitor and assess f

## 2021-11-15 NOTE — SPIRITUAL CARE NOTE
S[facility] present to offer support and assess for emotional/spritual care needs. Pt sleeping, appears peaceful and calm. Attempted supportive call to pt spouse jannet Tierney and encouraged call back if needed. CHP to follow up later this date and/or week.  Rep

## 2021-11-15 NOTE — TELEPHONE ENCOUNTER
Laura cornell from [facility].   Patient admitted to [facility] @ [facility] 11/14/2021

## 2021-11-15 NOTE — PROGRESS NOTES
[facility]     Hospitalist Progress Note     [place] Patient Status:  Inpatient    1945 MRN [id number]   [facility] 4NW-A Attending lalita [place] Day # 1 PCP Sangita Osorio MD     Chief Complaint: Ar Mcdanielse Pro-Calcitonin  Recent Labs   Lab 11/13/21  0436 11/14/21  0626   PCT 71.20* 48.50*       Cardiac  No results for input(s): TROP, PBNP in the last 168 hours. Creatinine Kinase  No results for input(s): CK in the last 168 hours.     Inflammatory Mar

## 2021-11-15 NOTE — HOSPICE RN NOTE
[facility] Inpatient Nursing Rounds: Pt visit conducted with  at bedside. Pt unresponsive to verbal or tactile stimuli. No objective signs or symptoms of pain or discomfort though increased work of breathing noted during visit.  RR=26, appea

## 2021-11-15 NOTE — HOSPICE RN NOTE
Pt received in bed resting more comfortably with even regular respirations. Morphine drip running at 1 mg/hr. No family present.

## 2021-11-15 NOTE — PLAN OF CARE
Pt received resting comfortably. Family at bedside throughout the day. [facility] to see pt. Recommended increasing morphine PCA to 2mg/hr to manage tachypnea. Dose titrated. Pt RR stable.  Spiritual care to attempt seeing family again tomorrow AM.

## 2021-11-16 NOTE — HOSPICE RN NOTE
[facility] Inpatient nursing rounds: Pt visit conducted, no visitors at bedside. Pt remains unresponsive. RR=12, no objective signs or symptoms of discomfort noted at this time. IV patent, POC discussed with Marivel Vargas RN.

## 2021-11-16 NOTE — H&P
[facility] HOSPITALIST  History and Physical     Francis Enter Patient Status:  Inpatient    1945 MRN [id number]   [facility] 4NW-A Attending Stefani Reyes DO   Hosp Day # 1 PCP Sangita Osorio MD     Chief Complaint: abdominal pa 11/16/2016    Procedure: COLONOSCOPY;  Surgeon:  Henry Newsome MD;  Location: [facility] ENDOSCOPY   • EYE SURGERY Left     detached retina repair   • HYSTERECTOMY  1/09    KEMI/BSO/omentectomy with pelvic and aortic lymphadenectomy   • OTHER  1/2009    staging organomegaly. Neurologic: No focal neurological deficits. CNII-XII grossly intact. Musculoskeletal: Moves all extremities. Extremities: No edema or cyanosis. Integument: No rashes or lesions. Psychiatric: Appropriate mood and affect.       Diagnostic candidiasis      Quality:  · DVT Prophylaxis: NA/hospice  · CODE status: DNAR/comfort  · Carvajal: no  · If COVID testing is negative, may discontinue isolation: yes     Plan of care discussed with pt, pt's , hospice, RN    Norberto Correa, DO  11/

## 2021-11-16 NOTE — HOSPICE RN NOTE
[facility] inpatient nursing rounds. No family at bedside at this time. No change in patient condition. Continues on morphine infusion at 2mg/hr for management of pain, respiratory distress and anxiety.  Remains appropriate for inpatient level of h

## 2021-11-16 NOTE — HOSPICE RN NOTE
[facility] inpatient nursing visit. No family at bedside at this time. Patient unresponsive to verbal/tactile stimulus. Appears actively dying. Continues on morphine infusion at 2mg/hr.  Last VS /54, HR 92, RR 14, Temp 98 oral. Patient remain

## 2021-11-16 NOTE — PLAN OF CARE
Pt received resting w/ family at bedside. Seen by residential hospice today. Pt's family reported this evening that she appeared uncomfortable. 1mg IVP morphine bolus administered per orders. PCA titrated up to 3mg/hr. Fall precautions in place.  Will mauro

## 2021-11-16 NOTE — PROGRESS NOTES
[facility]     Hospitalist Progress Note     Mack Romero Patient Status:  Inpatient    1945 MRN [id number]   [facility] 4NW-A Attending [facility] Day # 2 PCP Huong Mixon MD     Chief Complaint: Javancelena Browns Pro-Calcitonin  Recent Labs   Lab 11/13/21  0436 11/14/21  0626   PCT 71.20* 48.50*       Cardiac  No results for input(s): TROP, PBNP in the last 168 hours. Creatinine Kinase  No results for input(s): CK in the last 168 hours.     Inflammatory Mar

## 2021-11-16 NOTE — PROGRESS NOTES
11/16/21 1702   Clinical Encounter Type   Visited With Family  ( responded to a consult for spiritual care.  Pt. was not awake, and 3 family members present said they were not in need of services.)   Continue Visiting No   Chaplains are available

## 2021-11-17 NOTE — HOSPICE RN NOTE
Pt unresponsive, no s/s of pain. RR 12, clear with brief, irregular apnea and some laboring. MS drip infusing at 3mg/h. Plan to increase drip to ease breathing was reviewed with  via phone. He agrees with plan.   Pt. remains GIP appropriate for c

## 2021-11-17 NOTE — PLAN OF CARE
Patient received, residential hospice inpatient, asleep/ unresponsive, no signs of distress, breathing easy. Patient on 2L nasal cannula. Patient is on a morphine continuous infusion at 3mg/hr for management of pain. A new bag was replaced.  Had slight feve

## 2021-11-17 NOTE — PROGRESS NOTES
Morphine gtt increased  to 4 mg/hr, at bedside,Patient is non verbal,unresponsive. Eyes closed most of the time, Repositioned and made comfortable,Tylenol suppository was give for elevated temp. Will monitor.

## 2021-11-17 NOTE — PROGRESS NOTES
[facility]     Hospitalist Progress Note     Maira Rojas Patient Status:  Inpatient    1945 MRN [id number]   [facility] 4NW-A Attending [address] [address] [address] Day # 3 PCP Gracie Kennedy MD     Chief Complaint: Siva Dewitt Cardiac  No results for input(s): TROP, PBNP in the last 168 hours. Creatinine Kinase  No results for input(s): CK in the last 168 hours. Inflammatory Markers  No results for input(s): CRP, FELECIA, LDH, DDIMER in the last 168 hours.     Imaging: Im

## 2021-11-17 NOTE — HOSPICE RN NOTE
[facility]idential [facility] inpatient nursing rounds. Son at bedside at this time. Questions and concerns addressed. No needs expressed. Patient unresponsive and actively dying. Last VS BP 96/46, HR 94, RR 18, Temp 100.2A. no objective signs of distress noted.  Israel Sales

## 2021-11-18 NOTE — PLAN OF CARE
Called into pt's room per noc PATRICIA Zaman. Pt DNAR/hospice. Pt pronounced dead at [address] [place] [address] 11/17/2021. No palpable pulses, no heart beat, no blood pressure, no respirations, no muscle movement.

## 2021-11-18 NOTE — DISCHARGE SUMMARY
[facility] HOSPITALIST  DISCHARGE SUMMARY     Fatuma Jackson Patient Status:  Inpatient    1945 MRN [id number]   [facility] 4NW-A Attending No att. providers found   Hosp Day # 3 PCP Nabor Renner MD     Date of Admission: 2021  Date

## 2021-12-23 NOTE — DISCHARGE SUMMARY
[facility] HOSPITALIST  DISCHARGE SUMMARY     Annabambiadrren Nitesh Patient Status:  Inpatient    1945 MRN [id number]   [facility] 4NW-A Attending No att. providers found   Hosp Day # 2 PCP Clelia Soulier, MD     Date of Admission: 2021  Date

## 2022-08-01 ENCOUNTER — MED REC SCAN ONLY (OUTPATIENT)
Dept: FAMILY MEDICINE CLINIC | Facility: CLINIC | Age: 77
End: 2022-08-01

## 2024-01-10 NOTE — PLAN OF CARE
Pt received at 0730 resting in bed. A&Ox4. No c/o pain at this time. Sinus on the monitor. Heparin gtt infusing at 13ml/hr. Plan for US biopsy liver today. Oriented to room and call light. Will continue to monitor.     1230- Spoke with radiology, Heparin gt none

## (undated) NOTE — LETTER
[address] [facility] FOR BLOOD OR BLOOD COMPONENTS      In the course of your treatment, it may become necessary to administer a transfusion of blood or blood components.  This form provides basic information concerning this proc alternatives to you if it has not already been done. I,Ariadna Bush, have read/had read to me the above. I understand the matters bearing on the decision whether or not to authorize a transfusion of blood or blood components.  I have no questions which h

## (undated) NOTE — IP AVS SNAPSHOT
Patient Demographics     Address  [address] [place] [address]  Ivette Buchanan [address] Phone  [phone] (Home) *Preferred* E-mail Address  [email or web address]      Emergency Contact(s)     Name Relation Home Work Mobile    Sinan Bush Spouse [phone] 375 tablet (8 mg total) by mouth every 8 (eight) hours as needed for Nausea. Wendel Brunner, MD         prochlorperazine 10 mg tablet  Commonly known as: COMPAZINE      Take 1 tablet (10 mg total) by mouth every 6 (six) hours as needed for Nausea.    Audra Auguste 96 % Filed at 11/05/2021 1524      Patient's Most Recent Weight       Most Recent Value   Patient Weight 73.9 kg (163 lb)         Lab Results Last 24 Hours      PATH COMMENT PERITONEAL FLUID [[id number]]  Resulted: 11/05/21 1547, Result status: Final result Anaerobic [[id number]]     Order Status: Sent Lab Status: No result     Specimen:  Body fluid, unspecified from Abdominal fluid     Rapid SARS-CoV-2 by PCR [[id number]]  (Normal) Collected: 10/30/21 2214    Order Status: Completed Lab Status: Final result Upd • Deep vein thrombosis ([facility] [address] 06/2020    R leg   • Disorder of liver     cancer in liver   • Dyslipidemia      diet therapy   • Dysplasia of cervix (uteri)    • Endometrial cancer (HCC) 1/09     Grade 1 Stage 1B   • IBS (irritable bowel syndrome)    • Imp Christopher Walker MD  acetaminophen (TYLENOL) tab 650 mg, 650 mg, Oral, Q6H PRN, Christopher Walker MD    gabapentin 300 MG Oral Cap, Take 2 tabs in am, one tab at noon,  2 tabs in pm, Disp: 150 capsule, Rfl: 2  Sennosides (SENOKOT OR), Take by mouth daily. intact. Musculoskeletal: Moves all extremities. Extremities: Bilateral LE edema  Integument: No rashes or lesions. Psychiatric: Appropriate mood and affect.       Diagnostic Data:      Labs:  Recent Labs   Lab 10/29/21  1346 10/30/21  2123   WBC  --  20 malignancy/chemo    #h/o endometrial/ovarian cancer    #Leukocytosis, received IV Zosyn in ED, procal pending, hold further abx for now, CXR, UA reviewed    Quality:  · DVT Prophylaxis: xarelto  · CODE status: Full  · Carvajal: no  · If COVID testing is negat Consult requested for same.     History:  Past Medical History:   Diagnosis Date   • Allergic rhinitis    • BRCA1 negative 2016   • BRCA2 negative 2016   • Calculus of kidney    • Cancer ([facility] [address]     Pancreas - chemo 2020   • Cholelithiasis     5/09 CT shows 3 reports that she does not use drugs.     Allergies:    Penicillins             RASH  Flonase [Fluticason*    RASH    Comment:Red face, looked like a burn    Medications:  • gabapentin  300 mg Oral TID   • rivaroxaban  10 mg Oral Daily with food   • Senna  8 Leukocytosis: Concerning for infection. Cultures pending. Has not received growth factors recently. Underwent paracentesis recently. On empiric meropenem. # Hypercalcemia: Possibly due to dehydration, resolved with hydration.     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   Pt had paracentesis yesterday with removal of 1.8 liters of hospital.    Procedures during hospitalization: none    Consultants: hem/onc         Discharge Medications      START taking these medications      Instructions Prescription details   ciprofloxacin 500 MG Tabs  Commonly known as: CIPRO      Take 1 tablet ( prescriptions at the location directed by your doctor or nurse    Bring a paper prescription for each of these medications  · ciprofloxacin 500 MG Tabs  · gabapentin 300 MG Caps         [place]    Follow-up appointment:   Randall Key MD  66 from home secondary to weakness. Pt with past medical history to include pancreatic cancer, leukopenia, EN, splenic infarct, and h/o PE/DVT and endometrial/ovarian cancer.     Problem List  Principal Problem:    Weakness  Active Problems:    Lymphedema of improved response time and ability to follow commands     Patient’s self-stated goal is not stated     OBJECTIVE  Precautions: Bed/chair alarm    WEIGHT BEARING RESTRICTION  Weight Bearing Restriction: None                PAIN ASSESSMENT   Ratin  Locat Prabha Lacks pt into kohler. Pt gait trained in [place] c RW min A, chair follow provided. Pt requires 1 standing rest break. Pt denies dizziness, SOB and pain,  Stand>sit CGA. Cues for sequencing. Pt performed seated therex for BLE. Seated HEP provided.   Pt Physical Therapy Note signed by Ariella Matthews PTA at 11/3/2021  1:03 PM  Version 1 of 1    Author: Ariella Matthews PTA Service: Rehab Author Type: Physical Therapy Assistant    Filed: 11/3/2021  1:03 PM Date of Service: 11/3/2021 12:58 venous reflux 2016       Past Surgical History  Past Surgical History:   Procedure Laterality Date   •       x 2   • CATARACT Bilateral     IOL's   • COLONOSCOPY  2011`    polyp/repeat 3 years   • COLONOSCOPY N/A 2016    Procedure: COLON Approx Degree of Impairment: 68.66%   Standardized Score (AM-PAC Scale): 35.33   CMS Modifier (G-Code): CL    FUNCTIONAL ABILITY STATUS  Gait Assessment   Gait Assistance:  Moderate assistance  Distance (ft): 2  Assistive Device: Rolling walker  Pattern: mobility; Body mechanics; Endurance; Energy conservation;Patient education; Family education;Strengthening;Stair training;Transfer training;Balance training  Rehab Potential : Good  Frequency (Obs): 3-5x/week    CURRENT GOALS   Goal #1 Patient is able to demon Pancreas - chemo 2020   • Cholelithiasis     5/09 CT shows 3x2.7cm stone (asymptomatic)   • Deep vein thrombosis ([address] 06/2020    R leg   • Disorder of liver     cancer in liver   • Dyslipidemia      diet therapy   • Dysplasia of cervix (uteri)    • Endome Bathing (including washing, rinsing, drying)?: A Lot  -   Toileting, which includes using toilet, bedpan or urinal? : A Lot  -   Putting on and taking off regular upper body clothing?: A Little  -   Taking care of personal grooming such as brushing teeth?: toward OT goals. Pt presents with deficits in strength, balance, cognition, functional reach, endurance. Pt with improvement in grooming and UE HEP this session. Recommend ongoing skilled OT to maximize safety and independence with ADL/transfers.  Continues Problems     Active Goals        Problem: Patient/Family Goals    Goal Priority Disciplines Outcome Interventions   Patient/Family Long Term Goal     Interdisciplinary     Description: Patient's Long Term Goal: ***    Interventions:  - ***  - See additiona

## (undated) NOTE — IP AVS SNAPSHOT
[address]            (For Outpatient Use Only) Initial Admit Date: 10/30/2021   Inpt/Obs Admit Date: Inpt: 10/31/21 / Obs: N/A   Discharge Date:    Yanira Gonzalez:  [de-identified]   MRN: [de-identified]   CSN: [id number]   CEID: [id number] Number:  Insurance Type:    Subscriber Name:  Subscriber :    Subscriber ID:  Pt Rel to Subscriber:    Hospital Account Financial Class: Medicare    2021

## (undated) NOTE — LETTER
Printed: 2021    Patient Name: Lea Ball  : 1945   Medical Record #: [id number]    Consent to Cancer Treatment    I, Lea Ball, understand that I have been diagnosed with pancreatic cancer    I understand that the treatment suggest I have had the chance to ask questions about this treatment, and my questions have been answered to my satisfaction.   I understand that I can contact my oncologist, Keila Rodriguez MD, or my Cancer Care Team at any time if I have questions, by calling [phone]

## (undated) NOTE — LETTER
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     I understand th I have had the chance to ask questions about this treatment, and my questions have been answered to my satisfaction. I understand that I can contact my oncologist, Dr Zeus Neely or my Cancer Care Team at any time if I have questions, by calling [phone].

## (undated) NOTE — LETTER
[facility] [address], [address]  Consent for Procedure/Sedation    Date:     Time:       1. I authorize the performance upon Mack Romero the following:  VENOUS ACCESS PORT IMPLANT     2.  I authorize  ___________________ (a ________________________________    ___________________    Witness: _________________________      Date: ___________________    Printed: 2020   2:01 PM  Patient Name: Rachele Santos        : 1945       Medical Record #: [id number]

## (undated) NOTE — LETTER
[address] [facility] [address], [address] [facility]  Authorization for Surgical Operation and Procedure   Date:___________                                                                                            Time:__________  1.  I hereby aut hemolytic reactions, transmission of diseases such as Hepatitis, AIDS and Cytomegalovirus (CMV) and fluid overload. In the event that I wish to have an autologous transfusion of my own blood, or a directed donor transfusion.   I will discuss this with my p purposes of reinstating the DNAR order. 10. Patients having a sterilization procedure: I understand that if the procedure is successful the results will be permanent and it will therefore be impossible for me to inseminate, conceive, or bear children.   I

## (undated) NOTE — LETTER
Printed: 3/3/2021    Patient Name: Mukesh Harris  : 1945   Medical Record #: [id number]    Consent to Cancer Treatment    I, Mukesh Harris, understand that I have been diagnosed with pancreatic cancer.     I understand that the treatment sugges I have read and fully understand this consent to cancer treatment. I acknowledge that the explanations above were made. The physicians have answered all my questions and I consent to the use of Medication as noted above.         Date:__________  Time:______